# Patient Record
Sex: MALE | Race: BLACK OR AFRICAN AMERICAN | HISPANIC OR LATINO | Employment: FULL TIME | URBAN - METROPOLITAN AREA
[De-identification: names, ages, dates, MRNs, and addresses within clinical notes are randomized per-mention and may not be internally consistent; named-entity substitution may affect disease eponyms.]

---

## 2019-04-03 ENCOUNTER — TELEPHONE (OUTPATIENT)
Dept: OTOLARYNGOLOGY | Facility: CLINIC | Age: 44
End: 2019-04-03

## 2019-04-03 NOTE — TELEPHONE ENCOUNTER
Spoke with patients wife she is wanting to schedule a sooner appointment for ear pain. Offered soonest appt on 4/9 at 1:00 with audiologist and 1:40 with Dr Overton. Holley agreed to schedule for this date and time

## 2019-04-03 NOTE — TELEPHONE ENCOUNTER
----- Message from Mar Edwards sent at 4/3/2019  1:35 PM CDT -----  Contact: alejandra Babb [phone]  Type:  Sooner Apoointment Request    Caller is requesting a sooner appointment.  Caller declined first available appointment listed below.  Caller will not accept being placed on the waitlist and is requesting a message be sent to doctor.  Name of Caller: alejandra Babb  When is the first available appointment? 4/29/19  Symptoms: right ear pain and pressure  Would the patient rather a call back or a response via [facility]? Call back  Best Call Back Number: [phone] (work)  Additional Information: patient will be self-pay. Requesting an appt for 4/12/19

## 2019-04-09 ENCOUNTER — CLINICAL SUPPORT (OUTPATIENT)
Dept: OTOLARYNGOLOGY | Facility: CLINIC | Age: 44
End: 2019-04-09

## 2019-04-09 ENCOUNTER — OFFICE VISIT (OUTPATIENT)
Dept: OTOLARYNGOLOGY | Facility: CLINIC | Age: 44
End: 2019-04-09

## 2019-04-09 VITALS
SYSTOLIC BLOOD PRESSURE: 130 MMHG | TEMPERATURE: 97 F | HEART RATE: 61 BPM | DIASTOLIC BLOOD PRESSURE: 82 MMHG | WEIGHT: 189.94 LBS

## 2019-04-09 DIAGNOSIS — H60.311 CHRONIC DIFFUSE OTITIS EXTERNA OF RIGHT EAR: Primary | ICD-10-CM

## 2019-04-09 DIAGNOSIS — H92.11 OTORRHEA OF RIGHT EAR: ICD-10-CM

## 2019-04-09 DIAGNOSIS — J30.89 NON-SEASONAL ALLERGIC RHINITIS, UNSPECIFIED TRIGGER: ICD-10-CM

## 2019-04-09 PROCEDURE — 92552 PURE TONE AUDIOMETRY AIR: CPT | Mod: PBBFAC,PN | Performed by: AUDIOLOGIST-HEARING AID FITTER

## 2019-04-09 PROCEDURE — 92567 TYMPANOMETRY: CPT | Mod: PBBFAC,PN | Performed by: AUDIOLOGIST-HEARING AID FITTER

## 2019-04-09 PROCEDURE — 99999 PR PBB SHADOW E&M-EST. PATIENT-LVL III: CPT | Mod: PBBFAC,,, | Performed by: OTOLARYNGOLOGY

## 2019-04-09 PROCEDURE — 99211 OFF/OP EST MAY X REQ PHY/QHP: CPT | Mod: PBBFAC,27,PN | Performed by: AUDIOLOGIST-HEARING AID FITTER

## 2019-04-09 PROCEDURE — 99203 OFFICE O/P NEW LOW 30 MIN: CPT | Mod: S$PBB,,, | Performed by: OTOLARYNGOLOGY

## 2019-04-09 PROCEDURE — 92556 SPEECH AUDIOMETRY COMPLETE: CPT | Mod: PBBFAC,PN | Performed by: AUDIOLOGIST-HEARING AID FITTER

## 2019-04-09 PROCEDURE — 99999 PR PBB SHADOW E&M-EST. PATIENT-LVL III: ICD-10-PCS | Mod: PBBFAC,,, | Performed by: OTOLARYNGOLOGY

## 2019-04-09 PROCEDURE — 99203 PR OFFICE/OUTPT VISIT, NEW, LEVL III, 30-44 MIN: ICD-10-PCS | Mod: S$PBB,,, | Performed by: OTOLARYNGOLOGY

## 2019-04-09 PROCEDURE — 99999 PR PBB SHADOW E&M-EST. PATIENT-LVL I: CPT | Mod: PBBFAC,,, | Performed by: AUDIOLOGIST-HEARING AID FITTER

## 2019-04-09 PROCEDURE — 99213 OFFICE O/P EST LOW 20 MIN: CPT | Mod: PBBFAC,PN | Performed by: OTOLARYNGOLOGY

## 2019-04-09 PROCEDURE — 99999 PR PBB SHADOW E&M-EST. PATIENT-LVL I: ICD-10-PCS | Mod: PBBFAC,,, | Performed by: AUDIOLOGIST-HEARING AID FITTER

## 2019-04-09 RX ORDER — FLUTICASONE PROPIONATE 50 MCG
2 SPRAY, SUSPENSION (ML) NASAL DAILY
Qty: 15.8 ML | Refills: 12 | Status: SHIPPED | OUTPATIENT
Start: 2019-04-09 | End: 2023-09-29

## 2019-04-09 RX ORDER — CIPROFLOXACIN HYDROCHLORIDE 3 MG/ML
SOLUTION/ DROPS OPHTHALMIC
Qty: 10 ML | Refills: 0 | Status: SHIPPED | OUTPATIENT
Start: 2019-04-09 | End: 2023-09-29

## 2019-04-09 RX ORDER — PREDNISOLONE ACETATE 10 MG/ML
SUSPENSION/ DROPS OPHTHALMIC
Qty: 10 ML | Refills: 0 | Status: SHIPPED | OUTPATIENT
Start: 2019-04-09 | End: 2023-09-29

## 2019-04-09 NOTE — PROGRESS NOTES
Chief Complaint   Patient presents with    Other     after using ear plugs noticed draining, also itching   .     HPI:    Jone is a 43 y.o. male who presents for evaluation of right ear pain. The pain has been present for 4 month(s). He notes that the he has had a discharge from the right ear after using ear plugs.  The pain is described as mild.  It is associated with wearing ear plugs. The ear is not tender to touch. The post auricular area is not inflammed.  He has been treated treated with acetic acid drops and cortisoporin ear drops without relief. The drainage is not  unchanged with this course of action.      No past medical history on file.  Social History     Socioeconomic History    Marital status:      Spouse name: Not on file    Number of children: Not on file    Years of education: Not on file    Highest education level: Not on file   Occupational History    Not on file   Social Needs    Financial resource strain: Not on file    Food insecurity:     Worry: Not on file     Inability: Not on file    Transportation needs:     Medical: Not on file     Non-medical: Not on file   Tobacco Use    Smoking status: Not on file   Substance and Sexual Activity    Alcohol use: Not on file    Drug use: Not on file    Sexual activity: Not on file   Lifestyle    Physical activity:     Days per week: Not on file     Minutes per session: Not on file    Stress: Not on file   Relationships    Social connections:     Talks on phone: Not on file     Gets together: Not on file     Attends Sabianism service: Not on file     Active member of club or organization: Not on file     Attends meetings of clubs or organizations: Not on file     Relationship status: Not on file   Other Topics Concern    Not on file   Social History Narrative    Not on file     No past surgical history on file.  No family history on file.        Review of Systems  General: negative for chills, fever or weight  loss  Psychological: negative for mood changes or depression  Ophthalmic: negative for blurry vision, photophobia or eye pain  ENT: see HPI  Respiratory: no cough, shortness of breath, or wheezing  Cardiovascular: no chest pain or dyspnea on exertion  Gastrointestinal: no abdominal pain, change in bowel habits, or black/ bloody stools  Musculoskeletal: negative for gait disturbance or muscular weakness  Neurological: no syncope or seizures; no ataxia  Dermatological: negative for puritis,  rash and jaundice  Hematologic/lymphatic: no easy bruising, no new lumps or bumps      Physical Exam:    Vitals:    04/09/19 1348   BP: 130/82   Pulse: 61   Temp: 97.2 °F (36.2 °C)       Constitutional: Well appearing / communicating without difficutly.  NAD.  Eyes: EOM I Bilaterally  Head/Face: Normocephalic.  Negative paranasal sinus pressure/tenderness.  Salivary glands WNL.  House Brackmann I Bilaterally.    Right Ear: Auricle normal appearance. External Auditory Canal within normal limits no lesions or masses,TM w/o masses/lesions/perforations. TM mobility noted.   Left Ear: Auricle normal appearance. External Auditory Canal within normal limits no lesions or masses,TM w/o masses/lesions/perforations. TM mobility noted.  Nose: No gross nasal septal deviation. Inferior Turbinates 3+ bilaterally. No septal perforation. No masses/lesions. External nasal skin appears normal without masses/lesions.  Oral Cavity: Gingiva/lips within normal limits.  Dentition/gingiva healthy appearing. Mucus membranes moist. Floor of mouth soft, no masses palpated. Oral Tongue mobile. Hard Palate appears normal.    Oropharynx: Base of tongue appears normal. No masses/lesions noted. Tonsillar fossa/pharyngeal wall without lesions. Posterior oropharynx WNL.  Soft palate without masses. Midline uvula.   Neck/Lymphatic: No LAD I-VI bilaterally.  No thyromegaly.  No masses noted on exam.    Mirror laryngoscopy/nasopharyngoscopy: Active gag reflex.   Unable to perform.    Neuro/Psychiatric: AOx3.  Normal mood and affect.   Cardiovascular: Normal carotid pulses bilaterally, no increasing jugular venous distention noted at cervical region bilaterally.    Respiratory: Normal respiratory effort, no stridor, no retractions noted.                  Assessment:    ICD-10-CM ICD-9-CM    1. Chronic diffuse otitis externa of right ear H60.311 380.23    2. Otorrhea of right ear H92.11 388.60    3. Non-seasonal allergic rhinitis, unspecified trigger J30.89 477.8      The primary encounter diagnosis was Chronic diffuse otitis externa of right ear. Diagnoses of Otorrhea of right ear and Non-seasonal allergic rhinitis, unspecified trigger were also pertinent to this visit.      Plan:  No orders of the defined types were placed in this encounter.      The patient has otitis externa of the the right ear today.  I would recommend that we start the patient on cipro and pred forte ear drops.  An oral antibiotic  will not be prescribed because the patient does not have risk factors requiring systemic therapy. The patient has been instructed to keep the affected ear dry. Return to clinic in two weeks, sooner if needed.     Carlotta Cannon MD

## 2019-04-10 NOTE — PROGRESS NOTES
Keiko Norman, CCC-A  [facility] [address]  [address]  [place] [address]      Patient: Jone Guerrero   MRN: [id number]   : 1975  RAO: 04/10/2019    AUDIOLOGICAL EVALUATION    CASE HISTORY:    Jone Guerrero, 43 y.o., was seen on the above date for an audiological evaluation. Patient reported that he has been treated twice with abx by PCPs with no improvement of his symptoms. No further history significant to hearing loss was reported.    TEST RESULTS:    Otoscopy was unremarkable for both ears.   Imittance testing of both ears revealed normal middle ear compliance (Type A).  Speech reception thresholds were obtained at 10 dB HL and 5 dB HL, in the right and left ears, respectively, which was consistent with pure tone results.   Word recognitions scores of 100% were obtained in both ears using a presentation level of 50 dB HL in the right ear and 45 dB HL in the left ear.    IMPRESSION:   Audiological testing indicated that Jone Guerrero has normal hearing in both ears.    RECOMMENDATIONS:   There are no audiological recommendations at this time.    If you should have any questions or concerns regarding the above information, please do not hesitate to contact me at [phone].      _______________________________  Jarad Norman, CCC-A  Clinical Audiologist    enclosure: audiogram

## 2019-04-23 ENCOUNTER — OFFICE VISIT (OUTPATIENT)
Dept: OTOLARYNGOLOGY | Facility: CLINIC | Age: 44
End: 2019-04-23

## 2019-04-23 ENCOUNTER — TELEPHONE (OUTPATIENT)
Dept: OTOLARYNGOLOGY | Facility: CLINIC | Age: 44
End: 2019-04-23

## 2019-04-23 VITALS
HEART RATE: 63 BPM | TEMPERATURE: 97 F | DIASTOLIC BLOOD PRESSURE: 82 MMHG | SYSTOLIC BLOOD PRESSURE: 128 MMHG | HEIGHT: 70 IN | WEIGHT: 188.06 LBS | BODY MASS INDEX: 26.92 KG/M2

## 2019-04-23 DIAGNOSIS — H60.311 CHRONIC DIFFUSE OTITIS EXTERNA OF RIGHT EAR: Primary | ICD-10-CM

## 2019-04-23 DIAGNOSIS — J30.89 NON-SEASONAL ALLERGIC RHINITIS, UNSPECIFIED TRIGGER: ICD-10-CM

## 2019-04-23 PROCEDURE — 99213 OFFICE O/P EST LOW 20 MIN: CPT | Mod: S$PBB,,, | Performed by: OTOLARYNGOLOGY

## 2019-04-23 PROCEDURE — 99999 PR PBB SHADOW E&M-EST. PATIENT-LVL III: ICD-10-PCS | Mod: PBBFAC,,, | Performed by: OTOLARYNGOLOGY

## 2019-04-23 PROCEDURE — 99213 PR OFFICE/OUTPT VISIT, EST, LEVL III, 20-29 MIN: ICD-10-PCS | Mod: S$PBB,,, | Performed by: OTOLARYNGOLOGY

## 2019-04-23 PROCEDURE — 99213 OFFICE O/P EST LOW 20 MIN: CPT | Mod: PBBFAC,PN | Performed by: OTOLARYNGOLOGY

## 2019-04-23 PROCEDURE — 99999 PR PBB SHADOW E&M-EST. PATIENT-LVL III: CPT | Mod: PBBFAC,,, | Performed by: OTOLARYNGOLOGY

## 2019-04-23 RX ORDER — FLUOCINOLONE ACETONIDE 0.11 MG/ML
3 OIL AURICULAR (OTIC) 2 TIMES DAILY
Qty: 1 BOTTLE | Refills: 3 | Status: SHIPPED | OUTPATIENT
Start: 2019-04-23 | End: 2019-04-23

## 2019-04-23 NOTE — TELEPHONE ENCOUNTER
Spoke with Arabella at [facility] pharmacy she was notified as per Dr Overton to cancel the prescription for the dermotic oil ear drops. Arabella verbalized understanding and stated I will cancel this prescription

## 2019-04-23 NOTE — PROGRESS NOTES
Chief Complaint   Patient presents with    Follow-up     patient reports no pain or drainage in right ear, c/o of itching    .     HPI:    Jone is a 43 y.o. male who presents for evaluation of right ear pain. The pain has been present for 4 month(s). He notes that the he has had a discharge from the right ear after using ear plugs.  The pain is described as mild.  It is associated with wearing ear plugs. The ear is not tender to touch. The post auricular area is not inflammed.  He has been treated treated with acetic acid drops and cortisoporin ear drops without relief. The drainage is not  unchanged with this course of action.    Interval HPI 4/23/2019:   Mr. Guerrero follows up today for chronic otitis externa of the right ear.  He reports that hs is no longer having discharge or otalgia from the right ear. He does note continued pruritis of the right ear. He has taken used Cipro Otic and Pred forte drops as prescribed. He feels that this has provided relief.       History reviewed. No pertinent past medical history.  Social History     Socioeconomic History    Marital status:      Spouse name: Not on file    Number of children: Not on file    Years of education: Not on file    Highest education level: Not on file   Occupational History    Not on file   Social Needs    Financial resource strain: Not on file    Food insecurity:     Worry: Not on file     Inability: Not on file    Transportation needs:     Medical: Not on file     Non-medical: Not on file   Tobacco Use    Smoking status: Never Smoker    Smokeless tobacco: Never Used   Substance and Sexual Activity    Alcohol use: Not on file    Drug use: Not on file    Sexual activity: Not on file   Lifestyle    Physical activity:     Days per week: Not on file     Minutes per session: Not on file    Stress: Not on file   Relationships    Social connections:     Talks on phone: Not on file     Gets together: Not on file     Attends  Islam service: Not on file     Active member of club or organization: Not on file     Attends meetings of clubs or organizations: Not on file     Relationship status: Not on file   Other Topics Concern    Not on file   Social History Narrative    Not on file     History reviewed. No pertinent surgical history.  History reviewed. No pertinent family history.        Review of Systems  General: negative for chills, fever or weight loss  Psychological: negative for mood changes or depression  Ophthalmic: negative for blurry vision, photophobia or eye pain  ENT: see HPI  Respiratory: no cough, shortness of breath, or wheezing  Cardiovascular: no chest pain or dyspnea on exertion  Gastrointestinal: no abdominal pain, change in bowel habits, or black/ bloody stools  Musculoskeletal: negative for gait disturbance or muscular weakness  Neurological: no syncope or seizures; no ataxia  Dermatological: negative for puritis,  rash and jaundice  Hematologic/lymphatic: no easy bruising, no new lumps or bumps      Physical Exam:    Vitals:    04/23/19 0903   BP: 128/82   Pulse: 63   Temp: 96.6 °F (35.9 °C)       Constitutional: Well appearing / communicating without difficutly.  NAD.  Eyes: EOM I Bilaterally  Head/Face: Normocephalic.  Negative paranasal sinus pressure/tenderness.  Salivary glands WNL.  House Brackmann I Bilaterally.    Right Ear: Auricle normal appearance. External Auditory Canal within normal limits no lesions or masses,TM w/o masses/lesions/perforations. TM mobility noted.   Left Ear: Auricle normal appearance. External Auditory Canal within normal limits no lesions or masses,TM w/o masses/lesions/perforations. TM mobility noted.  Nose: No gross nasal septal deviation. Inferior Turbinates 3+ bilaterally. No septal perforation. No masses/lesions. External nasal skin appears normal without masses/lesions.  Oral Cavity: Gingiva/lips within normal limits.  Dentition/gingiva healthy appearing. Mucus membranes  moist. Floor of mouth soft, no masses palpated. Oral Tongue mobile. Hard Palate appears normal.    Oropharynx: Base of tongue appears normal. No masses/lesions noted. Tonsillar fossa/pharyngeal wall without lesions. Posterior oropharynx WNL.  Soft palate without masses. Midline uvula.   Neck/Lymphatic: No LAD I-VI bilaterally.  No thyromegaly.  No masses noted on exam.    Mirror laryngoscopy/nasopharyngoscopy: Active gag reflex.  Unable to perform.    Neuro/Psychiatric: AOx3.  Normal mood and affect.   Cardiovascular: Normal carotid pulses bilaterally, no increasing jugular venous distention noted at cervical region bilaterally.    Respiratory: Normal respiratory effort, no stridor, no retractions noted.        Assessment:    ICD-10-CM ICD-9-CM    1. Chronic diffuse otitis externa of right ear H60.311 380.23    2. Non-seasonal allergic rhinitis, unspecified trigger J30.89 477.8      The primary encounter diagnosis was Chronic diffuse otitis externa of right ear. A diagnosis of Non-seasonal allergic rhinitis, unspecified trigger was also pertinent to this visit.      Plan:  No orders of the defined types were placed in this encounter.    Infection overall improved.  The patient has been instructed to keep the affected ear dry. Return to clinic in two weeks, sooner if needed.     Continue Flonase.     Carlotta Cannon MD

## 2021-03-30 ENCOUNTER — IMMUNIZATION (OUTPATIENT)
Dept: PRIMARY CARE CLINIC | Facility: CLINIC | Age: 46
End: 2021-03-30

## 2021-03-30 DIAGNOSIS — Z23 NEED FOR VACCINATION: Primary | ICD-10-CM

## 2021-03-30 PROCEDURE — 91301 PR SARS-COV-2 COVID-19 VACCINE, NO PRSV, 100MCG/0.5ML, IM: CPT | Mod: S$GLB,,, | Performed by: INTERNAL MEDICINE

## 2021-03-30 PROCEDURE — 0011A PR IMMUNIZ ADMIN, SARS-COV-2 COVID-19 VACC, 100MCG/0.5ML, 1ST DOSE: CPT | Mod: CV19,S$GLB,, | Performed by: INTERNAL MEDICINE

## 2021-03-30 PROCEDURE — 0011A PR IMMUNIZ ADMIN, SARS-COV-2 COVID-19 VACC, 100MCG/0.5ML, 1ST DOSE: ICD-10-PCS | Mod: CV19,S$GLB,, | Performed by: INTERNAL MEDICINE

## 2021-03-30 PROCEDURE — 91301 PR SARS-COV-2 COVID-19 VACCINE, NO PRSV, 100MCG/0.5ML, IM: ICD-10-PCS | Mod: S$GLB,,, | Performed by: INTERNAL MEDICINE

## 2021-03-30 RX ADMIN — Medication 0.5 ML: at 09:03

## 2021-04-06 ENCOUNTER — OFFICE VISIT (OUTPATIENT)
Dept: URGENT CARE | Facility: CLINIC | Age: 46
End: 2021-04-06

## 2021-04-06 VITALS
WEIGHT: 188 LBS | RESPIRATION RATE: 16 BRPM | HEIGHT: 70 IN | OXYGEN SATURATION: 96 % | HEART RATE: 73 BPM | SYSTOLIC BLOOD PRESSURE: 136 MMHG | BODY MASS INDEX: 26.92 KG/M2 | DIASTOLIC BLOOD PRESSURE: 94 MMHG | TEMPERATURE: 99 F

## 2021-04-06 DIAGNOSIS — Z11.52 ENCOUNTER FOR SCREENING LABORATORY TESTING FOR COVID-19 VIRUS: Primary | ICD-10-CM

## 2021-04-06 LAB
CTP QC/QA: YES
SARS-COV-2 RDRP RESP QL NAA+PROBE: NEGATIVE

## 2021-04-06 PROCEDURE — 99214 OFFICE O/P EST MOD 30 MIN: CPT | Mod: TIER,S$GLB,, | Performed by: NURSE PRACTITIONER

## 2021-04-06 PROCEDURE — U0002 COVID-19 LAB TEST NON-CDC: HCPCS | Mod: QW,TIER,S$GLB, | Performed by: NURSE PRACTITIONER

## 2021-04-06 PROCEDURE — U0002: ICD-10-PCS | Mod: QW,TIER,S$GLB, | Performed by: NURSE PRACTITIONER

## 2021-04-06 PROCEDURE — 99214 PR OFFICE/OUTPT VISIT, EST, LEVL IV, 30-39 MIN: ICD-10-PCS | Mod: TIER,S$GLB,, | Performed by: NURSE PRACTITIONER

## 2021-05-10 ENCOUNTER — IMMUNIZATION (OUTPATIENT)
Dept: PHARMACY | Facility: CLINIC | Age: 46
End: 2021-05-10

## 2021-05-10 DIAGNOSIS — Z23 NEED FOR VACCINATION: Primary | ICD-10-CM

## 2021-09-27 ENCOUNTER — TELEPHONE (OUTPATIENT)
Dept: FAMILY MEDICINE | Facility: CLINIC | Age: 46
End: 2021-09-27

## 2021-10-04 ENCOUNTER — LAB VISIT (OUTPATIENT)
Dept: LAB | Facility: HOSPITAL | Age: 46
End: 2021-10-04
Attending: INTERNAL MEDICINE
Payer: MEDICAID

## 2021-10-04 ENCOUNTER — OFFICE VISIT (OUTPATIENT)
Dept: INTERNAL MEDICINE | Facility: CLINIC | Age: 46
End: 2021-10-04
Payer: MEDICAID

## 2021-10-04 VITALS
HEART RATE: 68 BPM | DIASTOLIC BLOOD PRESSURE: 96 MMHG | BODY MASS INDEX: 28.32 KG/M2 | WEIGHT: 197.38 LBS | SYSTOLIC BLOOD PRESSURE: 138 MMHG

## 2021-10-04 DIAGNOSIS — I73.00 RAYNAUD'S PHENOMENON WITHOUT GANGRENE: ICD-10-CM

## 2021-10-04 DIAGNOSIS — Z76.89 ENCOUNTER TO ESTABLISH CARE: ICD-10-CM

## 2021-10-04 DIAGNOSIS — Z12.5 PROSTATE CANCER SCREENING: ICD-10-CM

## 2021-10-04 DIAGNOSIS — M13.0 POLYARTHRITIS: Primary | ICD-10-CM

## 2021-10-04 DIAGNOSIS — R76.8 ANA POSITIVE: ICD-10-CM

## 2021-10-04 DIAGNOSIS — M13.0 POLYARTHRITIS: ICD-10-CM

## 2021-10-04 DIAGNOSIS — Z80.42 FAMILY HISTORY OF PROSTATE CANCER IN FATHER: ICD-10-CM

## 2021-10-04 LAB
25(OH)D3+25(OH)D2 SERPL-MCNC: 33 NG/ML (ref 30–96)
ALBUMIN SERPL BCP-MCNC: 3.5 G/DL (ref 3.5–5.2)
ALP SERPL-CCNC: 103 U/L (ref 55–135)
ALT SERPL W/O P-5'-P-CCNC: 12 U/L (ref 10–44)
ANION GAP SERPL CALC-SCNC: 8 MMOL/L (ref 8–16)
AST SERPL-CCNC: 19 U/L (ref 10–40)
BASOPHILS # BLD AUTO: 0.03 K/UL (ref 0–0.2)
BASOPHILS NFR BLD: 0.8 % (ref 0–1.9)
BILIRUB SERPL-MCNC: 0.6 MG/DL (ref 0.1–1)
BUN SERPL-MCNC: 13 MG/DL (ref 6–20)
CALCIUM SERPL-MCNC: 9.2 MG/DL (ref 8.7–10.5)
CCP AB SER IA-ACNC: 0.7 U/ML
CHLORIDE SERPL-SCNC: 106 MMOL/L (ref 95–110)
CO2 SERPL-SCNC: 23 MMOL/L (ref 23–29)
COMPLEXED PSA SERPL-MCNC: 1.4 NG/ML (ref 0–4)
CREAT SERPL-MCNC: 0.8 MG/DL (ref 0.5–1.4)
CRP SERPL-MCNC: 18.3 MG/L (ref 0–8.2)
DIFFERENTIAL METHOD: ABNORMAL
EOSINOPHIL # BLD AUTO: 0.1 K/UL (ref 0–0.5)
EOSINOPHIL NFR BLD: 2.6 % (ref 0–8)
ERYTHROCYTE [DISTWIDTH] IN BLOOD BY AUTOMATED COUNT: 14 % (ref 11.5–14.5)
ERYTHROCYTE [SEDIMENTATION RATE] IN BLOOD BY WESTERGREN METHOD: 116 MM/HR (ref 0–23)
EST. GFR  (AFRICAN AMERICAN): >60 ML/MIN/1.73 M^2
EST. GFR  (NON AFRICAN AMERICAN): >60 ML/MIN/1.73 M^2
GLUCOSE SERPL-MCNC: 82 MG/DL (ref 70–110)
HCT VFR BLD AUTO: 37.6 % (ref 40–54)
HGB BLD-MCNC: 12 G/DL (ref 14–18)
IMM GRANULOCYTES # BLD AUTO: 0.02 K/UL (ref 0–0.04)
IMM GRANULOCYTES NFR BLD AUTO: 0.5 % (ref 0–0.5)
LYMPHOCYTES # BLD AUTO: 2 K/UL (ref 1–4.8)
LYMPHOCYTES NFR BLD: 50.6 % (ref 18–48)
MCH RBC QN AUTO: 27.5 PG (ref 27–31)
MCHC RBC AUTO-ENTMCNC: 31.9 G/DL (ref 32–36)
MCV RBC AUTO: 86 FL (ref 82–98)
MONOCYTES # BLD AUTO: 0.4 K/UL (ref 0.3–1)
MONOCYTES NFR BLD: 9 % (ref 4–15)
NEUTROPHILS # BLD AUTO: 1.4 K/UL (ref 1.8–7.7)
NEUTROPHILS NFR BLD: 36.5 % (ref 38–73)
NRBC BLD-RTO: 0 /100 WBC
PLATELET # BLD AUTO: 155 K/UL (ref 150–450)
PMV BLD AUTO: 11.7 FL (ref 9.2–12.9)
POTASSIUM SERPL-SCNC: 4.1 MMOL/L (ref 3.5–5.1)
PROT SERPL-MCNC: 8.3 G/DL (ref 6–8.4)
RBC # BLD AUTO: 4.36 M/UL (ref 4.6–6.2)
RHEUMATOID FACT SERPL-ACNC: <13 IU/ML (ref 0–15)
SODIUM SERPL-SCNC: 137 MMOL/L (ref 136–145)
WBC # BLD AUTO: 3.91 K/UL (ref 3.9–12.7)

## 2021-10-04 PROCEDURE — 99999 PR PBB SHADOW E&M-EST. PATIENT-LVL II: ICD-10-PCS | Mod: PBBFAC,,, | Performed by: INTERNAL MEDICINE

## 2021-10-04 PROCEDURE — 86039 ANTINUCLEAR ANTIBODIES (ANA): CPT | Performed by: INTERNAL MEDICINE

## 2021-10-04 PROCEDURE — 80053 COMPREHEN METABOLIC PANEL: CPT | Performed by: INTERNAL MEDICINE

## 2021-10-04 PROCEDURE — 86235 NUCLEAR ANTIGEN ANTIBODY: CPT | Mod: 59 | Performed by: INTERNAL MEDICINE

## 2021-10-04 PROCEDURE — 36415 COLL VENOUS BLD VENIPUNCTURE: CPT | Performed by: INTERNAL MEDICINE

## 2021-10-04 PROCEDURE — 99499 NO LOS: ICD-10-PCS | Mod: S$PBB,,, | Performed by: INTERNAL MEDICINE

## 2021-10-04 PROCEDURE — 86431 RHEUMATOID FACTOR QUANT: CPT | Performed by: INTERNAL MEDICINE

## 2021-10-04 PROCEDURE — 84153 ASSAY OF PSA TOTAL: CPT | Performed by: INTERNAL MEDICINE

## 2021-10-04 PROCEDURE — 86200 CCP ANTIBODY: CPT | Performed by: INTERNAL MEDICINE

## 2021-10-04 PROCEDURE — 86225 DNA ANTIBODY NATIVE: CPT | Mod: 59 | Performed by: INTERNAL MEDICINE

## 2021-10-04 PROCEDURE — 85025 COMPLETE CBC W/AUTO DIFF WBC: CPT | Performed by: INTERNAL MEDICINE

## 2021-10-04 PROCEDURE — 86038 ANTINUCLEAR ANTIBODIES: CPT | Performed by: INTERNAL MEDICINE

## 2021-10-04 PROCEDURE — 85652 RBC SED RATE AUTOMATED: CPT | Performed by: INTERNAL MEDICINE

## 2021-10-04 PROCEDURE — 99999 PR PBB SHADOW E&M-EST. PATIENT-LVL II: CPT | Mod: PBBFAC,,, | Performed by: INTERNAL MEDICINE

## 2021-10-04 PROCEDURE — 86140 C-REACTIVE PROTEIN: CPT | Performed by: INTERNAL MEDICINE

## 2021-10-04 PROCEDURE — 99212 OFFICE O/P EST SF 10 MIN: CPT | Mod: PBBFAC | Performed by: INTERNAL MEDICINE

## 2021-10-04 PROCEDURE — 82306 VITAMIN D 25 HYDROXY: CPT | Performed by: INTERNAL MEDICINE

## 2021-10-04 PROCEDURE — 99499 UNLISTED E&M SERVICE: CPT | Mod: S$PBB,,, | Performed by: INTERNAL MEDICINE

## 2021-10-05 LAB
ANA PATTERN 1: NORMAL
ANA SER QL IF: POSITIVE
ANA TITR SER IF: NORMAL {TITER}

## 2021-10-07 LAB
ANTI SM ANTIBODY: 3.99 RATIO (ref 0–0.99)
ANTI SM/RNP ANTIBODY: 6.56 RATIO (ref 0–0.99)
ANTI-SM INTERPRETATION: POSITIVE
ANTI-SM/RNP INTERPRETATION: POSITIVE
ANTI-SSA ANTIBODY: 5.53 RATIO (ref 0–0.99)
ANTI-SSA INTERPRETATION: POSITIVE
ANTI-SSB ANTIBODY: 0.6 RATIO (ref 0–0.99)
ANTI-SSB INTERPRETATION: NEGATIVE
DNA TITER: NORMAL
DSDNA AB SER-ACNC: POSITIVE [IU]/ML

## 2021-10-11 ENCOUNTER — LAB VISIT (OUTPATIENT)
Dept: LAB | Facility: HOSPITAL | Age: 46
End: 2021-10-11
Attending: INTERNAL MEDICINE
Payer: MEDICAID

## 2021-10-11 ENCOUNTER — OFFICE VISIT (OUTPATIENT)
Dept: INTERNAL MEDICINE | Facility: CLINIC | Age: 46
End: 2021-10-11
Payer: MEDICAID

## 2021-10-11 DIAGNOSIS — M32.9 LUPUS: Primary | ICD-10-CM

## 2021-10-11 DIAGNOSIS — M32.9 LUPUS: ICD-10-CM

## 2021-10-11 DIAGNOSIS — Z71.89 ENCOUNTER FOR MEDICATION REVIEW AND COUNSELING: ICD-10-CM

## 2021-10-11 LAB
BILIRUB UR QL STRIP: NEGATIVE
CLARITY UR REFRACT.AUTO: CLEAR
COLOR UR AUTO: YELLOW
CREAT UR-MCNC: 216 MG/DL (ref 23–375)
GLUCOSE UR QL STRIP: NEGATIVE
HGB UR QL STRIP: ABNORMAL
KETONES UR QL STRIP: NEGATIVE
LEUKOCYTE ESTERASE UR QL STRIP: NEGATIVE
MICROSCOPIC COMMENT: NORMAL
NITRITE UR QL STRIP: NEGATIVE
PH UR STRIP: 6 [PH] (ref 5–8)
PROT UR QL STRIP: NEGATIVE
PROT UR-MCNC: 14 MG/DL (ref 0–15)
PROT/CREAT UR: 0.06 MG/G{CREAT} (ref 0–0.2)
RBC #/AREA URNS AUTO: 0 /HPF (ref 0–4)
SP GR UR STRIP: 1.03 (ref 1–1.03)
URN SPEC COLLECT METH UR: ABNORMAL

## 2021-10-11 PROCEDURE — 99499 NO LOS: ICD-10-PCS | Mod: S$PBB,,, | Performed by: INTERNAL MEDICINE

## 2021-10-11 PROCEDURE — 99499 UNLISTED E&M SERVICE: CPT | Mod: S$PBB,,, | Performed by: INTERNAL MEDICINE

## 2021-10-11 PROCEDURE — 81001 URINALYSIS AUTO W/SCOPE: CPT | Performed by: INTERNAL MEDICINE

## 2021-10-11 PROCEDURE — 99999 PR PBB SHADOW E&M-EST. PATIENT-LVL I: CPT | Mod: PBBFAC,,, | Performed by: INTERNAL MEDICINE

## 2021-10-11 PROCEDURE — 99999 PR PBB SHADOW E&M-EST. PATIENT-LVL I: ICD-10-PCS | Mod: PBBFAC,,, | Performed by: INTERNAL MEDICINE

## 2021-10-11 PROCEDURE — 82570 ASSAY OF URINE CREATININE: CPT | Performed by: INTERNAL MEDICINE

## 2021-10-11 PROCEDURE — 99211 OFF/OP EST MAY X REQ PHY/QHP: CPT | Mod: PBBFAC | Performed by: INTERNAL MEDICINE

## 2021-10-19 ENCOUNTER — OFFICE VISIT (OUTPATIENT)
Dept: RHEUMATOLOGY | Facility: CLINIC | Age: 46
End: 2021-10-19

## 2021-10-19 VITALS
SYSTOLIC BLOOD PRESSURE: 133 MMHG | WEIGHT: 195.56 LBS | BODY MASS INDEX: 28 KG/M2 | HEART RATE: 68 BPM | DIASTOLIC BLOOD PRESSURE: 83 MMHG | HEIGHT: 70 IN

## 2021-10-19 DIAGNOSIS — M32.19 OTHER SYSTEMIC LUPUS ERYTHEMATOSUS WITH OTHER ORGAN INVOLVEMENT: Primary | ICD-10-CM

## 2021-10-19 PROBLEM — M32.8 OTHER FORMS OF SYSTEMIC LUPUS ERYTHEMATOSUS: Chronic | Status: ACTIVE | Noted: 2021-10-11

## 2021-10-19 PROCEDURE — 99203 PR OFFICE/OUTPT VISIT, NEW, LEVL III, 30-44 MIN: ICD-10-PCS | Mod: S$PBB,,, | Performed by: INTERNAL MEDICINE

## 2021-10-19 PROCEDURE — 99213 OFFICE O/P EST LOW 20 MIN: CPT | Mod: PBBFAC | Performed by: INTERNAL MEDICINE

## 2021-10-19 PROCEDURE — 99999 PR PBB SHADOW E&M-EST. PATIENT-LVL III: CPT | Mod: PBBFAC,,, | Performed by: INTERNAL MEDICINE

## 2021-10-19 PROCEDURE — 99999 PR PBB SHADOW E&M-EST. PATIENT-LVL III: ICD-10-PCS | Mod: PBBFAC,,, | Performed by: INTERNAL MEDICINE

## 2021-10-19 PROCEDURE — 99203 OFFICE O/P NEW LOW 30 MIN: CPT | Mod: S$PBB,,, | Performed by: INTERNAL MEDICINE

## 2021-10-19 RX ORDER — HYDROXYCHLOROQUINE SULFATE 200 MG/1
400 TABLET, FILM COATED ORAL DAILY
Qty: 60 TABLET | Refills: 2 | Status: SHIPPED | OUTPATIENT
Start: 2021-10-19 | End: 2021-12-27 | Stop reason: SDUPTHER

## 2021-10-19 ASSESSMENT — ROUTINE ASSESSMENT OF PATIENT INDEX DATA (RAPID3)
MDHAQ FUNCTION SCORE: 0
PSYCHOLOGICAL DISTRESS SCORE: 1.1
FATIGUE SCORE: 3
AM STIFFNESS SCORE: 1, YES
TOTAL RAPID3 SCORE: 4.67
PATIENT GLOBAL ASSESSMENT SCORE: 6.5
PAIN SCORE: 7.5

## 2021-10-19 ASSESSMENT — SYSTEMIC LUPUS ERYTHEMATOSUS DISEASE ACTIVITY INDEX (SLEDAI): TOTAL_SCORE: 5

## 2021-10-22 RX ORDER — METHYLPREDNISOLONE 4 MG/1
TABLET ORAL
Qty: 21 EACH | Refills: 0 | Status: SHIPPED | OUTPATIENT
Start: 2021-10-22 | End: 2021-11-12

## 2021-12-27 DIAGNOSIS — M32.19 OTHER SYSTEMIC LUPUS ERYTHEMATOSUS WITH OTHER ORGAN INVOLVEMENT: ICD-10-CM

## 2021-12-27 RX ORDER — HYDROXYCHLOROQUINE SULFATE 200 MG/1
400 TABLET, FILM COATED ORAL DAILY
Qty: 60 TABLET | Refills: 3 | Status: SHIPPED | OUTPATIENT
Start: 2021-12-27 | End: 2022-04-01 | Stop reason: SDUPTHER

## 2022-01-12 ENCOUNTER — LAB VISIT (OUTPATIENT)
Dept: PRIMARY CARE CLINIC | Facility: CLINIC | Age: 47
End: 2022-01-12
Payer: MEDICAID

## 2022-01-12 DIAGNOSIS — Z20.822 CONTACT WITH AND (SUSPECTED) EXPOSURE TO COVID-19: ICD-10-CM

## 2022-01-12 LAB
CTP QC/QA: YES
SARS-COV-2 AG RESP QL IA.RAPID: POSITIVE

## 2022-01-12 PROCEDURE — 87811 SARS-COV-2 COVID19 W/OPTIC: CPT

## 2022-02-04 ENCOUNTER — LAB VISIT (OUTPATIENT)
Dept: LAB | Facility: HOSPITAL | Age: 47
End: 2022-02-04
Attending: INTERNAL MEDICINE
Payer: MEDICAID

## 2022-02-04 ENCOUNTER — OFFICE VISIT (OUTPATIENT)
Dept: RHEUMATOLOGY | Facility: CLINIC | Age: 47
End: 2022-02-04
Payer: MEDICAID

## 2022-02-04 VITALS
HEIGHT: 70 IN | RESPIRATION RATE: 18 BRPM | HEART RATE: 76 BPM | DIASTOLIC BLOOD PRESSURE: 73 MMHG | TEMPERATURE: 98 F | BODY MASS INDEX: 28.12 KG/M2 | WEIGHT: 196.44 LBS | SYSTOLIC BLOOD PRESSURE: 135 MMHG | OXYGEN SATURATION: 97 %

## 2022-02-04 DIAGNOSIS — Z71.89 COUNSELING AND COORDINATION OF CARE: ICD-10-CM

## 2022-02-04 DIAGNOSIS — Z79.899 ENCOUNTER FOR LONG-TERM (CURRENT) USE OF OTHER MEDICATIONS: ICD-10-CM

## 2022-02-04 DIAGNOSIS — M32.9 SYSTEMIC LUPUS ERYTHEMATOSUS, UNSPECIFIED SLE TYPE, UNSPECIFIED ORGAN INVOLVEMENT STATUS: Primary | ICD-10-CM

## 2022-02-04 DIAGNOSIS — M32.9 SYSTEMIC LUPUS ERYTHEMATOSUS, UNSPECIFIED SLE TYPE, UNSPECIFIED ORGAN INVOLVEMENT STATUS: ICD-10-CM

## 2022-02-04 LAB
ALBUMIN SERPL BCP-MCNC: 3.8 G/DL (ref 3.5–5.2)
ALP SERPL-CCNC: 94 U/L (ref 55–135)
ALT SERPL W/O P-5'-P-CCNC: 15 U/L (ref 10–44)
ANION GAP SERPL CALC-SCNC: 4 MMOL/L (ref 8–16)
AST SERPL-CCNC: 22 U/L (ref 10–40)
BASOPHILS # BLD AUTO: 0.02 K/UL (ref 0–0.2)
BASOPHILS NFR BLD: 0.4 % (ref 0–1.9)
BILIRUB SERPL-MCNC: 0.5 MG/DL (ref 0.1–1)
BUN SERPL-MCNC: 13 MG/DL (ref 6–20)
C3 SERPL-MCNC: 78 MG/DL (ref 50–180)
C4 SERPL-MCNC: 17 MG/DL (ref 11–44)
CALCIUM SERPL-MCNC: 9.4 MG/DL (ref 8.7–10.5)
CHLORIDE SERPL-SCNC: 106 MMOL/L (ref 95–110)
CO2 SERPL-SCNC: 28 MMOL/L (ref 23–29)
CREAT SERPL-MCNC: 1.1 MG/DL (ref 0.5–1.4)
CRP SERPL-MCNC: 16.2 MG/L (ref 0–8.2)
DIFFERENTIAL METHOD: ABNORMAL
EOSINOPHIL # BLD AUTO: 0.2 K/UL (ref 0–0.5)
EOSINOPHIL NFR BLD: 2.9 % (ref 0–8)
ERYTHROCYTE [DISTWIDTH] IN BLOOD BY AUTOMATED COUNT: 13.2 % (ref 11.5–14.5)
ERYTHROCYTE [SEDIMENTATION RATE] IN BLOOD BY WESTERGREN METHOD: 33 MM/HR (ref 0–10)
EST. GFR  (AFRICAN AMERICAN): >60 ML/MIN/1.73 M^2
EST. GFR  (NON AFRICAN AMERICAN): >60 ML/MIN/1.73 M^2
GLUCOSE SERPL-MCNC: 89 MG/DL (ref 70–110)
HCT VFR BLD AUTO: 41.6 % (ref 40–54)
HGB BLD-MCNC: 12.7 G/DL (ref 14–18)
IMM GRANULOCYTES # BLD AUTO: 0.01 K/UL (ref 0–0.04)
IMM GRANULOCYTES NFR BLD AUTO: 0.2 % (ref 0–0.5)
LYMPHOCYTES # BLD AUTO: 1.9 K/UL (ref 1–4.8)
LYMPHOCYTES NFR BLD: 37.3 % (ref 18–48)
MCH RBC QN AUTO: 27.4 PG (ref 27–31)
MCHC RBC AUTO-ENTMCNC: 30.5 G/DL (ref 32–36)
MCV RBC AUTO: 90 FL (ref 82–98)
MONOCYTES # BLD AUTO: 0.5 K/UL (ref 0.3–1)
MONOCYTES NFR BLD: 10.4 % (ref 4–15)
NEUTROPHILS # BLD AUTO: 2.5 K/UL (ref 1.8–7.7)
NEUTROPHILS NFR BLD: 48.8 % (ref 38–73)
NRBC BLD-RTO: 0 /100 WBC
PLATELET # BLD AUTO: 237 K/UL (ref 150–450)
PMV BLD AUTO: 11.3 FL (ref 9.2–12.9)
POTASSIUM SERPL-SCNC: 4.4 MMOL/L (ref 3.5–5.1)
PROT SERPL-MCNC: 8.2 G/DL (ref 6–8.4)
RBC # BLD AUTO: 4.63 M/UL (ref 4.6–6.2)
SODIUM SERPL-SCNC: 138 MMOL/L (ref 136–145)
WBC # BLD AUTO: 5.12 K/UL (ref 3.9–12.7)

## 2022-02-04 PROCEDURE — 85730 THROMBOPLASTIN TIME PARTIAL: CPT | Performed by: INTERNAL MEDICINE

## 2022-02-04 PROCEDURE — 80053 COMPREHEN METABOLIC PANEL: CPT | Performed by: INTERNAL MEDICINE

## 2022-02-04 PROCEDURE — 3075F PR MOST RECENT SYSTOLIC BLOOD PRESS GE 130-139MM HG: ICD-10-PCS | Mod: CPTII,,, | Performed by: INTERNAL MEDICINE

## 2022-02-04 PROCEDURE — 1159F PR MEDICATION LIST DOCUMENTED IN MEDICAL RECORD: ICD-10-PCS | Mod: CPTII,,, | Performed by: INTERNAL MEDICINE

## 2022-02-04 PROCEDURE — 1159F MED LIST DOCD IN RCRD: CPT | Mod: CPTII,,, | Performed by: INTERNAL MEDICINE

## 2022-02-04 PROCEDURE — 3078F PR MOST RECENT DIASTOLIC BLOOD PRESSURE < 80 MM HG: ICD-10-PCS | Mod: CPTII,,, | Performed by: INTERNAL MEDICINE

## 2022-02-04 PROCEDURE — 86160 COMPLEMENT ANTIGEN: CPT | Mod: 59 | Performed by: INTERNAL MEDICINE

## 2022-02-04 PROCEDURE — 99215 PR OFFICE/OUTPT VISIT, EST, LEVL V, 40-54 MIN: ICD-10-PCS | Mod: S$PBB,,, | Performed by: INTERNAL MEDICINE

## 2022-02-04 PROCEDURE — 3075F SYST BP GE 130 - 139MM HG: CPT | Mod: CPTII,,, | Performed by: INTERNAL MEDICINE

## 2022-02-04 PROCEDURE — 86225 DNA ANTIBODY NATIVE: CPT | Performed by: INTERNAL MEDICINE

## 2022-02-04 PROCEDURE — 83516 IMMUNOASSAY NONANTIBODY: CPT | Performed by: INTERNAL MEDICINE

## 2022-02-04 PROCEDURE — 85025 COMPLETE CBC W/AUTO DIFF WBC: CPT | Performed by: INTERNAL MEDICINE

## 2022-02-04 PROCEDURE — 86160 COMPLEMENT ANTIGEN: CPT | Performed by: INTERNAL MEDICINE

## 2022-02-04 PROCEDURE — 99999 PR PBB SHADOW E&M-EST. PATIENT-LVL III: ICD-10-PCS | Mod: PBBFAC,,, | Performed by: INTERNAL MEDICINE

## 2022-02-04 PROCEDURE — 86140 C-REACTIVE PROTEIN: CPT | Performed by: INTERNAL MEDICINE

## 2022-02-04 PROCEDURE — 3008F BODY MASS INDEX DOCD: CPT | Mod: CPTII,,, | Performed by: INTERNAL MEDICINE

## 2022-02-04 PROCEDURE — 3078F DIAST BP <80 MM HG: CPT | Mod: CPTII,,, | Performed by: INTERNAL MEDICINE

## 2022-02-04 PROCEDURE — 99213 OFFICE O/P EST LOW 20 MIN: CPT | Mod: PBBFAC,PN | Performed by: INTERNAL MEDICINE

## 2022-02-04 PROCEDURE — 99215 OFFICE O/P EST HI 40 MIN: CPT | Mod: S$PBB,,, | Performed by: INTERNAL MEDICINE

## 2022-02-04 PROCEDURE — 86146 BETA-2 GLYCOPROTEIN ANTIBODY: CPT | Mod: 59 | Performed by: INTERNAL MEDICINE

## 2022-02-04 PROCEDURE — 86147 CARDIOLIPIN ANTIBODY EA IG: CPT | Performed by: INTERNAL MEDICINE

## 2022-02-04 PROCEDURE — 36415 COLL VENOUS BLD VENIPUNCTURE: CPT | Mod: PN | Performed by: INTERNAL MEDICINE

## 2022-02-04 PROCEDURE — 99999 PR PBB SHADOW E&M-EST. PATIENT-LVL III: CPT | Mod: PBBFAC,,, | Performed by: INTERNAL MEDICINE

## 2022-02-04 PROCEDURE — 85652 RBC SED RATE AUTOMATED: CPT | Performed by: INTERNAL MEDICINE

## 2022-02-04 PROCEDURE — 3008F PR BODY MASS INDEX (BMI) DOCUMENTED: ICD-10-PCS | Mod: CPTII,,, | Performed by: INTERNAL MEDICINE

## 2022-02-04 RX ORDER — METHOTREXATE 2.5 MG/1
20 TABLET ORAL
Qty: 40 TABLET | Refills: 11 | Status: SHIPPED | OUTPATIENT
Start: 2022-02-04 | End: 2022-04-01 | Stop reason: SDUPTHER

## 2022-02-04 RX ORDER — FOLIC ACID 1 MG/1
1 TABLET ORAL DAILY
Qty: 30 TABLET | Refills: 4 | Status: SHIPPED | OUTPATIENT
Start: 2022-02-04 | End: 2022-04-01 | Stop reason: SDUPTHER

## 2022-02-04 NOTE — PROGRESS NOTES
RHEUMATOLOGY OUTPATIENT CLINIC NOTE    2/4/2022    Attending Rheumatologist: Rich Hooper  Primary Care Provider: Harris Villalpando MD   Physician Requesting Consultation: No referring provider defined for this encounter.  Chief Complaint/Reason For Consultation:  No chief complaint on file.      Subjective:       PEDRO PABLO Mathur is a 46 y.o. Black or  male with medical history noted below presents for evaluation of lupus.    Patient notes Raynaud's for over 10 years.  Notes around 2007 seen swelling of the fingers and hands was seen by rheumatologists noted to have positive SARA, Grajeda, RNP, SSA and cryoglobulins.  Patient was last seen by Dr. Valenzuela October 19, 2021 at which point confirmed diagnosis of lupus and started on Plaquenil.  Patient notes since starting medications better control of pain and swelling but still has flare ups.  Endorses rash, Raynaud's.     Review of Systems   Constitutional: Negative for chills, fatigue, fever and unexpected weight change.   HENT: Negative for mouth sores.    Eyes: Negative for redness and eye dryness.   Respiratory: Negative for cough and shortness of breath.    Cardiovascular: Negative for chest pain.   Gastrointestinal: Negative for abdominal distention, constipation, diarrhea, nausea, vomiting and reflux.   Musculoskeletal: Positive for arthralgias. Negative for back pain, gait problem, joint swelling, leg pain, myalgias, neck pain, neck stiffness and joint deformity.   Integumentary:  Positive for rash.   Neurological: Negative for weakness, numbness, headaches and memory loss.   Hematological: Negative for adenopathy. Does not bruise/bleed easily.   Psychiatric/Behavioral: Negative for confusion, decreased concentration, sleep disturbance and suicidal ideas. The patient is not nervous/anxious.    All other systems reviewed and are negative.       Chronic comorbid conditions affecting medical decision making today:  No past medical  history on file.  Past Surgical History:   Procedure Laterality Date    SPINE SURGERY       Family History   Problem Relation Age of Onset    Cancer Father 70        Prostate cancer.    Hypertension Father      Social History     Substance and Sexual Activity   Alcohol Use None     Social History     Tobacco Use   Smoking Status Never Smoker   Smokeless Tobacco Never Used     Social History     Substance and Sexual Activity   Drug Use Not on file       Current Outpatient Medications:     hydrOXYchloroQUINE (PLAQUENIL) 200 mg tablet, Take 2 tablets (400 mg total) by mouth once daily., Disp: 60 tablet, Rfl: 3    acetaminophen (TYLENOL) 100 mg/mL suspension, Take by mouth every 4 (four) hours as needed for Temperature greater than., Disp: , Rfl:     ciprofloxacin HCl (CILOXAN) 0.3 % ophthalmic solution, Apply 3 drops to the right ear twice daily (Patient not taking: No sig reported), Disp: 10 mL, Rfl: 0    fluticasone propionate (FLONASE) 50 mcg/actuation nasal spray, Spray 2 sprays (100 mcg total) by Each Nare route once daily. (Patient not taking: No sig reported), Disp: 15.8 mL, Rfl: 12    folic acid (FOLVITE) 1 MG tablet, Take 1 tablet (1 mg total) by mouth once daily., Disp: 30 tablet, Rfl: 4    methotrexate 2.5 MG Tab, Take 8 tablets (20 mg total) by mouth every 7 days., Disp: 40 tablet, Rfl: 11    prednisoLONE acetate (PRED FORTE) 1 % DrpS, apply 3 drops to right ear twice daily for 10 days (Patient not taking: No sig reported), Disp: 10 mL, Rfl: 0     Objective:         Vitals:    02/04/22 0851   BP: 135/73   Pulse: 76   Resp: 18   Temp: 97.9 °F (36.6 °C)     Physical Exam   Musculoskeletal:      Comments: Can make fist, no synovitis         Reviewed old and all outside pertinent medical records available.    All lab results personally reviewed and interpreted by me.  Lab Results   Component Value Date    WBC 3.91 10/04/2021    HGB 12.0 (L) 10/04/2021    HCT 37.6 (L) 10/04/2021    MCV 86 10/04/2021     MCH 27.5 10/04/2021    MCHC 31.9 (L) 10/04/2021    RDW 14.0 10/04/2021     10/04/2021    MPV 11.7 10/04/2021       Lab Results   Component Value Date     10/04/2021    K 4.1 10/04/2021     10/04/2021    CO2 23 10/04/2021    GLU 82 10/04/2021    BUN 13 10/04/2021    CALCIUM 9.2 10/04/2021    PROT 8.3 10/04/2021    ALBUMIN 3.5 10/04/2021    BILITOT 0.6 10/04/2021    AST 19 10/04/2021    ALKPHOS 103 10/04/2021    ALT 12 10/04/2021       Lab Results   Component Value Date    COLORU Yellow 10/11/2021    APPEARANCEUA Clear 10/11/2021    SPECGRAV 1.030 10/11/2021    PHUR 6.0 10/11/2021    PROTEINUA Negative 10/11/2021    KETONESU Negative 10/11/2021    LEUKOCYTESUR Negative 10/11/2021    NITRITE Negative 10/11/2021       Lab Results   Component Value Date    CRP 18.3 (H) 10/04/2021       Lab Results   Component Value Date    SEDRATE 116 (H) 10/04/2021       Lab Results   Component Value Date    RF <13.0 10/04/2021    SEDRATE 116 (H) 10/04/2021       No components found for: 25OHVITDTOT, 78PIQGEE3, 96WGBKCF2, METHODNOTE    No results found for: URICACID    No components found for: TSPOTTB        Imaging:  All imaging reviewed and independently interpreted by me.         ASSESSMENT / PLAN:     Jone Mathur is a 46 y.o. Black or  male with:      1. Systemic lupus erythematosus, unspecified SLE type, unspecified organ involvement status  - patient with Hx of SLE  - better control with HCQ, though still having swelling and pain in his hands  - discussed diagnosis and management  - will add MTX 8 tabs weekly, SE discussed   - sun screen advised  - warmth precautions  - continue HCQ 400mg daily  - labs  - reassurance   - C-Reactive Protein; Future  - CBC Auto Differential; Future  - Comprehensive Metabolic Panel; Future  - Protein/Creatinine Ratio, Urine; Future  - C4 Complement; Future  - C3 Complement; Future  - Anti-Histone Antibody; Future  - Sedimentation rate; Future  - Urinalysis;  Future  - Anti-DNA Ab, Double-Stranded; Future  - DRVVT; Future  - Cardiolipin antibody; Future  - Beta-2 Glycoprotein Abs (IgA, IgG, IgM); Future  - methotrexate 2.5 MG Tab; Take 8 tablets (20 mg total) by mouth every 7 days.  Dispense: 40 tablet; Refill: 11  - folic acid (FOLVITE) 1 MG tablet; Take 1 tablet (1 mg total) by mouth once daily.  Dispense: 30 tablet; Refill: 4    2. Encounter for long-term (current) use of other medications  - annual EYE exam   - daily FA  - quarterly labs    3. Other specified counseling  - over 10 minutes spent regarding below topics:  - Immunization counseling done.  - Weight loss counseling done.  - Nutrition and exercise counseling.  - Limitation of alcohol consumption.  - Regular exercise:  Aerobic and resistance.  - Medication counseling provided.      Follow up in about 8 weeks (around 4/1/2022).    Method of contact with patient concerns: [facility]    Disclaimer:  This note is prepared using voice recognition software and as such is likely to have errors and has not been proof read. Please contact me for questions.     Time spent: 50 minutes in face to face discussion concerning diagnosis, prognosis, review of lab and test results, benefits of treatment as well as management of disease, counseling of patient and coordination of care between various health care providers.  Greater than half the time spent was used for coordination of care and counseling of patient.    Rich Hooper M.D.  Rheumatology Department   [facility]

## 2022-02-08 LAB — DSDNA AB SER-ACNC: NORMAL [IU]/ML

## 2022-02-09 LAB
B2 GLYCOPROT1 IGA SER QL: <9 SAU
B2 GLYCOPROT1 IGG SER QL: <9 SGU
B2 GLYCOPROT1 IGM SER QL: <9 SMU
CARDIOLIPIN IGG SER IA-ACNC: <9.4 GPL (ref 0–14.99)
CARDIOLIPIN IGM SER IA-ACNC: 12.5 MPL (ref 0–12.49)

## 2022-02-10 LAB — HISTONE IGG SER IA-ACNC: 1 UNITS (ref 0–0.9)

## 2022-02-12 LAB
APTT IMM NP PPP: NORMAL SEC (ref 32–48)
APTT P HEP NEUT PPP: NORMAL SEC (ref 32–48)
CONFIRM APTT STACLOT: NORMAL
DRVVT SCREEN TO CONFIRM RATIO: NORMAL RATIO
LA 3 SCREEN W REFLEX-IMP: NORMAL
LA NT DPL PPP QL: NORMAL
MIXING DRVVT: NORMAL SEC (ref 33–44)
PROTHROMBIN TIME: 13.6 SEC (ref 12–15.5)
REPTILASE TIME: NORMAL SEC
SCREEN APTT: 40 SEC (ref 32–48)
SCREEN DRVVT: 44 SEC (ref 33–44)
THROMBIN TIME: NORMAL SEC (ref 14.7–19.5)

## 2022-02-15 ENCOUNTER — TELEPHONE (OUTPATIENT)
Dept: RHEUMATOLOGY | Facility: CLINIC | Age: 47
End: 2022-02-15
Payer: MEDICAID

## 2022-02-15 NOTE — TELEPHONE ENCOUNTER
----- Message from Rich Hooper MD sent at 2/14/2022 11:21 AM CST -----  Please let him know labs are stable. We will discuss further therapy at next visit if he does not improve with Methotrexate.   Dr. Hooper

## 2022-03-21 ENCOUNTER — PATIENT MESSAGE (OUTPATIENT)
Dept: ADMINISTRATIVE | Facility: HOSPITAL | Age: 47
End: 2022-03-21
Payer: MEDICAID

## 2022-04-01 ENCOUNTER — OFFICE VISIT (OUTPATIENT)
Dept: RHEUMATOLOGY | Facility: CLINIC | Age: 47
End: 2022-04-01

## 2022-04-01 ENCOUNTER — LAB VISIT (OUTPATIENT)
Dept: LAB | Facility: HOSPITAL | Age: 47
End: 2022-04-01
Attending: INTERNAL MEDICINE

## 2022-04-01 VITALS
RESPIRATION RATE: 20 BRPM | HEART RATE: 68 BPM | BODY MASS INDEX: 27.42 KG/M2 | HEIGHT: 70 IN | SYSTOLIC BLOOD PRESSURE: 133 MMHG | OXYGEN SATURATION: 98 % | DIASTOLIC BLOOD PRESSURE: 89 MMHG | WEIGHT: 191.56 LBS

## 2022-04-01 DIAGNOSIS — M32.19 OTHER SYSTEMIC LUPUS ERYTHEMATOSUS WITH OTHER ORGAN INVOLVEMENT: ICD-10-CM

## 2022-04-01 DIAGNOSIS — M32.9 SYSTEMIC LUPUS ERYTHEMATOSUS, UNSPECIFIED SLE TYPE, UNSPECIFIED ORGAN INVOLVEMENT STATUS: ICD-10-CM

## 2022-04-01 DIAGNOSIS — Z79.899 ENCOUNTER FOR LONG-TERM (CURRENT) USE OF OTHER MEDICATIONS: ICD-10-CM

## 2022-04-01 DIAGNOSIS — Z71.89 COUNSELING AND COORDINATION OF CARE: ICD-10-CM

## 2022-04-01 DIAGNOSIS — M32.9 SYSTEMIC LUPUS ERYTHEMATOSUS, UNSPECIFIED SLE TYPE, UNSPECIFIED ORGAN INVOLVEMENT STATUS: Primary | ICD-10-CM

## 2022-04-01 LAB
ALBUMIN SERPL BCP-MCNC: 3.9 G/DL (ref 3.5–5.2)
ALP SERPL-CCNC: 94 U/L (ref 55–135)
ALT SERPL W/O P-5'-P-CCNC: 14 U/L (ref 10–44)
ANION GAP SERPL CALC-SCNC: 5 MMOL/L (ref 8–16)
AST SERPL-CCNC: 22 U/L (ref 10–40)
BASOPHILS # BLD AUTO: 0.04 K/UL (ref 0–0.2)
BASOPHILS NFR BLD: 1 % (ref 0–1.9)
BILIRUB SERPL-MCNC: 0.7 MG/DL (ref 0.1–1)
BUN SERPL-MCNC: 12 MG/DL (ref 6–20)
CALCIUM SERPL-MCNC: 9.5 MG/DL (ref 8.7–10.5)
CHLORIDE SERPL-SCNC: 106 MMOL/L (ref 95–110)
CO2 SERPL-SCNC: 28 MMOL/L (ref 23–29)
CREAT SERPL-MCNC: 1 MG/DL (ref 0.5–1.4)
DIFFERENTIAL METHOD: ABNORMAL
EOSINOPHIL # BLD AUTO: 0.2 K/UL (ref 0–0.5)
EOSINOPHIL NFR BLD: 4.4 % (ref 0–8)
ERYTHROCYTE [DISTWIDTH] IN BLOOD BY AUTOMATED COUNT: 14.1 % (ref 11.5–14.5)
EST. GFR  (AFRICAN AMERICAN): >60 ML/MIN/1.73 M^2
EST. GFR  (NON AFRICAN AMERICAN): >60 ML/MIN/1.73 M^2
GLUCOSE SERPL-MCNC: 89 MG/DL (ref 70–110)
HCT VFR BLD AUTO: 41.3 % (ref 40–54)
HGB BLD-MCNC: 12.8 G/DL (ref 14–18)
IMM GRANULOCYTES # BLD AUTO: 0.01 K/UL (ref 0–0.04)
IMM GRANULOCYTES NFR BLD AUTO: 0.3 % (ref 0–0.5)
LYMPHOCYTES # BLD AUTO: 1.7 K/UL (ref 1–4.8)
LYMPHOCYTES NFR BLD: 44.1 % (ref 18–48)
MCH RBC QN AUTO: 27.3 PG (ref 27–31)
MCHC RBC AUTO-ENTMCNC: 31 G/DL (ref 32–36)
MCV RBC AUTO: 88 FL (ref 82–98)
MONOCYTES # BLD AUTO: 0.4 K/UL (ref 0.3–1)
MONOCYTES NFR BLD: 10.3 % (ref 4–15)
NEUTROPHILS # BLD AUTO: 1.6 K/UL (ref 1.8–7.7)
NEUTROPHILS NFR BLD: 39.9 % (ref 38–73)
NRBC BLD-RTO: 0 /100 WBC
PLATELET # BLD AUTO: 231 K/UL (ref 150–450)
PMV BLD AUTO: 11.5 FL (ref 9.2–12.9)
POTASSIUM SERPL-SCNC: 4.7 MMOL/L (ref 3.5–5.1)
PROT SERPL-MCNC: 8 G/DL (ref 6–8.4)
RBC # BLD AUTO: 4.69 M/UL (ref 4.6–6.2)
SODIUM SERPL-SCNC: 139 MMOL/L (ref 136–145)
WBC # BLD AUTO: 3.88 K/UL (ref 3.9–12.7)

## 2022-04-01 PROCEDURE — 99999 PR PBB SHADOW E&M-EST. PATIENT-LVL III: CPT | Mod: PBBFAC,,, | Performed by: INTERNAL MEDICINE

## 2022-04-01 PROCEDURE — 80053 COMPREHEN METABOLIC PANEL: CPT | Performed by: INTERNAL MEDICINE

## 2022-04-01 PROCEDURE — 99214 OFFICE O/P EST MOD 30 MIN: CPT | Mod: S$PBB,,, | Performed by: INTERNAL MEDICINE

## 2022-04-01 PROCEDURE — 99999 PR PBB SHADOW E&M-EST. PATIENT-LVL III: ICD-10-PCS | Mod: PBBFAC,,, | Performed by: INTERNAL MEDICINE

## 2022-04-01 PROCEDURE — 85025 COMPLETE CBC W/AUTO DIFF WBC: CPT | Performed by: INTERNAL MEDICINE

## 2022-04-01 PROCEDURE — 99213 OFFICE O/P EST LOW 20 MIN: CPT | Mod: PBBFAC,PN | Performed by: INTERNAL MEDICINE

## 2022-04-01 PROCEDURE — 99214 PR OFFICE/OUTPT VISIT, EST, LEVL IV, 30-39 MIN: ICD-10-PCS | Mod: S$PBB,,, | Performed by: INTERNAL MEDICINE

## 2022-04-01 PROCEDURE — 36415 COLL VENOUS BLD VENIPUNCTURE: CPT | Mod: PN | Performed by: INTERNAL MEDICINE

## 2022-04-01 RX ORDER — METHOTREXATE 2.5 MG/1
20 TABLET ORAL
Qty: 40 TABLET | Refills: 11 | Status: SHIPPED | OUTPATIENT
Start: 2022-04-01 | End: 2022-07-26 | Stop reason: SDUPTHER

## 2022-04-01 RX ORDER — FOLIC ACID 1 MG/1
1 TABLET ORAL DAILY
Qty: 30 TABLET | Refills: 4 | Status: SHIPPED | OUTPATIENT
Start: 2022-04-01 | End: 2022-07-26 | Stop reason: SDUPTHER

## 2022-04-01 RX ORDER — HYDROXYCHLOROQUINE SULFATE 200 MG/1
400 TABLET, FILM COATED ORAL DAILY
Qty: 60 TABLET | Refills: 3 | Status: SHIPPED | OUTPATIENT
Start: 2022-04-01 | End: 2022-05-11

## 2022-04-01 NOTE — PROGRESS NOTES
RHEUMATOLOGY OUTPATIENT CLINIC NOTE    4/1/2022    Attending Rheumatologist: Rich Hooper  Primary Care Provider: Harris Villalpando MD   Physician Requesting Consultation: No referring provider defined for this encounter.  Chief Complaint/Reason For Consultation:  No chief complaint on file.      Subjective:       PEDRO PABLO Mathur is a 46 y.o. Black or  male with medical history noted below presents for evaluation of lupus.  Patient notes Raynaud's for over 10 years.  Notes around 2007 seen swelling of the fingers and hands was seen by rheumatologists noted to have positive SARA, Grajeda, RNP, SSA and cryoglobulins.  Patient was last seen by Dr. Valenzuela October 19, 2021 at which point confirmed diagnosis of lupus and started on Plaquenil.  Patient notes since starting medications better control of pain and swelling but still has flare ups.  Endorses rash, Raynaud's.     Today  Patient here for follow up.   Last visit started on MTX for SLE. Patient notes since taking medication improvement of joint pain and rash. Feels well. Tolerating meds.     Review of Systems   Constitutional: Negative for chills, fatigue, fever and unexpected weight change.   HENT: Negative for mouth sores.    Eyes: Negative for redness and eye dryness.   Respiratory: Negative for cough and shortness of breath.    Cardiovascular: Negative for chest pain.   Gastrointestinal: Negative for abdominal distention, constipation, diarrhea, nausea, vomiting and reflux.   Musculoskeletal: Negative for arthralgias, back pain, gait problem, joint swelling, leg pain, myalgias, neck pain, neck stiffness and joint deformity.   Integumentary:  Negative for rash.   Neurological: Negative for weakness, numbness, headaches and memory loss.   Hematological: Negative for adenopathy. Does not bruise/bleed easily.   Psychiatric/Behavioral: Negative for confusion, decreased concentration, sleep disturbance and suicidal ideas. The patient  is not nervous/anxious.    All other systems reviewed and are negative.       Chronic comorbid conditions affecting medical decision making today:  No past medical history on file.  Past Surgical History:   Procedure Laterality Date    SPINE SURGERY       Family History   Problem Relation Age of Onset    Cancer Father 70        Prostate cancer.    Hypertension Father      Social History     Substance and Sexual Activity   Alcohol Use None     Social History     Tobacco Use   Smoking Status Never Smoker   Smokeless Tobacco Never Used     Social History     Substance and Sexual Activity   Drug Use Not on file       Current Outpatient Medications:     acetaminophen (TYLENOL) 100 mg/mL suspension, Take by mouth every 4 (four) hours as needed for Temperature greater than., Disp: , Rfl:     ciprofloxacin HCl (CILOXAN) 0.3 % ophthalmic solution, Apply 3 drops to the right ear twice daily (Patient not taking: No sig reported), Disp: 10 mL, Rfl: 0    fluticasone propionate (FLONASE) 50 mcg/actuation nasal spray, Spray 2 sprays (100 mcg total) by Each Nare route once daily. (Patient not taking: No sig reported), Disp: 15.8 mL, Rfl: 12    folic acid (FOLVITE) 1 MG tablet, Take 1 tablet (1 mg total) by mouth once daily., Disp: 30 tablet, Rfl: 4    hydrOXYchloroQUINE (PLAQUENIL) 200 mg tablet, Take 2 tablets (400 mg total) by mouth once daily., Disp: 60 tablet, Rfl: 3    methotrexate 2.5 MG Tab, Take 8 tablets (20 mg total) by mouth every 7 days., Disp: 40 tablet, Rfl: 11    prednisoLONE acetate (PRED FORTE) 1 % DrpS, apply 3 drops to right ear twice daily for 10 days (Patient not taking: No sig reported), Disp: 10 mL, Rfl: 0     Objective:         There were no vitals filed for this visit.  Physical Exam   Musculoskeletal:      Comments: Can make fist, no synovitis         Reviewed old and all outside pertinent medical records available.    All lab results personally reviewed and interpreted by me.  Lab Results    Component Value Date    WBC 5.12 02/04/2022    HGB 12.7 (L) 02/04/2022    HCT 41.6 02/04/2022    MCV 90 02/04/2022    MCH 27.4 02/04/2022    MCHC 30.5 (L) 02/04/2022    RDW 13.2 02/04/2022     02/04/2022    MPV 11.3 02/04/2022       Lab Results   Component Value Date     02/04/2022    K 4.4 02/04/2022     02/04/2022    CO2 28 02/04/2022    GLU 89 02/04/2022    BUN 13 02/04/2022    CALCIUM 9.4 02/04/2022    PROT 8.2 02/04/2022    ALBUMIN 3.8 02/04/2022    BILITOT 0.5 02/04/2022    AST 22 02/04/2022    ALKPHOS 94 02/04/2022    ALT 15 02/04/2022       Lab Results   Component Value Date    COLORU Yellow 02/04/2022    APPEARANCEUA Clear 02/04/2022    SPECGRAV 1.020 02/04/2022    PHUR 7.0 02/04/2022    PROTEINUA Trace (A) 02/04/2022    KETONESU Negative 02/04/2022    LEUKOCYTESUR Negative 02/04/2022    NITRITE Negative 02/04/2022    UROBILINOGEN Negative 02/04/2022       Lab Results   Component Value Date    CRP 16.2 (H) 02/04/2022       Lab Results   Component Value Date    SEDRATE 33 (H) 02/04/2022       Lab Results   Component Value Date    RF <13.0 10/04/2021    SEDRATE 33 (H) 02/04/2022       No components found for: 25OHVITDTOT, 26DVMUBT7, 82LMXCPH4, METHODNOTE    No results found for: URICACID    No components found for: TSPOTTB        Imaging:  All imaging reviewed and independently interpreted by me.         ASSESSMENT / PLAN:     Jone Mathur is a 46 y.o. Black or  male with:      1. Systemic lupus erythematosus, unspecified SLE type, unspecified organ involvement status  - patient with Hx of SLE  - improving  - continue MTX 8 tabs weekly, daily HCQ 400mg daily   - sun screen advised  - warmth precautions  - update labs  - reassurance     2. Encounter for long-term (current) use of other medications  - annual EYE exam   - daily FA  - labs today     3. Other specified counseling  - over 10 minutes spent regarding below topics:  - Immunization counseling done.  - Weight  loss counseling done.  - Nutrition and exercise counseling.  - Limitation of alcohol consumption.  - Regular exercise:  Aerobic and resistance.  - Medication counseling provided.      Follow up in about 3 months (around 7/1/2022).    Method of contact with patient concerns: [facility]    Disclaimer:  This note is prepared using voice recognition software and as such is likely to have errors and has not been proof read. Please contact me for questions.     Time spent: 30 minutes in face to face discussion concerning diagnosis, prognosis, review of lab and test results, benefits of treatment as well as management of disease, counseling of patient and coordination of care between various health care providers.  Greater than half the time spent was used for coordination of care and counseling of patient.    Rich Hooper M.D.  Rheumatology Department   [facility]

## 2022-07-15 ENCOUNTER — OFFICE VISIT (OUTPATIENT)
Dept: INTERNAL MEDICINE | Facility: CLINIC | Age: 47
End: 2022-07-15

## 2022-07-15 VITALS
BODY MASS INDEX: 28.22 KG/M2 | DIASTOLIC BLOOD PRESSURE: 81 MMHG | HEART RATE: 57 BPM | SYSTOLIC BLOOD PRESSURE: 131 MMHG | WEIGHT: 196.69 LBS

## 2022-07-15 DIAGNOSIS — M32.9 SYSTEMIC LUPUS ERYTHEMATOSUS, UNSPECIFIED SLE TYPE, UNSPECIFIED ORGAN INVOLVEMENT STATUS: Primary | ICD-10-CM

## 2022-07-15 PROCEDURE — 99499 NO LOS: ICD-10-PCS | Mod: S$PBB,,, | Performed by: INTERNAL MEDICINE

## 2022-07-15 PROCEDURE — 99999 PR PBB SHADOW E&M-EST. PATIENT-LVL III: ICD-10-PCS | Mod: PBBFAC,,, | Performed by: INTERNAL MEDICINE

## 2022-07-15 PROCEDURE — 99999 PR PBB SHADOW E&M-EST. PATIENT-LVL III: CPT | Mod: PBBFAC,,, | Performed by: INTERNAL MEDICINE

## 2022-07-15 PROCEDURE — 99499 UNLISTED E&M SERVICE: CPT | Mod: S$PBB,,, | Performed by: INTERNAL MEDICINE

## 2022-07-15 PROCEDURE — 99213 OFFICE O/P EST LOW 20 MIN: CPT | Mod: PBBFAC | Performed by: INTERNAL MEDICINE

## 2022-07-25 NOTE — PROGRESS NOTES
Mr Mathur is here for follow up SLE. Since his initial visit with me and diagnosed with lupus as per lab work, he has seen Rheumatology staff for management. He is currently on Plaquenil and MTX, tolerating both of these meds well. His symptoms have improved significantly and will continue with these and to follow up with Rheumatology as indicated. Discussed his lab work. He is compliant with his medications and will RTC with me in about 2-3 months; sooner if needed.

## 2022-07-26 ENCOUNTER — OFFICE VISIT (OUTPATIENT)
Dept: RHEUMATOLOGY | Facility: CLINIC | Age: 47
End: 2022-07-26

## 2022-07-26 VITALS
HEIGHT: 70 IN | DIASTOLIC BLOOD PRESSURE: 77 MMHG | HEART RATE: 76 BPM | BODY MASS INDEX: 28.15 KG/M2 | WEIGHT: 196.63 LBS | RESPIRATION RATE: 20 BRPM | OXYGEN SATURATION: 99 % | SYSTOLIC BLOOD PRESSURE: 125 MMHG

## 2022-07-26 DIAGNOSIS — Z79.899 ENCOUNTER FOR LONG-TERM (CURRENT) USE OF OTHER MEDICATIONS: ICD-10-CM

## 2022-07-26 DIAGNOSIS — Z71.89 COUNSELING AND COORDINATION OF CARE: ICD-10-CM

## 2022-07-26 DIAGNOSIS — M32.9 SYSTEMIC LUPUS ERYTHEMATOSUS, UNSPECIFIED SLE TYPE, UNSPECIFIED ORGAN INVOLVEMENT STATUS: Primary | ICD-10-CM

## 2022-07-26 PROCEDURE — 99214 PR OFFICE/OUTPT VISIT, EST, LEVL IV, 30-39 MIN: ICD-10-PCS | Mod: S$PBB,,, | Performed by: INTERNAL MEDICINE

## 2022-07-26 PROCEDURE — 99213 OFFICE O/P EST LOW 20 MIN: CPT | Mod: PBBFAC,PN | Performed by: INTERNAL MEDICINE

## 2022-07-26 PROCEDURE — 99999 PR PBB SHADOW E&M-EST. PATIENT-LVL III: CPT | Mod: PBBFAC,,, | Performed by: INTERNAL MEDICINE

## 2022-07-26 PROCEDURE — 99214 OFFICE O/P EST MOD 30 MIN: CPT | Mod: S$PBB,,, | Performed by: INTERNAL MEDICINE

## 2022-07-26 PROCEDURE — 99999 PR PBB SHADOW E&M-EST. PATIENT-LVL III: ICD-10-PCS | Mod: PBBFAC,,, | Performed by: INTERNAL MEDICINE

## 2022-07-26 RX ORDER — HYDROXYCHLOROQUINE SULFATE 200 MG/1
400 TABLET, FILM COATED ORAL DAILY
Qty: 60 TABLET | Refills: 3 | Status: SHIPPED | OUTPATIENT
Start: 2022-07-26 | End: 2022-10-26 | Stop reason: SDUPTHER

## 2022-07-26 RX ORDER — METHOTREXATE 2.5 MG/1
20 TABLET ORAL
Qty: 40 TABLET | Refills: 11 | Status: SHIPPED | OUTPATIENT
Start: 2022-07-26 | End: 2022-09-14 | Stop reason: SDUPTHER

## 2022-07-26 RX ORDER — FOLIC ACID 1 MG/1
1 TABLET ORAL DAILY
Qty: 30 TABLET | Refills: 4 | Status: SHIPPED | OUTPATIENT
Start: 2022-07-26 | End: 2022-09-19 | Stop reason: SDUPTHER

## 2022-07-26 NOTE — PROGRESS NOTES
RHEUMATOLOGY OUTPATIENT CLINIC NOTE    7/26/2022    Attending Rheumatologist: Rich Hooper  Primary Care Provider: Harris Villalpando MD   Physician Requesting Consultation: No referring provider defined for this encounter.  Chief Complaint/Reason For Consultation:  No chief complaint on file.      Subjective:       PEDRO PABLO Mathur is a 46 y.o. Black or  male with medical history noted below presents for evaluation of lupus.  Patient notes Raynaud's for over 10 years.  Notes around 2007 seen swelling of the fingers and hands was seen by rheumatologists noted to have positive SARA, Grajeda, RNP, SSA and cryoglobulins.  Patient was last seen by Dr. Valenzuela October 19, 2021 at which point confirmed diagnosis of lupus and started on Plaquenil.  Patient notes since starting medications better control of pain and swelling but still has flare ups.  Endorses rash, Raynaud's.     Today  Patient here for follow up.   Last visit SLE management continued. He notes he has been doing well with current regimen. Has no specific complaints. Tolerating meds.      Review of Systems   Constitutional: Negative for chills, fatigue, fever and unexpected weight change.   HENT: Negative for mouth sores.    Eyes: Negative for redness and eye dryness.   Respiratory: Negative for cough and shortness of breath.    Cardiovascular: Negative for chest pain.   Gastrointestinal: Negative for abdominal distention, constipation, diarrhea, nausea, vomiting and reflux.   Musculoskeletal: Negative for arthralgias, back pain, gait problem, joint swelling, leg pain, myalgias, neck pain, neck stiffness and joint deformity.   Integumentary:  Negative for rash.   Neurological: Negative for weakness, numbness, headaches and memory loss.   Hematological: Negative for adenopathy. Does not bruise/bleed easily.   Psychiatric/Behavioral: Negative for confusion, decreased concentration, sleep disturbance and suicidal ideas. The patient  is not nervous/anxious.    All other systems reviewed and are negative.       Chronic comorbid conditions affecting medical decision making today:  No past medical history on file.  Past Surgical History:   Procedure Laterality Date    SPINE SURGERY       Family History   Problem Relation Age of Onset    Cancer Father 70        Prostate cancer.    Hypertension Father      Social History     Substance and Sexual Activity   Alcohol Use None     Social History     Tobacco Use   Smoking Status Never Smoker   Smokeless Tobacco Never Used     Social History     Substance and Sexual Activity   Drug Use Not on file       Current Outpatient Medications:     acetaminophen (TYLENOL) 100 mg/mL suspension, Take by mouth every 4 (four) hours as needed for Temperature greater than., Disp: , Rfl:     ciprofloxacin HCl (CILOXAN) 0.3 % ophthalmic solution, Apply 3 drops to the right ear twice daily (Patient not taking: No sig reported), Disp: 10 mL, Rfl: 0    fluticasone propionate (FLONASE) 50 mcg/actuation nasal spray, Spray 2 sprays (100 mcg total) by Each Nare route once daily., Disp: 15.8 mL, Rfl: 12    folic acid (FOLVITE) 1 MG tablet, Take 1 tablet (1 mg total) by mouth once daily., Disp: 30 tablet, Rfl: 4    hydrOXYchloroQUINE (PLAQUENIL) 200 mg tablet, Take 2 tablets (400 mg total) by mouth once daily., Disp: 60 tablet, Rfl: 3    methotrexate 2.5 MG Tab, Take 8 tablets (20 mg total) by mouth every 7 days., Disp: 40 tablet, Rfl: 11    prednisoLONE acetate (PRED FORTE) 1 % DrpS, apply 3 drops to right ear twice daily for 10 days (Patient not taking: No sig reported), Disp: 10 mL, Rfl: 0     Objective:         There were no vitals filed for this visit.  Physical Exam   Musculoskeletal:      Comments: Can make fist, no synovitis         Reviewed old and all outside pertinent medical records available.    All lab results personally reviewed and interpreted by me.  Lab Results   Component Value Date    WBC 3.88 (L)  04/01/2022    HGB 12.8 (L) 04/01/2022    HCT 41.3 04/01/2022    MCV 88 04/01/2022    MCH 27.3 04/01/2022    MCHC 31.0 (L) 04/01/2022    RDW 14.1 04/01/2022     04/01/2022    MPV 11.5 04/01/2022       Lab Results   Component Value Date     04/01/2022    K 4.7 04/01/2022     04/01/2022    CO2 28 04/01/2022    GLU 89 04/01/2022    BUN 12 04/01/2022    CALCIUM 9.5 04/01/2022    PROT 8.0 04/01/2022    ALBUMIN 3.9 04/01/2022    BILITOT 0.7 04/01/2022    AST 22 04/01/2022    ALKPHOS 94 04/01/2022    ALT 14 04/01/2022       Lab Results   Component Value Date    COLORU Yellow 02/04/2022    APPEARANCEUA Clear 02/04/2022    SPECGRAV 1.020 02/04/2022    PHUR 7.0 02/04/2022    PROTEINUA Trace (A) 02/04/2022    KETONESU Negative 02/04/2022    LEUKOCYTESUR Negative 02/04/2022    NITRITE Negative 02/04/2022    UROBILINOGEN Negative 02/04/2022       Lab Results   Component Value Date    CRP 16.2 (H) 02/04/2022       Lab Results   Component Value Date    SEDRATE 33 (H) 02/04/2022       Lab Results   Component Value Date    RF <13.0 10/04/2021    SEDRATE 33 (H) 02/04/2022       No components found for: 25OHVITDTOT, 11BVGEYK6, 63HQHJHF2, METHODNOTE    No results found for: URICACID    No components found for: TSPOTTB        Imaging:  All imaging reviewed and independently interpreted by me.         ASSESSMENT / PLAN:     Jone Mathur is a 46 y.o. Black or  male with:      1. Systemic lupus erythematosus, unspecified SLE type, unspecified organ involvement status  - patient with Hx of SLE  - stable   - continue MTX 8 tabs weekly, daily HCQ 400mg daily   - sun screen advised  - warmth precautions  - update labs  - reassurance     2. Encounter for long-term (current) use of other medications  - annual EYE exam   - daily FA  - labs today     3. Other specified counseling  - over 10 minutes spent regarding below topics:  - Immunization counseling done.  - Weight loss counseling done.  - Nutrition and  exercise counseling.  - Limitation of alcohol consumption.  - Regular exercise:  Aerobic and resistance.  - Medication counseling provided.      Follow up in about 3 months (around 10/26/2022).    Method of contact with patient concerns: [facility]    Disclaimer:  This note is prepared using voice recognition software and as such is likely to have errors and has not been proof read. Please contact me for questions.     Time spent: 30 minutes in face to face discussion concerning diagnosis, prognosis, review of lab and test results, benefits of treatment as well as management of disease, counseling of patient and coordination of care between various health care providers.  Greater than half the time spent was used for coordination of care and counseling of patient.    Rich Hooper M.D.  Rheumatology Department   [facility]

## 2022-08-01 ENCOUNTER — LAB VISIT (OUTPATIENT)
Dept: LAB | Facility: HOSPITAL | Age: 47
End: 2022-08-01
Attending: INTERNAL MEDICINE

## 2022-08-01 DIAGNOSIS — M32.9 SYSTEMIC LUPUS ERYTHEMATOSUS, UNSPECIFIED SLE TYPE, UNSPECIFIED ORGAN INVOLVEMENT STATUS: ICD-10-CM

## 2022-08-01 PROCEDURE — 85025 COMPLETE CBC W/AUTO DIFF WBC: CPT | Performed by: INTERNAL MEDICINE

## 2022-08-01 PROCEDURE — 36415 COLL VENOUS BLD VENIPUNCTURE: CPT | Mod: PN | Performed by: INTERNAL MEDICINE

## 2022-08-01 PROCEDURE — 85652 RBC SED RATE AUTOMATED: CPT | Performed by: INTERNAL MEDICINE

## 2022-08-01 PROCEDURE — 80053 COMPREHEN METABOLIC PANEL: CPT | Performed by: INTERNAL MEDICINE

## 2022-08-01 PROCEDURE — 86140 C-REACTIVE PROTEIN: CPT | Performed by: INTERNAL MEDICINE

## 2022-08-02 LAB
ALBUMIN SERPL BCP-MCNC: 4.1 G/DL (ref 3.5–5.2)
ALP SERPL-CCNC: 76 U/L (ref 55–135)
ALT SERPL W/O P-5'-P-CCNC: 17 U/L (ref 10–44)
ANION GAP SERPL CALC-SCNC: 6 MMOL/L (ref 8–16)
AST SERPL-CCNC: 22 U/L (ref 10–40)
BASOPHILS # BLD AUTO: 0.01 K/UL (ref 0–0.2)
BASOPHILS NFR BLD: 0.2 % (ref 0–1.9)
BILIRUB SERPL-MCNC: 0.8 MG/DL (ref 0.1–1)
BUN SERPL-MCNC: 16 MG/DL (ref 6–20)
CALCIUM SERPL-MCNC: 9.5 MG/DL (ref 8.7–10.5)
CHLORIDE SERPL-SCNC: 106 MMOL/L (ref 95–110)
CO2 SERPL-SCNC: 28 MMOL/L (ref 23–29)
CREAT SERPL-MCNC: 1.1 MG/DL (ref 0.5–1.4)
CRP SERPL-MCNC: 4.5 MG/L (ref 0–8.2)
DIFFERENTIAL METHOD: ABNORMAL
EOSINOPHIL # BLD AUTO: 0.1 K/UL (ref 0–0.5)
EOSINOPHIL NFR BLD: 2.1 % (ref 0–8)
ERYTHROCYTE [DISTWIDTH] IN BLOOD BY AUTOMATED COUNT: 14.1 % (ref 11.5–14.5)
ERYTHROCYTE [SEDIMENTATION RATE] IN BLOOD BY PHOTOMETRIC METHOD: 22 MM/HR (ref 0–23)
EST. GFR  (NO RACE VARIABLE): >60 ML/MIN/1.73 M^2
GLUCOSE SERPL-MCNC: 88 MG/DL (ref 70–110)
HCT VFR BLD AUTO: 38.1 % (ref 40–54)
HGB BLD-MCNC: 12.2 G/DL (ref 14–18)
IMM GRANULOCYTES # BLD AUTO: 0.01 K/UL (ref 0–0.04)
IMM GRANULOCYTES NFR BLD AUTO: 0.2 % (ref 0–0.5)
LYMPHOCYTES # BLD AUTO: 1.4 K/UL (ref 1–4.8)
LYMPHOCYTES NFR BLD: 32.2 % (ref 18–48)
MCH RBC QN AUTO: 30.4 PG (ref 27–31)
MCHC RBC AUTO-ENTMCNC: 32 G/DL (ref 32–36)
MCV RBC AUTO: 95 FL (ref 82–98)
MONOCYTES # BLD AUTO: 0.5 K/UL (ref 0.3–1)
MONOCYTES NFR BLD: 12.4 % (ref 4–15)
NEUTROPHILS # BLD AUTO: 2.2 K/UL (ref 1.8–7.7)
NEUTROPHILS NFR BLD: 52.9 % (ref 38–73)
NRBC BLD-RTO: 0 /100 WBC
PLATELET # BLD AUTO: 180 K/UL (ref 150–450)
PMV BLD AUTO: 11.8 FL (ref 9.2–12.9)
POTASSIUM SERPL-SCNC: 4.2 MMOL/L (ref 3.5–5.1)
PROT SERPL-MCNC: 7.8 G/DL (ref 6–8.4)
RBC # BLD AUTO: 4.01 M/UL (ref 4.6–6.2)
SODIUM SERPL-SCNC: 140 MMOL/L (ref 136–145)
WBC # BLD AUTO: 4.19 K/UL (ref 3.9–12.7)

## 2022-09-12 ENCOUNTER — PATIENT MESSAGE (OUTPATIENT)
Dept: RHEUMATOLOGY | Facility: CLINIC | Age: 47
End: 2022-09-12

## 2022-09-13 RX ORDER — METHYLPREDNISOLONE 4 MG/1
TABLET ORAL
Qty: 1 EACH | Refills: 0 | Status: SHIPPED | OUTPATIENT
Start: 2022-09-13 | End: 2022-09-14 | Stop reason: SDUPTHER

## 2022-09-14 ENCOUNTER — LAB VISIT (OUTPATIENT)
Dept: LAB | Facility: HOSPITAL | Age: 47
End: 2022-09-14
Attending: INTERNAL MEDICINE

## 2022-09-14 ENCOUNTER — OFFICE VISIT (OUTPATIENT)
Dept: RHEUMATOLOGY | Facility: CLINIC | Age: 47
End: 2022-09-14

## 2022-09-14 VITALS
HEART RATE: 73 BPM | BODY MASS INDEX: 27.94 KG/M2 | OXYGEN SATURATION: 98 % | SYSTOLIC BLOOD PRESSURE: 130 MMHG | DIASTOLIC BLOOD PRESSURE: 86 MMHG | RESPIRATION RATE: 20 BRPM | WEIGHT: 195.13 LBS | HEIGHT: 70 IN

## 2022-09-14 DIAGNOSIS — M32.9 SYSTEMIC LUPUS ERYTHEMATOSUS, UNSPECIFIED SLE TYPE, UNSPECIFIED ORGAN INVOLVEMENT STATUS: Primary | ICD-10-CM

## 2022-09-14 DIAGNOSIS — Z79.899 ENCOUNTER FOR LONG-TERM (CURRENT) USE OF OTHER MEDICATIONS: ICD-10-CM

## 2022-09-14 DIAGNOSIS — M32.9 SYSTEMIC LUPUS ERYTHEMATOSUS, UNSPECIFIED SLE TYPE, UNSPECIFIED ORGAN INVOLVEMENT STATUS: ICD-10-CM

## 2022-09-14 DIAGNOSIS — Z71.89 COUNSELING AND COORDINATION OF CARE: ICD-10-CM

## 2022-09-14 LAB
ALBUMIN SERPL BCP-MCNC: 3.7 G/DL (ref 3.5–5.2)
ALP SERPL-CCNC: 95 U/L (ref 55–135)
ALT SERPL W/O P-5'-P-CCNC: 10 U/L (ref 10–44)
ANION GAP SERPL CALC-SCNC: 9 MMOL/L (ref 8–16)
AST SERPL-CCNC: 21 U/L (ref 10–40)
BASOPHILS # BLD AUTO: 0.03 K/UL (ref 0–0.2)
BASOPHILS NFR BLD: 0.8 % (ref 0–1.9)
BILIRUB SERPL-MCNC: 0.5 MG/DL (ref 0.1–1)
BUN SERPL-MCNC: 12 MG/DL (ref 6–20)
C3 SERPL-MCNC: 117 MG/DL (ref 50–180)
C4 SERPL-MCNC: 25 MG/DL (ref 11–44)
CALCIUM SERPL-MCNC: 9.4 MG/DL (ref 8.7–10.5)
CHLORIDE SERPL-SCNC: 106 MMOL/L (ref 95–110)
CK SERPL-CCNC: 173 U/L (ref 20–200)
CO2 SERPL-SCNC: 25 MMOL/L (ref 23–29)
CREAT SERPL-MCNC: 0.9 MG/DL (ref 0.5–1.4)
CRP SERPL-MCNC: 40.6 MG/L (ref 0–8.2)
DIFFERENTIAL METHOD: ABNORMAL
EOSINOPHIL # BLD AUTO: 0.1 K/UL (ref 0–0.5)
EOSINOPHIL NFR BLD: 3.2 % (ref 0–8)
ERYTHROCYTE [DISTWIDTH] IN BLOOD BY AUTOMATED COUNT: 13.4 % (ref 11.5–14.5)
ERYTHROCYTE [SEDIMENTATION RATE] IN BLOOD BY PHOTOMETRIC METHOD: 55 MM/HR (ref 0–23)
EST. GFR  (NO RACE VARIABLE): >60 ML/MIN/1.73 M^2
GLUCOSE SERPL-MCNC: 78 MG/DL (ref 70–110)
HBV SURFACE AB SER-ACNC: 33.67 MIU/ML
HBV SURFACE AB SER-ACNC: REACTIVE M[IU]/ML
HBV SURFACE AG SERPL QL IA: NORMAL
HCT VFR BLD AUTO: 36.5 % (ref 40–54)
HCV AB SERPL QL IA: NORMAL
HGB BLD-MCNC: 11.6 G/DL (ref 14–18)
HIV 1+2 AB+HIV1 P24 AG SERPL QL IA: NORMAL
IMM GRANULOCYTES # BLD AUTO: 0.01 K/UL (ref 0–0.04)
IMM GRANULOCYTES NFR BLD AUTO: 0.3 % (ref 0–0.5)
LYMPHOCYTES # BLD AUTO: 2 K/UL (ref 1–4.8)
LYMPHOCYTES NFR BLD: 51.5 % (ref 18–48)
MCH RBC QN AUTO: 29.2 PG (ref 27–31)
MCHC RBC AUTO-ENTMCNC: 31.8 G/DL (ref 32–36)
MCV RBC AUTO: 92 FL (ref 82–98)
MONOCYTES # BLD AUTO: 0.4 K/UL (ref 0.3–1)
MONOCYTES NFR BLD: 11.3 % (ref 4–15)
NEUTROPHILS # BLD AUTO: 1.3 K/UL (ref 1.8–7.7)
NEUTROPHILS NFR BLD: 32.9 % (ref 38–73)
NRBC BLD-RTO: 0 /100 WBC
PLATELET # BLD AUTO: 258 K/UL (ref 150–450)
PMV BLD AUTO: 10.9 FL (ref 9.2–12.9)
POTASSIUM SERPL-SCNC: 4.2 MMOL/L (ref 3.5–5.1)
PROT SERPL-MCNC: 7.7 G/DL (ref 6–8.4)
RBC # BLD AUTO: 3.97 M/UL (ref 4.6–6.2)
SODIUM SERPL-SCNC: 140 MMOL/L (ref 136–145)
TSH SERPL DL<=0.005 MIU/L-ACNC: 1.54 UIU/ML (ref 0.4–4)
URATE SERPL-MCNC: 4.4 MG/DL (ref 3.4–7)
WBC # BLD AUTO: 3.79 K/UL (ref 3.9–12.7)

## 2022-09-14 PROCEDURE — 86160 COMPLEMENT ANTIGEN: CPT | Performed by: INTERNAL MEDICINE

## 2022-09-14 PROCEDURE — 85025 COMPLETE CBC W/AUTO DIFF WBC: CPT | Performed by: INTERNAL MEDICINE

## 2022-09-14 PROCEDURE — 87389 HIV-1 AG W/HIV-1&-2 AB AG IA: CPT | Performed by: INTERNAL MEDICINE

## 2022-09-14 PROCEDURE — 99999 PR PBB SHADOW E&M-EST. PATIENT-LVL III: ICD-10-PCS | Mod: PBBFAC,,, | Performed by: INTERNAL MEDICINE

## 2022-09-14 PROCEDURE — 87340 HEPATITIS B SURFACE AG IA: CPT | Performed by: INTERNAL MEDICINE

## 2022-09-14 PROCEDURE — 84550 ASSAY OF BLOOD/URIC ACID: CPT | Performed by: INTERNAL MEDICINE

## 2022-09-14 PROCEDURE — 82550 ASSAY OF CK (CPK): CPT | Performed by: INTERNAL MEDICINE

## 2022-09-14 PROCEDURE — 99213 OFFICE O/P EST LOW 20 MIN: CPT | Mod: PBBFAC,PN | Performed by: INTERNAL MEDICINE

## 2022-09-14 PROCEDURE — 86225 DNA ANTIBODY NATIVE: CPT | Mod: 59 | Performed by: INTERNAL MEDICINE

## 2022-09-14 PROCEDURE — 86160 COMPLEMENT ANTIGEN: CPT | Mod: 59 | Performed by: INTERNAL MEDICINE

## 2022-09-14 PROCEDURE — 85652 RBC SED RATE AUTOMATED: CPT | Performed by: INTERNAL MEDICINE

## 2022-09-14 PROCEDURE — 99214 OFFICE O/P EST MOD 30 MIN: CPT | Mod: S$PBB,,, | Performed by: INTERNAL MEDICINE

## 2022-09-14 PROCEDURE — 86140 C-REACTIVE PROTEIN: CPT | Performed by: INTERNAL MEDICINE

## 2022-09-14 PROCEDURE — 36415 COLL VENOUS BLD VENIPUNCTURE: CPT | Performed by: INTERNAL MEDICINE

## 2022-09-14 PROCEDURE — 99999 PR PBB SHADOW E&M-EST. PATIENT-LVL III: CPT | Mod: PBBFAC,,, | Performed by: INTERNAL MEDICINE

## 2022-09-14 PROCEDURE — 86225 DNA ANTIBODY NATIVE: CPT | Performed by: INTERNAL MEDICINE

## 2022-09-14 PROCEDURE — 80053 COMPREHEN METABOLIC PANEL: CPT | Performed by: INTERNAL MEDICINE

## 2022-09-14 PROCEDURE — 84443 ASSAY THYROID STIM HORMONE: CPT | Performed by: INTERNAL MEDICINE

## 2022-09-14 PROCEDURE — 99214 PR OFFICE/OUTPT VISIT, EST, LEVL IV, 30-39 MIN: ICD-10-PCS | Mod: S$PBB,,, | Performed by: INTERNAL MEDICINE

## 2022-09-14 PROCEDURE — 86706 HEP B SURFACE ANTIBODY: CPT | Mod: 91 | Performed by: INTERNAL MEDICINE

## 2022-09-14 PROCEDURE — 86803 HEPATITIS C AB TEST: CPT | Performed by: INTERNAL MEDICINE

## 2022-09-14 RX ORDER — METHYLPREDNISOLONE 4 MG/1
TABLET ORAL
Qty: 1 EACH | Refills: 0 | Status: SHIPPED | OUTPATIENT
Start: 2022-09-14 | End: 2023-09-29

## 2022-09-14 RX ORDER — METHOTREXATE 2.5 MG/1
25 TABLET ORAL
Qty: 40 TABLET | Refills: 11 | Status: SHIPPED | OUTPATIENT
Start: 2022-09-14 | End: 2022-10-26 | Stop reason: SDUPTHER

## 2022-09-14 NOTE — PROGRESS NOTES
RHEUMATOLOGY OUTPATIENT CLINIC NOTE    9/14/2022    Attending Rheumatologist: Rich Hooper  Primary Care Provider: Harris Villalpando MD   Physician Requesting Consultation: No referring provider defined for this encounter.  Chief Complaint/Reason For Consultation:  No chief complaint on file.      Subjective:       PEDRO PABLO Mathur is a 47 y.o. Black or  male with medical history noted below presents for evaluation of lupus.  Patient notes Raynaud's for over 10 years.  Notes around 2007 seen swelling of the fingers and hands was seen by rheumatologists noted to have positive SARA, Grajeda, RNP, SSA and cryoglobulins.  Patient was last seen by Dr. Valenzuela October 19, 2021 at which point confirmed diagnosis of lupus and started on Plaquenil.  Patient notes since starting medications better control of pain and swelling but still has flare ups.  Endorses rash, Raynaud's.     Today  Patient here for follow up.   Last visit SLE management continued. He notes on 9/5, the onset of fever, fatigue, arthritis. He notes taking Alleve with little relief. Notes took his MTX on Sunday and this has helped slightly. Notes at work someone was sick otherwise no other sick contacts. Denies cough, SOB, runny nose or sore throat, rash, oral or nasal sores. He feels as when the first time he was diagnosed with SLE.     Review of Systems   Constitutional:  Negative for chills, fatigue, fever and unexpected weight change.   HENT:  Negative for mouth sores.    Eyes:  Negative for redness and eye dryness.   Respiratory:  Negative for cough and shortness of breath.    Cardiovascular:  Negative for chest pain.   Gastrointestinal:  Negative for abdominal distention, constipation, diarrhea, nausea, vomiting and reflux.   Musculoskeletal:  Negative for arthralgias, back pain, gait problem, joint swelling, leg pain, myalgias, neck pain, neck stiffness and joint deformity.   Integumentary:  Negative for rash.    Neurological:  Negative for weakness, numbness, headaches and memory loss.   Hematological:  Negative for adenopathy. Does not bruise/bleed easily.   Psychiatric/Behavioral:  Negative for confusion, decreased concentration, sleep disturbance and suicidal ideas. The patient is not nervous/anxious.    All other systems reviewed and are negative.     Chronic comorbid conditions affecting medical decision making today:  No past medical history on file.  Past Surgical History:   Procedure Laterality Date    SPINE SURGERY       Family History   Problem Relation Age of Onset    Cancer Father 70        Prostate cancer.    Hypertension Father      Social History     Substance and Sexual Activity   Alcohol Use None     Social History     Tobacco Use   Smoking Status Never   Smokeless Tobacco Never     Social History     Substance and Sexual Activity   Drug Use Not on file       Current Outpatient Medications:     acetaminophen (TYLENOL) 100 mg/mL suspension, Take by mouth every 4 (four) hours as needed for Temperature greater than., Disp: , Rfl:     ciprofloxacin HCl (CILOXAN) 0.3 % ophthalmic solution, Apply 3 drops to the right ear twice daily (Patient not taking: No sig reported), Disp: 10 mL, Rfl: 0    fluticasone propionate (FLONASE) 50 mcg/actuation nasal spray, Spray 2 sprays (100 mcg total) by Each Nare route once daily., Disp: 15.8 mL, Rfl: 12    folic acid (FOLVITE) 1 MG tablet, Take 1 tablet (1 mg total) by mouth once daily., Disp: 30 tablet, Rfl: 4    hydrOXYchloroQUINE (PLAQUENIL) 200 mg tablet, Take 2 tablets (400 mg total) by mouth once daily., Disp: 60 tablet, Rfl: 3    methotrexate 2.5 MG Tab, Take 10 tablets (25 mg total) by mouth every 7 days., Disp: 40 tablet, Rfl: 11    methylPREDNISolone (MEDROL DOSEPACK) 4 mg tablet, use as directed, Disp: 1 each, Rfl: 0    prednisoLONE acetate (PRED FORTE) 1 % DrpS, apply 3 drops to right ear twice daily for 10 days (Patient not taking: No sig reported), Disp: 10 mL,  Rfl: 0     Objective:         Vitals:    09/14/22 1503   BP: 130/86   Pulse: 73   Resp: 20     Physical Exam   Musculoskeletal:      Comments: Can make fist, no synovitis  Right 2nd PIP synovitis         Reviewed old and all outside pertinent medical records available.    All lab results personally reviewed and interpreted by me.  Lab Results   Component Value Date    WBC 4.19 08/01/2022    HGB 12.2 (L) 08/01/2022    HCT 38.1 (L) 08/01/2022    MCV 95 08/01/2022    MCH 30.4 08/01/2022    MCHC 32.0 08/01/2022    RDW 14.1 08/01/2022     08/01/2022    MPV 11.8 08/01/2022       Lab Results   Component Value Date     08/01/2022    K 4.2 08/01/2022     08/01/2022    CO2 28 08/01/2022    GLU 88 08/01/2022    BUN 16 08/01/2022    CALCIUM 9.5 08/01/2022    PROT 7.8 08/01/2022    ALBUMIN 4.1 08/01/2022    BILITOT 0.8 08/01/2022    AST 22 08/01/2022    ALKPHOS 76 08/01/2022    ALT 17 08/01/2022       Lab Results   Component Value Date    COLORU Yellow 02/04/2022    APPEARANCEUA Clear 02/04/2022    SPECGRAV 1.020 02/04/2022    PHUR 7.0 02/04/2022    PROTEINUA Trace (A) 02/04/2022    KETONESU Negative 02/04/2022    LEUKOCYTESUR Negative 02/04/2022    NITRITE Negative 02/04/2022    UROBILINOGEN Negative 02/04/2022       Lab Results   Component Value Date    CRP 4.5 08/01/2022       Lab Results   Component Value Date    SEDRATE 22 08/01/2022       Lab Results   Component Value Date    RF <13.0 10/04/2021    SEDRATE 22 08/01/2022       No components found for: 25OHVITDTOT, 14BMXQSD8, 49NSBIOY3, METHODNOTE    No results found for: URICACID    No components found for: TSPOTTB        Imaging:  All imaging reviewed and independently interpreted by me.         ASSESSMENT / PLAN:     Jone Kevan is a 47 y.o. Black or  male with:      1. Systemic lupus erythematosus, unspecified SLE type, unspecified organ involvement status  - patient with Hx of SLE  - possible flare vs Viral illness  - will update  labs  - given Medrol pack to help with joints   - increase MTX to 10 tabs weekly  - continue HCQ 400mg daily  - sun screen advised  - warmth precautions  - update labs  - reassurance     2. Encounter for long-term (current) use of other medications  - annual EYE exam   - daily FA  - labs today     3. Other specified counseling  - over 10 minutes spent regarding below topics:  - Immunization counseling done.  - Weight loss counseling done.  - Nutrition and exercise counseling.  - Limitation of alcohol consumption.  - Regular exercise:  Aerobic and resistance.  - Medication counseling provided.      Follow up in about 6 weeks (around 10/26/2022).    Method of contact with patient concerns: [facility]    Disclaimer:  This note is prepared using voice recognition software and as such is likely to have errors and has not been proof read. Please contact me for questions.     Time spent: 30 minutes in face to face discussion concerning diagnosis, prognosis, review of lab and test results, benefits of treatment as well as management of disease, counseling of patient and coordination of care between various health care providers.  Greater than half the time spent was used for coordination of care and counseling of patient.    Rich Hooper M.D.  Rheumatology Department   [facility]

## 2022-09-16 LAB
DNA TITER: NORMAL
DSDNA AB SER-ACNC: POSITIVE [IU]/ML

## 2022-09-19 ENCOUNTER — PATIENT MESSAGE (OUTPATIENT)
Dept: RHEUMATOLOGY | Facility: CLINIC | Age: 47
End: 2022-09-19

## 2022-09-19 DIAGNOSIS — M32.9 SYSTEMIC LUPUS ERYTHEMATOSUS, UNSPECIFIED SLE TYPE, UNSPECIFIED ORGAN INVOLVEMENT STATUS: ICD-10-CM

## 2022-09-19 DIAGNOSIS — Z22.7 TB LUNG, LATENT: Primary | ICD-10-CM

## 2022-09-20 RX ORDER — FOLIC ACID 1 MG/1
1 TABLET ORAL DAILY
Qty: 30 TABLET | Refills: 4 | Status: SHIPPED | OUTPATIENT
Start: 2022-09-20 | End: 2022-10-26 | Stop reason: SDUPTHER

## 2022-09-22 ENCOUNTER — HOSPITAL ENCOUNTER (OUTPATIENT)
Dept: RADIOLOGY | Facility: HOSPITAL | Age: 47
Discharge: HOME | End: 2022-09-22
Attending: INTERNAL MEDICINE

## 2022-09-22 DIAGNOSIS — Z22.7 TB LUNG, LATENT: ICD-10-CM

## 2022-09-22 PROCEDURE — 71046 X-RAY EXAM CHEST 2 VIEWS: CPT | Mod: TC

## 2022-09-22 PROCEDURE — 71046 X-RAY EXAM CHEST 2 VIEWS: CPT | Mod: 26,,, | Performed by: RADIOLOGY

## 2022-09-22 PROCEDURE — 71046 XR CHEST PA AND LATERAL: ICD-10-PCS | Mod: 26,,, | Performed by: RADIOLOGY

## 2022-09-22 RX ORDER — PREDNISONE 5 MG/1
5 TABLET ORAL DAILY
Qty: 30 TABLET | Refills: 3 | Status: SHIPPED | OUTPATIENT
Start: 2022-09-22 | End: 2022-10-26 | Stop reason: SDUPTHER

## 2022-09-23 ENCOUNTER — OFFICE VISIT (OUTPATIENT)
Dept: INTERNAL MEDICINE | Facility: CLINIC | Age: 47
End: 2022-09-23

## 2022-09-23 ENCOUNTER — OFFICE VISIT (OUTPATIENT)
Dept: INFECTIOUS DISEASES | Facility: CLINIC | Age: 47
End: 2022-09-23

## 2022-09-23 VITALS
WEIGHT: 194.44 LBS | TEMPERATURE: 99 F | BODY MASS INDEX: 27.9 KG/M2 | DIASTOLIC BLOOD PRESSURE: 76 MMHG | SYSTOLIC BLOOD PRESSURE: 129 MMHG | HEART RATE: 83 BPM

## 2022-09-23 VITALS
WEIGHT: 193 LBS | DIASTOLIC BLOOD PRESSURE: 71 MMHG | OXYGEN SATURATION: 98 % | TEMPERATURE: 99 F | HEIGHT: 70 IN | SYSTOLIC BLOOD PRESSURE: 128 MMHG | HEART RATE: 82 BPM | BODY MASS INDEX: 27.63 KG/M2 | RESPIRATION RATE: 20 BRPM

## 2022-09-23 DIAGNOSIS — Z12.11 COLON CANCER SCREENING: ICD-10-CM

## 2022-09-23 DIAGNOSIS — R76.12 (QFT) QUANTIFERON-TB TEST REACTION WITHOUT ACTIVE TUBERCULOSIS: Primary | ICD-10-CM

## 2022-09-23 DIAGNOSIS — Z23 NEED FOR INFLUENZA VACCINATION: ICD-10-CM

## 2022-09-23 DIAGNOSIS — Z22.7 LATENT TUBERCULOSIS BY BLOOD TEST: ICD-10-CM

## 2022-09-23 DIAGNOSIS — Z00.00 HEALTHCARE MAINTENANCE: ICD-10-CM

## 2022-09-23 DIAGNOSIS — Z23 NEED FOR DIPHTHERIA, TETANUS, PERTUSSIS, AND HIB VACCINATION: ICD-10-CM

## 2022-09-23 DIAGNOSIS — M32.8 OTHER FORMS OF SYSTEMIC LUPUS ERYTHEMATOSUS, UNSPECIFIED ORGAN INVOLVEMENT STATUS: Primary | Chronic | ICD-10-CM

## 2022-09-23 DIAGNOSIS — Z22.7 TB LUNG, LATENT: ICD-10-CM

## 2022-09-23 PROCEDURE — 99212 OFFICE O/P EST SF 10 MIN: CPT | Mod: S$PBB,,, | Performed by: STUDENT IN AN ORGANIZED HEALTH CARE EDUCATION/TRAINING PROGRAM

## 2022-09-23 PROCEDURE — 99212 PR OFFICE/OUTPT VISIT, EST, LEVL II, 10-19 MIN: ICD-10-PCS | Mod: S$PBB,,, | Performed by: STUDENT IN AN ORGANIZED HEALTH CARE EDUCATION/TRAINING PROGRAM

## 2022-09-23 PROCEDURE — 99203 PR OFFICE/OUTPT VISIT, NEW, LEVL III, 30-44 MIN: ICD-10-PCS | Mod: S$PBB,,, | Performed by: PHYSICIAN ASSISTANT

## 2022-09-23 PROCEDURE — 99999 PR PBB SHADOW E&M-EST. PATIENT-LVL III: ICD-10-PCS | Mod: PBBFAC,,, | Performed by: PHYSICIAN ASSISTANT

## 2022-09-23 PROCEDURE — 99999 PR PBB SHADOW E&M-EST. PATIENT-LVL III: CPT | Mod: PBBFAC,,, | Performed by: PHYSICIAN ASSISTANT

## 2022-09-23 PROCEDURE — 99213 OFFICE O/P EST LOW 20 MIN: CPT | Mod: PBBFAC,27 | Performed by: PHYSICIAN ASSISTANT

## 2022-09-23 PROCEDURE — 99213 OFFICE O/P EST LOW 20 MIN: CPT | Mod: PBBFAC | Performed by: STUDENT IN AN ORGANIZED HEALTH CARE EDUCATION/TRAINING PROGRAM

## 2022-09-23 PROCEDURE — 99999 PR PBB SHADOW E&M-EST. PATIENT-LVL III: ICD-10-PCS | Mod: PBBFAC,,, | Performed by: STUDENT IN AN ORGANIZED HEALTH CARE EDUCATION/TRAINING PROGRAM

## 2022-09-23 PROCEDURE — 99203 OFFICE O/P NEW LOW 30 MIN: CPT | Mod: S$PBB,,, | Performed by: PHYSICIAN ASSISTANT

## 2022-09-23 PROCEDURE — 99999 PR PBB SHADOW E&M-EST. PATIENT-LVL III: CPT | Mod: PBBFAC,,, | Performed by: STUDENT IN AN ORGANIZED HEALTH CARE EDUCATION/TRAINING PROGRAM

## 2022-09-23 RX ORDER — RIFAMPIN 300 MG/1
300 CAPSULE ORAL 2 TIMES DAILY
Qty: 60 CAPSULE | Refills: 3 | Status: SHIPPED | OUTPATIENT
Start: 2022-09-23 | End: 2023-09-29

## 2022-09-23 NOTE — ASSESSMENT & PLAN NOTE
Managed by ID  Started on Rifampin for 6 months  Highly recommended good adherence to treatment.  Will follow up.

## 2022-09-23 NOTE — PROGRESS NOTES
Subjective:       Patient ID: Jone Mathur is a 47 y.o. male.    Chief Complaint: No chief complaint on file.    HPI    Patient is a 47 y.o. male , English speaking, with a history of:  SLE  Latent TB (positive quantiferon)    Patient comes to the clinic for a follow up.    Patient is currently asymptomatic and has no complaints.  Patient denies CV symptoms, CP, SOB, palpitations.  Patient denies constitutional symptoms, fever, changes in his urine or his stool.    He is being managed by Rheumatology and ID.  Last seen by both specialists this week.  On MTX, HCQ and prescribed with Rifampin (hasn't started yet)  XR Chest 9/22/22: negative for active disease      Healthcare Maintenance:  Colonoscopy: not done  Vaccinations: Pneumonia, Zoster, Tetanus (not done)  COVID vaccination: completed  Depression screening: PHQ2 score = 0    ROS  11-point review of systems done. Negative except for detailed in the HPI.        Objective:      Physical Exam  Vitals and nursing note reviewed.   Constitutional:       Appearance: Normal appearance.   HENT:      Head: Normocephalic and atraumatic.      Right Ear: Tympanic membrane normal.      Left Ear: Tympanic membrane normal.      Nose: Nose normal.      Mouth/Throat:      Mouth: Mucous membranes are moist.      Pharynx: Oropharynx is clear.   Eyes:      Extraocular Movements: Extraocular movements intact.      Conjunctiva/sclera: Conjunctivae normal.      Pupils: Pupils are equal, round, and reactive to light.   Cardiovascular:      Rate and Rhythm: Normal rate and regular rhythm.      Pulses: Normal pulses.      Heart sounds: Normal heart sounds.   Pulmonary:      Effort: Pulmonary effort is normal.      Breath sounds: Normal breath sounds.   Abdominal:      General: Bowel sounds are normal. There is no distension.      Palpations: Abdomen is soft.      Tenderness: There is no abdominal tenderness.   Musculoskeletal:         General: Normal range of motion.      Cervical  back: Normal range of motion and neck supple.   Skin:     General: Skin is warm.   Neurological:      General: No focal deficit present.      Mental Status: He is alert and oriented to person, place, and time. Mental status is at baseline.   Psychiatric:         Mood and Affect: Mood normal.          Assessment:       Problem List Items Addressed This Visit          ID    Latent tuberculosis by blood test     Managed by ID  Started on Rifampin for 6 months  Highly recommended good adherence to treatment.  Will follow up.            Immunology/Multi System    Other forms of systemic lupus erythematosus - Primary (Chronic)     Other Visit Diagnoses       Healthcare maintenance        Relevant Orders    Pneumococcal Polysaccharide Vaccine (23 Valent) (SQ/IM)    Lipid panel    Colon cancer screening        Relevant Orders    Case Request Endoscopy: COLONOSCOPY (Completed)    Need for influenza vaccination        Relevant Orders    Influenza - Quadrivalent (PF)    Need for diphtheria, tetanus, pertussis, and Hib vaccination        Relevant Orders    Td Vaccine (Adult) - Preservative Free              Plan:         - Continue same management as per Rheumatology and ID  - Emphasis on initiating Rifampin treatment as prescribed.  - Colonoscopy ordered  - Lipid panel CMP ordered for next visit  - Vaccinations recommended and ordered.        Education provided  Lifestyle recommendations given  AVS printed, explained, and given to the patient.  RTC in : 3 months      LEONARDO FIGUEROA MD, MPH  Internal Medicine  [facility]  [facility]

## 2022-10-03 PROBLEM — R76.12 (QFT) QUANTIFERON-TB TEST REACTION WITHOUT ACTIVE TUBERCULOSIS: Status: ACTIVE | Noted: 2022-10-03

## 2022-10-03 NOTE — PROGRESS NOTES
Subjective:      Patient ID: Jone Mathur is a 47 y.o. male.    Chief Complaint:Follow-up (Positive quant gold)      History of Present Illness    Mr. Mathur is referred for positive quantiferon gold.  He is originally from [place] but emigrated to [place] then the  in 1993 and has resided in  since then.  He denies any known TB exposure.  He also has lupus and has been on multiple immunomodulating meds.  The patient denies any recent fever, chills, sweats, cough, weight loss, or hemoptysis though he does reports night sweats.      Review of Systems   Constitutional: Positive for night sweats. Negative for chills, decreased appetite, fever, malaise/fatigue, weight gain and weight loss.   HENT:  Negative for congestion, ear pain, hearing loss, hoarse voice, sore throat and tinnitus.    Eyes:  Negative for blurred vision, redness and visual disturbance.   Cardiovascular:  Negative for chest pain, leg swelling and palpitations.   Respiratory:  Negative for cough, hemoptysis, shortness of breath, sputum production and wheezing.    Endocrine: Negative for cold intolerance and heat intolerance.   Hematologic/Lymphatic: Negative for adenopathy. Does not bruise/bleed easily.   Skin:  Negative for dry skin, itching, rash and suspicious lesions.   Musculoskeletal:  Positive for back pain and joint pain. Negative for myalgias and neck pain.   Gastrointestinal:  Positive for heartburn. Negative for abdominal pain, constipation, diarrhea, nausea and vomiting.   Genitourinary:  Negative for dysuria, flank pain, frequency, hematuria, hesitancy and urgency.   Neurological:  Positive for headaches. Negative for dizziness, numbness, paresthesias and weakness.   Psychiatric/Behavioral:  Negative for depression and memory loss. The patient does not have insomnia and is not nervous/anxious.    Allergic/Immunologic: Negative for environmental allergies, HIV exposure, hives and persistent infections.   Objective:    Physical Exam  Constitutional:       Appearance: Normal appearance. He is well-developed. He is not diaphoretic.   HENT:      Head: Normocephalic and atraumatic.      Mouth/Throat:      Mouth: No lacerations or oral lesions.      Dentition: Normal dentition. Does not have dentures. No dental caries or dental abscesses.      Pharynx: Uvula midline.   Eyes:      General: Lids are normal. No scleral icterus.     Conjunctiva/sclera: Conjunctivae normal.      Pupils: Pupils are equal, round, and reactive to light.   Cardiovascular:      Rate and Rhythm: Normal rate and regular rhythm.      Heart sounds: No murmur heard.    No friction rub. No gallop.   Pulmonary:      Effort: Pulmonary effort is normal. No respiratory distress.      Breath sounds: Normal breath sounds. No decreased breath sounds, wheezing, rhonchi or rales.   Abdominal:      General: Bowel sounds are normal. There is no distension.      Palpations: Abdomen is soft. There is no mass.      Tenderness: There is no abdominal tenderness. There is no guarding or rebound.   Musculoskeletal:      Cervical back: Neck supple.   Lymphadenopathy:      Head:      Right side of head: No submental, submandibular, tonsillar, preauricular, posterior auricular or occipital adenopathy.      Left side of head: No submental, submandibular, tonsillar, preauricular, posterior auricular or occipital adenopathy.      Cervical: No cervical adenopathy.      Right cervical: No superficial, deep or posterior cervical adenopathy.     Left cervical: No superficial, deep or posterior cervical adenopathy.      Upper Body:      Right upper body: No supraclavicular, axillary, pectoral or epitrochlear adenopathy.      Left upper body: No supraclavicular, axillary, pectoral or epitrochlear adenopathy.      Lower Body: No right inguinal adenopathy. No left inguinal adenopathy.   Skin:     General: Skin is warm and dry.      Coloration: Skin is not pale.      Findings: No erythema, lesion  or rash.   Neurological:      Mental Status: He is alert and oriented to person, place, and time.      Cranial Nerves: No cranial nerve deficit.   Psychiatric:         Behavior: Behavior normal.       Imaging:  X-Ray Chest PA And Lateral  Order: [id number]  Status: Final result     Visible to patient: Yes (seen)     Next appt: 10/26/2022 at 04:40 PM in Rheumatology (Rich Hooper MD)     Dx: TB lung, latent     0 Result Notes  Details    Reading Physician Reading Date Result Priority   Nicolas Aguirre Jr., MD  [phone] 9/22/2022 Routine     Narrative & Impression  EXAMINATION:  XR CHEST PA AND LATERAL     CLINICAL HISTORY:  suspected autoimmune disease; Latent tuberculosis     TECHNIQUE:  PA and lateral views of the chest were performed.     COMPARISON:  None     FINDINGS:  Elevation left hemidiaphragm.The lungs are clear, with normal appearance of pulmonary vasculature and no pleural effusion or pneumothorax.     The cardiac silhouette is normal in size. The hilar and mediastinal contours are unremarkable.     Bones are intact.     Impression:     No acute abnormality.        Electronically signed by: Nicolas Aguirre  Date:                                            09/22/2022  Time:                                           18:14        Latest Reference Range & Units 09/14/22 16:28   Sodium 136 - 145 mmol/L 140   Potassium 3.5 - 5.1 mmol/L 4.2   Chloride 95 - 110 mmol/L 106   CO2 23 - 29 mmol/L 25   Anion Gap 8 - 16 mmol/L 9   BUN 6 - 20 mg/dL 12   Creatinine 0.5 - 1.4 mg/dL 0.9   eGFR >60 mL/min/1.73 m^2 >60.0   Glucose 70 - 110 mg/dL 78   Calcium 8.7 - 10.5 mg/dL 9.4   Alkaline Phosphatase 55 - 135 U/L 95   PROTEIN TOTAL 6.0 - 8.4 g/dL 7.7   Albumin 3.5 - 5.2 g/dL 3.7   Uric Acid 3.4 - 7.0 mg/dL 4.4   BILIRUBIN TOTAL 0.1 - 1.0 mg/dL 0.5   AST 10 - 40 U/L 21   ALT 10 - 44 U/L 10   CRP 0.0 - 8.2 mg/L 40.6 (H)   (H): Data is abnormally high      Assessment:       1. (QFT) QuantiFERON-TB test reaction  without active tuberculosis    2. TB lung, latent          Hx of lupus.  Possible passive exposure given from [place].  CXR without findings. Latent TB suspected  Plan:   Rifampin 300 mg po bid x 4 months  Monthly CMP  FU 1 month   The patient was encouraged to call the office for further concerns or complaints.

## 2022-10-07 ENCOUNTER — PATIENT MESSAGE (OUTPATIENT)
Dept: INFECTIOUS DISEASES | Facility: CLINIC | Age: 47
End: 2022-10-07
Payer: COMMERCIAL

## 2022-10-21 ENCOUNTER — LAB VISIT (OUTPATIENT)
Dept: LAB | Facility: HOSPITAL | Age: 47
End: 2022-10-21
Attending: INTERNAL MEDICINE
Payer: COMMERCIAL

## 2022-10-21 DIAGNOSIS — R76.12 (QFT) QUANTIFERON-TB TEST REACTION WITHOUT ACTIVE TUBERCULOSIS: ICD-10-CM

## 2022-10-21 DIAGNOSIS — Z00.00 HEALTHCARE MAINTENANCE: ICD-10-CM

## 2022-10-21 DIAGNOSIS — Z22.7 LATENT TUBERCULOSIS BY BLOOD TEST: ICD-10-CM

## 2022-10-21 LAB
ALBUMIN SERPL BCP-MCNC: 3.8 G/DL (ref 3.5–5.2)
ALBUMIN SERPL BCP-MCNC: 3.8 G/DL (ref 3.5–5.2)
ALP SERPL-CCNC: 94 U/L (ref 55–135)
ALP SERPL-CCNC: 94 U/L (ref 55–135)
ALT SERPL W/O P-5'-P-CCNC: 12 U/L (ref 10–44)
ALT SERPL W/O P-5'-P-CCNC: 12 U/L (ref 10–44)
ANION GAP SERPL CALC-SCNC: 6 MMOL/L (ref 8–16)
ANION GAP SERPL CALC-SCNC: 6 MMOL/L (ref 8–16)
AST SERPL-CCNC: 24 U/L (ref 10–40)
AST SERPL-CCNC: 24 U/L (ref 10–40)
BILIRUB SERPL-MCNC: 0.6 MG/DL (ref 0.1–1)
BILIRUB SERPL-MCNC: 0.6 MG/DL (ref 0.1–1)
BUN SERPL-MCNC: 9 MG/DL (ref 6–20)
BUN SERPL-MCNC: 9 MG/DL (ref 6–20)
CALCIUM SERPL-MCNC: 8.9 MG/DL (ref 8.7–10.5)
CALCIUM SERPL-MCNC: 8.9 MG/DL (ref 8.7–10.5)
CHLORIDE SERPL-SCNC: 106 MMOL/L (ref 95–110)
CHLORIDE SERPL-SCNC: 106 MMOL/L (ref 95–110)
CHOLEST SERPL-MCNC: 208 MG/DL (ref 120–199)
CHOLEST/HDLC SERPL: 4 {RATIO} (ref 2–5)
CO2 SERPL-SCNC: 27 MMOL/L (ref 23–29)
CO2 SERPL-SCNC: 27 MMOL/L (ref 23–29)
CREAT SERPL-MCNC: 0.9 MG/DL (ref 0.5–1.4)
CREAT SERPL-MCNC: 0.9 MG/DL (ref 0.5–1.4)
EST. GFR  (NO RACE VARIABLE): >60 ML/MIN/1.73 M^2
EST. GFR  (NO RACE VARIABLE): >60 ML/MIN/1.73 M^2
GLUCOSE SERPL-MCNC: 92 MG/DL (ref 70–110)
GLUCOSE SERPL-MCNC: 92 MG/DL (ref 70–110)
HDLC SERPL-MCNC: 52 MG/DL (ref 40–75)
HDLC SERPL: 25 % (ref 20–50)
LDLC SERPL CALC-MCNC: 140.6 MG/DL (ref 63–159)
NONHDLC SERPL-MCNC: 156 MG/DL
POTASSIUM SERPL-SCNC: 4.3 MMOL/L (ref 3.5–5.1)
POTASSIUM SERPL-SCNC: 4.3 MMOL/L (ref 3.5–5.1)
PROT SERPL-MCNC: 6.9 G/DL (ref 6–8.4)
PROT SERPL-MCNC: 6.9 G/DL (ref 6–8.4)
SODIUM SERPL-SCNC: 139 MMOL/L (ref 136–145)
SODIUM SERPL-SCNC: 139 MMOL/L (ref 136–145)
TRIGL SERPL-MCNC: 77 MG/DL (ref 30–150)

## 2022-10-21 PROCEDURE — 80061 LIPID PANEL: CPT | Performed by: STUDENT IN AN ORGANIZED HEALTH CARE EDUCATION/TRAINING PROGRAM

## 2022-10-21 PROCEDURE — 36415 COLL VENOUS BLD VENIPUNCTURE: CPT | Mod: PN | Performed by: STUDENT IN AN ORGANIZED HEALTH CARE EDUCATION/TRAINING PROGRAM

## 2022-10-21 PROCEDURE — 80053 COMPREHEN METABOLIC PANEL: CPT | Performed by: STUDENT IN AN ORGANIZED HEALTH CARE EDUCATION/TRAINING PROGRAM

## 2022-10-22 DIAGNOSIS — Z12.11 COLON CANCER SCREENING: Primary | ICD-10-CM

## 2022-10-24 ENCOUNTER — PATIENT MESSAGE (OUTPATIENT)
Dept: INTERNAL MEDICINE | Facility: CLINIC | Age: 47
End: 2022-10-24
Payer: COMMERCIAL

## 2022-10-26 ENCOUNTER — OFFICE VISIT (OUTPATIENT)
Dept: RHEUMATOLOGY | Facility: CLINIC | Age: 47
End: 2022-10-26
Payer: COMMERCIAL

## 2022-10-26 DIAGNOSIS — D84.821 IMMUNODEFICIENCY DUE TO TREATMENT WITH IMMUNOSUPPRESSIVE MEDICATION: ICD-10-CM

## 2022-10-26 DIAGNOSIS — Z71.89 COUNSELING AND COORDINATION OF CARE: ICD-10-CM

## 2022-10-26 DIAGNOSIS — Z22.7 TB LUNG, LATENT: ICD-10-CM

## 2022-10-26 DIAGNOSIS — M32.9 SYSTEMIC LUPUS ERYTHEMATOSUS, UNSPECIFIED SLE TYPE, UNSPECIFIED ORGAN INVOLVEMENT STATUS: Primary | ICD-10-CM

## 2022-10-26 DIAGNOSIS — Z79.899 IMMUNODEFICIENCY DUE TO TREATMENT WITH IMMUNOSUPPRESSIVE MEDICATION: ICD-10-CM

## 2022-10-26 DIAGNOSIS — Z79.52 LONG TERM SYSTEMIC STEROID USER: ICD-10-CM

## 2022-10-26 PROCEDURE — 99214 OFFICE O/P EST MOD 30 MIN: CPT | Mod: 95,,, | Performed by: INTERNAL MEDICINE

## 2022-10-26 PROCEDURE — 99214 PR OFFICE/OUTPT VISIT, EST, LEVL IV, 30-39 MIN: ICD-10-PCS | Mod: 95,,, | Performed by: INTERNAL MEDICINE

## 2022-10-26 RX ORDER — PREDNISONE 5 MG/1
5 TABLET ORAL DAILY
Qty: 30 TABLET | Refills: 3 | Status: SHIPPED | OUTPATIENT
Start: 2022-10-26 | End: 2023-01-26 | Stop reason: SDUPTHER

## 2022-10-26 RX ORDER — FOLIC ACID 1 MG/1
1 TABLET ORAL DAILY
Qty: 30 TABLET | Refills: 4 | Status: SHIPPED | OUTPATIENT
Start: 2022-10-26 | End: 2023-01-26 | Stop reason: SDUPTHER

## 2022-10-26 RX ORDER — HYDROXYCHLOROQUINE SULFATE 200 MG/1
400 TABLET, FILM COATED ORAL DAILY
Qty: 60 TABLET | Refills: 3 | Status: SHIPPED | OUTPATIENT
Start: 2022-10-26 | End: 2023-01-26 | Stop reason: SDUPTHER

## 2022-10-26 RX ORDER — METHOTREXATE 2.5 MG/1
25 TABLET ORAL
Qty: 40 TABLET | Refills: 11 | Status: SHIPPED | OUTPATIENT
Start: 2022-10-26 | End: 2023-01-26 | Stop reason: SDUPTHER

## 2022-10-26 NOTE — PROGRESS NOTES
The patient location is: Home  The chief complaint leading to consultation is: Follow up  Visit type: Virtual visit with synchronous audio and video  Total time spent with patient: 15 minutes     Each patient to whom he or she provides medical services by telemedicine is:  (1) informed of the relationship between the physician and patient and the respective role of any other health care provider with respect to management of the patient; and (2) notified that he or she may decline to receive medical services by telemedicine and may withdraw from such care at any time.         RHEUMATOLOGY OUTPATIENT CLINIC NOTE    10/26/2022    Attending Rheumatologist: Rich Hooper  Primary Care Provider: Love Tijerina MD   Physician Requesting Consultation: No referring provider defined for this encounter.  Chief Complaint/Reason For Consultation:  No chief complaint on file.      Subjective:       PEDRO PABLO Mathur is a 47 y.o. Black or  male with medical history noted below presents for evaluation of lupus.  Patient notes Raynaud's for over 10 years.  Notes around 2007 seen swelling of the fingers and hands was seen by rheumatologists noted to have positive SARA, Grajeda, RNP, SSA and cryoglobulins.  Patient was last seen by Dr. Valenzuela October 19, 2021 at which point confirmed diagnosis of lupus and started on Plaquenil.  Patient notes since starting medications better control of pain and swelling but still has flare ups.  Endorses rash, Raynaud's.     Today  Patient here for follow up.   Last visit was noted to have fever, fatigue and arthralgias. Work up obtained and started on Steroids. Blood work showed +SLE labs, and +TB. Seen by ID and started on Rifampin. On PDN with improvement of symptoms. Tolerating meds.    Review of Systems   Constitutional:  Negative for chills, fatigue, fever and unexpected weight change.   HENT:  Negative for mouth sores and trouble swallowing.    Eyes:  Negative for  redness and eye dryness.   Respiratory:  Negative for cough and shortness of breath.    Cardiovascular:  Negative for chest pain.   Gastrointestinal:  Negative for abdominal distention, constipation, diarrhea, nausea, vomiting and reflux.   Genitourinary:  Negative for genital sores.   Musculoskeletal:  Negative for arthralgias, back pain, gait problem, joint swelling, leg pain, myalgias, neck pain, neck stiffness and joint deformity.   Integumentary:  Negative for rash.   Neurological:  Negative for weakness, numbness, headaches and memory loss.   Hematological:  Negative for adenopathy. Does not bruise/bleed easily.   Psychiatric/Behavioral:  Negative for confusion, decreased concentration, sleep disturbance and suicidal ideas. The patient is not nervous/anxious.    All other systems reviewed and are negative.     Chronic comorbid conditions affecting medical decision making today:  No past medical history on file.  Past Surgical History:   Procedure Laterality Date    SPINE SURGERY       Family History   Problem Relation Age of Onset    Cancer Father 70        Prostate cancer.    Hypertension Father      Social History     Substance and Sexual Activity   Alcohol Use None     Social History     Tobacco Use   Smoking Status Never   Smokeless Tobacco Never     Social History     Substance and Sexual Activity   Drug Use Not on file       Current Outpatient Medications:     acetaminophen (TYLENOL) 100 mg/mL suspension, Take by mouth every 4 (four) hours as needed for Temperature greater than., Disp: , Rfl:     ciprofloxacin HCl (CILOXAN) 0.3 % ophthalmic solution, Apply 3 drops to the right ear twice daily (Patient not taking: No sig reported), Disp: 10 mL, Rfl: 0    fluticasone propionate (FLONASE) 50 mcg/actuation nasal spray, Spray 2 sprays (100 mcg total) by Each Nare route once daily., Disp: 15.8 mL, Rfl: 12    folic acid (FOLVITE) 1 MG tablet, Take 1 tablet (1 mg total) by mouth once daily., Disp: 30 tablet,  Rfl: 4    hydrOXYchloroQUINE (PLAQUENIL) 200 mg tablet, Take 2 tablets (400 mg total) by mouth once daily., Disp: 60 tablet, Rfl: 3    methotrexate 2.5 MG Tab, Take 10 tablets (25 mg total) by mouth every 7 days., Disp: 40 tablet, Rfl: 11    methylPREDNISolone (MEDROL DOSEPACK) 4 mg tablet, use as directed, Disp: 1 each, Rfl: 0    prednisoLONE acetate (PRED FORTE) 1 % DrpS, apply 3 drops to right ear twice daily for 10 days (Patient not taking: No sig reported), Disp: 10 mL, Rfl: 0    predniSONE (DELTASONE) 5 MG tablet, Take 1 tablet (5 mg total) by mouth once daily., Disp: 30 tablet, Rfl: 3    rifAMpin (RIFADIN) 300 MG capsule, Take 1 capsule (300 mg total) by mouth 2 (two) times a day., Disp: 60 capsule, Rfl: 3     Objective:         There were no vitals filed for this visit.    Physical Exam  Virtual Visit   Reviewed old and all outside pertinent medical records available.    All lab results personally reviewed and interpreted by me.  Lab Results   Component Value Date    WBC 3.79 (L) 09/14/2022    HGB 11.6 (L) 09/14/2022    HCT 36.5 (L) 09/14/2022    MCV 92 09/14/2022    MCH 29.2 09/14/2022    MCHC 31.8 (L) 09/14/2022    RDW 13.4 09/14/2022     09/14/2022    MPV 10.9 09/14/2022       Lab Results   Component Value Date     10/21/2022     10/21/2022    K 4.3 10/21/2022    K 4.3 10/21/2022     10/21/2022     10/21/2022    CO2 27 10/21/2022    CO2 27 10/21/2022    GLU 92 10/21/2022    GLU 92 10/21/2022    BUN 9 10/21/2022    BUN 9 10/21/2022    CALCIUM 8.9 10/21/2022    CALCIUM 8.9 10/21/2022    PROT 6.9 10/21/2022    PROT 6.9 10/21/2022    ALBUMIN 3.8 10/21/2022    ALBUMIN 3.8 10/21/2022    BILITOT 0.6 10/21/2022    BILITOT 0.6 10/21/2022    AST 24 10/21/2022    AST 24 10/21/2022    ALKPHOS 94 10/21/2022    ALKPHOS 94 10/21/2022    ALT 12 10/21/2022    ALT 12 10/21/2022       Lab Results   Component Value Date    COLORU Yellow 02/04/2022    APPEARANCEUA Clear 02/04/2022    SPECGRAV  1.020 02/04/2022    PHUR 7.0 02/04/2022    PROTEINUA Trace (A) 02/04/2022    KETONESU Negative 02/04/2022    LEUKOCYTESUR Negative 02/04/2022    NITRITE Negative 02/04/2022    UROBILINOGEN Negative 02/04/2022       Lab Results   Component Value Date    CRP 40.6 (H) 09/14/2022       Lab Results   Component Value Date    SEDRATE 55 (H) 09/14/2022       Lab Results   Component Value Date    RF <13.0 10/04/2021    SEDRATE 55 (H) 09/14/2022       No components found for: 25OHVITDTOT, 94XNCEGO2, 49VCQPLR0, METHODNOTE    Lab Results   Component Value Date    URICACID 4.4 09/14/2022       No components found for: TSPOTTB        Imaging:  All imaging reviewed and independently interpreted by me.         ASSESSMENT / PLAN:     Jone Mathur is a 47 y.o. Black or  male with:      1. Systemic lupus erythematosus, unspecified SLE type, unspecified organ involvement status  - patient with SLE  - last visit noted to have active SLE complaints, labs +dsDNA, normal complements, worsening CBC, elevated ESR/CRP  - given steroids with improvement, but also noted +TB, seen by ID and started on Rifampin for Latent TB  - right now has improved, discussed awaiting completing TB treatment and then redoing labs and if still active then will consider Benlysta   - continue MTX 10 tabs, HCQ 400mg daily   - sun screen advised  - warmth precautions  - labs prior to next visit   - reassurance     2. Encounter for long-term (current) use of other medications  - annual EYE exam   - daily FA  - monitor labs  - vaccines per guidelines    3. Steroid Use  -I discussed the potential adverse effects of long term PDN use (I.e. osteoporosis, increase risk of infection, skin fragility, weight gain, blood sugar elevation and avascular necrosis). I encourage patient to take Calcium (1200mg daily) and Vit D (800 units daily) while on PDN.     4. Latent TB  - on Rifampin     5. Other specified counseling  - over 10 minutes spent regarding  below topics:  - Immunization counseling done.  - Weight loss counseling done.  - Nutrition and exercise counseling.  - Limitation of alcohol consumption.  - Regular exercise:  Aerobic and resistance.  - Medication counseling provided.      Follow up in about 3 months (around 1/26/2023).    Method of contact with patient concerns: [facility]    Disclaimer:  This note is prepared using voice recognition software and as such is likely to have errors and has not been proof read. Please contact me for questions.     Time spent: 30 minutes in face to face discussion concerning diagnosis, prognosis, review of lab and test results, benefits of treatment as well as management of disease, counseling of patient and coordination of care between various health care providers.  Greater than half the time spent was used for coordination of care and counseling of patient.    Rich Hooper M.D.  Rheumatology Department   [facility]

## 2022-11-15 ENCOUNTER — LAB VISIT (OUTPATIENT)
Dept: LAB | Facility: HOSPITAL | Age: 47
End: 2022-11-15
Attending: STUDENT IN AN ORGANIZED HEALTH CARE EDUCATION/TRAINING PROGRAM
Payer: COMMERCIAL

## 2022-11-15 DIAGNOSIS — R76.12 (QFT) QUANTIFERON-TB TEST REACTION WITHOUT ACTIVE TUBERCULOSIS: ICD-10-CM

## 2022-11-15 LAB
ALBUMIN SERPL BCP-MCNC: 3.8 G/DL (ref 3.5–5.2)
ALP SERPL-CCNC: 93 U/L (ref 55–135)
ALT SERPL W/O P-5'-P-CCNC: 25 U/L (ref 10–44)
ANION GAP SERPL CALC-SCNC: 7 MMOL/L (ref 8–16)
AST SERPL-CCNC: 33 U/L (ref 10–40)
BILIRUB SERPL-MCNC: 0.3 MG/DL (ref 0.1–1)
BUN SERPL-MCNC: 13 MG/DL (ref 6–20)
CALCIUM SERPL-MCNC: 8.8 MG/DL (ref 8.7–10.5)
CHLORIDE SERPL-SCNC: 103 MMOL/L (ref 95–110)
CO2 SERPL-SCNC: 27 MMOL/L (ref 23–29)
CREAT SERPL-MCNC: 1.2 MG/DL (ref 0.5–1.4)
EST. GFR  (NO RACE VARIABLE): >60 ML/MIN/1.73 M^2
GLUCOSE SERPL-MCNC: 92 MG/DL (ref 70–110)
POTASSIUM SERPL-SCNC: 3.9 MMOL/L (ref 3.5–5.1)
PROT SERPL-MCNC: 7.1 G/DL (ref 6–8.4)
SODIUM SERPL-SCNC: 137 MMOL/L (ref 136–145)

## 2022-11-15 PROCEDURE — 36415 COLL VENOUS BLD VENIPUNCTURE: CPT | Mod: PN | Performed by: PHYSICIAN ASSISTANT

## 2022-11-15 PROCEDURE — 80053 COMPREHEN METABOLIC PANEL: CPT | Performed by: PHYSICIAN ASSISTANT

## 2022-12-20 ENCOUNTER — LAB VISIT (OUTPATIENT)
Dept: LAB | Facility: HOSPITAL | Age: 47
End: 2022-12-20
Attending: STUDENT IN AN ORGANIZED HEALTH CARE EDUCATION/TRAINING PROGRAM
Payer: COMMERCIAL

## 2022-12-20 DIAGNOSIS — R76.12 (QFT) QUANTIFERON-TB TEST REACTION WITHOUT ACTIVE TUBERCULOSIS: ICD-10-CM

## 2022-12-20 PROCEDURE — 80053 COMPREHEN METABOLIC PANEL: CPT | Performed by: PHYSICIAN ASSISTANT

## 2022-12-20 PROCEDURE — 36415 COLL VENOUS BLD VENIPUNCTURE: CPT | Mod: PN | Performed by: PHYSICIAN ASSISTANT

## 2022-12-21 LAB
ALBUMIN SERPL BCP-MCNC: 3.9 G/DL (ref 3.5–5.2)
ALP SERPL-CCNC: 92 U/L (ref 55–135)
ALT SERPL W/O P-5'-P-CCNC: 49 U/L (ref 10–44)
ANION GAP SERPL CALC-SCNC: 7 MMOL/L (ref 8–16)
AST SERPL-CCNC: 44 U/L (ref 10–40)
BILIRUB SERPL-MCNC: 0.3 MG/DL (ref 0.1–1)
BUN SERPL-MCNC: 17 MG/DL (ref 6–20)
CALCIUM SERPL-MCNC: 9.2 MG/DL (ref 8.7–10.5)
CHLORIDE SERPL-SCNC: 106 MMOL/L (ref 95–110)
CO2 SERPL-SCNC: 27 MMOL/L (ref 23–29)
CREAT SERPL-MCNC: 1.2 MG/DL (ref 0.5–1.4)
EST. GFR  (NO RACE VARIABLE): >60 ML/MIN/1.73 M^2
GLUCOSE SERPL-MCNC: 70 MG/DL (ref 70–110)
POTASSIUM SERPL-SCNC: 3.8 MMOL/L (ref 3.5–5.1)
PROT SERPL-MCNC: 7.3 G/DL (ref 6–8.4)
SODIUM SERPL-SCNC: 140 MMOL/L (ref 136–145)

## 2023-01-11 ENCOUNTER — LAB VISIT (OUTPATIENT)
Dept: LAB | Facility: HOSPITAL | Age: 48
End: 2023-01-11
Attending: PHYSICIAN ASSISTANT
Payer: COMMERCIAL

## 2023-01-11 DIAGNOSIS — R76.12 (QFT) QUANTIFERON-TB TEST REACTION WITHOUT ACTIVE TUBERCULOSIS: ICD-10-CM

## 2023-01-11 PROCEDURE — 36415 COLL VENOUS BLD VENIPUNCTURE: CPT | Mod: PN | Performed by: PHYSICIAN ASSISTANT

## 2023-01-11 PROCEDURE — 80053 COMPREHEN METABOLIC PANEL: CPT | Performed by: PHYSICIAN ASSISTANT

## 2023-01-12 LAB
ALBUMIN SERPL BCP-MCNC: 3.7 G/DL (ref 3.5–5.2)
ALP SERPL-CCNC: 94 U/L (ref 55–135)
ALT SERPL W/O P-5'-P-CCNC: 33 U/L (ref 10–44)
ANION GAP SERPL CALC-SCNC: 6 MMOL/L (ref 8–16)
AST SERPL-CCNC: 27 U/L (ref 10–40)
BILIRUB SERPL-MCNC: 0.4 MG/DL (ref 0.1–1)
BUN SERPL-MCNC: 12 MG/DL (ref 6–20)
CALCIUM SERPL-MCNC: 8.5 MG/DL (ref 8.7–10.5)
CHLORIDE SERPL-SCNC: 106 MMOL/L (ref 95–110)
CO2 SERPL-SCNC: 25 MMOL/L (ref 23–29)
CREAT SERPL-MCNC: 1 MG/DL (ref 0.5–1.4)
EST. GFR  (NO RACE VARIABLE): >60 ML/MIN/1.73 M^2
GLUCOSE SERPL-MCNC: 76 MG/DL (ref 70–110)
POTASSIUM SERPL-SCNC: 3.7 MMOL/L (ref 3.5–5.1)
PROT SERPL-MCNC: 6.9 G/DL (ref 6–8.4)
SODIUM SERPL-SCNC: 137 MMOL/L (ref 136–145)

## 2023-01-26 ENCOUNTER — OFFICE VISIT (OUTPATIENT)
Dept: RHEUMATOLOGY | Facility: CLINIC | Age: 48
End: 2023-01-26
Payer: COMMERCIAL

## 2023-01-26 ENCOUNTER — PATIENT MESSAGE (OUTPATIENT)
Dept: INFECTIOUS DISEASES | Facility: CLINIC | Age: 48
End: 2023-01-26
Payer: COMMERCIAL

## 2023-01-26 VITALS
SYSTOLIC BLOOD PRESSURE: 127 MMHG | DIASTOLIC BLOOD PRESSURE: 83 MMHG | RESPIRATION RATE: 20 BRPM | BODY MASS INDEX: 28.62 KG/M2 | HEIGHT: 70 IN | HEART RATE: 68 BPM | OXYGEN SATURATION: 98 % | WEIGHT: 199.94 LBS

## 2023-01-26 DIAGNOSIS — Z22.7 LATENT TUBERCULOSIS BY BLOOD TEST: ICD-10-CM

## 2023-01-26 DIAGNOSIS — D84.821 IMMUNODEFICIENCY DUE TO TREATMENT WITH IMMUNOSUPPRESSIVE MEDICATION: ICD-10-CM

## 2023-01-26 DIAGNOSIS — Z79.899 IMMUNODEFICIENCY DUE TO TREATMENT WITH IMMUNOSUPPRESSIVE MEDICATION: ICD-10-CM

## 2023-01-26 DIAGNOSIS — M32.9 SYSTEMIC LUPUS ERYTHEMATOSUS, UNSPECIFIED SLE TYPE, UNSPECIFIED ORGAN INVOLVEMENT STATUS: Primary | ICD-10-CM

## 2023-01-26 DIAGNOSIS — Z79.52 LONG TERM SYSTEMIC STEROID USER: ICD-10-CM

## 2023-01-26 DIAGNOSIS — Z71.89 COUNSELING AND COORDINATION OF CARE: ICD-10-CM

## 2023-01-26 PROCEDURE — 99999 PR PBB SHADOW E&M-EST. PATIENT-LVL III: CPT | Mod: PBBFAC,,, | Performed by: INTERNAL MEDICINE

## 2023-01-26 PROCEDURE — 1159F MED LIST DOCD IN RCRD: CPT | Mod: CPTII,S$GLB,, | Performed by: INTERNAL MEDICINE

## 2023-01-26 PROCEDURE — 3008F BODY MASS INDEX DOCD: CPT | Mod: CPTII,S$GLB,, | Performed by: INTERNAL MEDICINE

## 2023-01-26 PROCEDURE — 3074F SYST BP LT 130 MM HG: CPT | Mod: CPTII,S$GLB,, | Performed by: INTERNAL MEDICINE

## 2023-01-26 PROCEDURE — 99999 PR PBB SHADOW E&M-EST. PATIENT-LVL III: ICD-10-PCS | Mod: PBBFAC,,, | Performed by: INTERNAL MEDICINE

## 2023-01-26 PROCEDURE — 3079F DIAST BP 80-89 MM HG: CPT | Mod: CPTII,S$GLB,, | Performed by: INTERNAL MEDICINE

## 2023-01-26 PROCEDURE — 3008F PR BODY MASS INDEX (BMI) DOCUMENTED: ICD-10-PCS | Mod: CPTII,S$GLB,, | Performed by: INTERNAL MEDICINE

## 2023-01-26 PROCEDURE — 3079F PR MOST RECENT DIASTOLIC BLOOD PRESSURE 80-89 MM HG: ICD-10-PCS | Mod: CPTII,S$GLB,, | Performed by: INTERNAL MEDICINE

## 2023-01-26 PROCEDURE — 3074F PR MOST RECENT SYSTOLIC BLOOD PRESSURE < 130 MM HG: ICD-10-PCS | Mod: CPTII,S$GLB,, | Performed by: INTERNAL MEDICINE

## 2023-01-26 PROCEDURE — 99214 PR OFFICE/OUTPT VISIT, EST, LEVL IV, 30-39 MIN: ICD-10-PCS | Mod: S$GLB,,, | Performed by: INTERNAL MEDICINE

## 2023-01-26 PROCEDURE — 99214 OFFICE O/P EST MOD 30 MIN: CPT | Mod: S$GLB,,, | Performed by: INTERNAL MEDICINE

## 2023-01-26 PROCEDURE — 1159F PR MEDICATION LIST DOCUMENTED IN MEDICAL RECORD: ICD-10-PCS | Mod: CPTII,S$GLB,, | Performed by: INTERNAL MEDICINE

## 2023-01-26 RX ORDER — METHOTREXATE 2.5 MG/1
25 TABLET ORAL
Qty: 40 TABLET | Refills: 11 | Status: SHIPPED | OUTPATIENT
Start: 2023-01-26 | End: 2023-04-26 | Stop reason: SDUPTHER

## 2023-01-26 RX ORDER — HYDROXYCHLOROQUINE SULFATE 200 MG/1
400 TABLET, FILM COATED ORAL DAILY
Qty: 60 TABLET | Refills: 3 | Status: SHIPPED | OUTPATIENT
Start: 2023-01-26 | End: 2023-04-26 | Stop reason: SDUPTHER

## 2023-01-26 RX ORDER — FOLIC ACID 1 MG/1
1 TABLET ORAL DAILY
Qty: 30 TABLET | Refills: 4 | Status: SHIPPED | OUTPATIENT
Start: 2023-01-26 | End: 2023-04-26 | Stop reason: SDUPTHER

## 2023-01-26 RX ORDER — PREDNISONE 5 MG/1
5 TABLET ORAL DAILY
Qty: 30 TABLET | Refills: 3 | Status: SHIPPED | OUTPATIENT
Start: 2023-01-26 | End: 2023-04-26 | Stop reason: SDUPTHER

## 2023-01-26 NOTE — PROGRESS NOTES
RHEUMATOLOGY OUTPATIENT CLINIC NOTE    1/26/2023    Attending Rheumatologist: Rich Hooper  Primary Care Provider: Love Tijerina MD   Physician Requesting Consultation: No referring provider defined for this encounter.  Chief Complaint/Reason For Consultation:  No chief complaint on file.        Subjective:       PEDRO PABLO Mathur is a 47 y.o. Black or  male with medical history noted below presents for evaluation of lupus.  Patient notes Raynaud's for over 10 years.  Notes around 2007 seen swelling of the fingers and hands was seen by rheumatologists noted to have positive SARA, Grajeda, RNP, SSA and cryoglobulins.  Patient was last seen by Dr. Valenzuela October 19, 2021 at which point confirmed diagnosis of lupus and started on Plaquenil.  Patient notes since starting medications better control of pain and swelling but still has flare ups.  Endorses rash, Raynaud's.     Today  Patient here for follow up.   Last visit SLE management continued. He is on TB therapy now, notes he has been doing relatively well. Notes these last couple of days some increased joint pain due to cold weather, Tylenol helps. Notes last month slight elevation in LFTs but improved, no change in therapy. Tolerating meds.     Review of Systems   Constitutional:  Negative for chills, fatigue, fever and unexpected weight change.   HENT:  Negative for mouth sores and trouble swallowing.    Eyes:  Negative for redness and eye dryness.   Respiratory:  Negative for cough and shortness of breath.    Cardiovascular:  Negative for chest pain.   Gastrointestinal:  Negative for abdominal distention, constipation, diarrhea, nausea, vomiting and reflux.   Genitourinary:  Negative for genital sores.   Musculoskeletal:  Negative for arthralgias, back pain, gait problem, joint swelling, leg pain, myalgias, neck pain, neck stiffness and joint deformity.   Integumentary:  Negative for rash.   Neurological:  Negative for weakness,  numbness, headaches and memory loss.   Hematological:  Negative for adenopathy. Does not bruise/bleed easily.   Psychiatric/Behavioral:  Negative for confusion, decreased concentration, sleep disturbance and suicidal ideas. The patient is not nervous/anxious.    All other systems reviewed and are negative.     Chronic comorbid conditions affecting medical decision making today:  No past medical history on file.  Past Surgical History:   Procedure Laterality Date    SPINE SURGERY       Family History   Problem Relation Age of Onset    Cancer Father 70        Prostate cancer.    Hypertension Father      Social History     Substance and Sexual Activity   Alcohol Use None     Social History     Tobacco Use   Smoking Status Never   Smokeless Tobacco Never     Social History     Substance and Sexual Activity   Drug Use Not on file       Current Outpatient Medications:     acetaminophen (TYLENOL) 100 mg/mL suspension, Take by mouth every 4 (four) hours as needed for Temperature greater than., Disp: , Rfl:     methylPREDNISolone (MEDROL DOSEPACK) 4 mg tablet, use as directed, Disp: 1 each, Rfl: 0    prednisoLONE acetate (PRED FORTE) 1 % DrpS, apply 3 drops to right ear twice daily for 10 days, Disp: 10 mL, Rfl: 0    rifAMpin (RIFADIN) 300 MG capsule, Take 1 capsule (300 mg total) by mouth 2 (two) times a day., Disp: 60 capsule, Rfl: 3    ciprofloxacin HCl (CILOXAN) 0.3 % ophthalmic solution, Apply 3 drops to the right ear twice daily (Patient not taking: No sig reported), Disp: 10 mL, Rfl: 0    fluticasone propionate (FLONASE) 50 mcg/actuation nasal spray, Spray 2 sprays (100 mcg total) by Each Nare route once daily., Disp: 15.8 mL, Rfl: 12    folic acid (FOLVITE) 1 MG tablet, Take 1 tablet (1 mg total) by mouth once daily., Disp: 30 tablet, Rfl: 4    hydrOXYchloroQUINE (PLAQUENIL) 200 mg tablet, Take 2 tablets (400 mg total) by mouth once daily., Disp: 60 tablet, Rfl: 3    methotrexate 2.5 MG Tab, Take 10 tablets (25 mg  total) by mouth every 7 days., Disp: 40 tablet, Rfl: 11    predniSONE (DELTASONE) 5 MG tablet, Take 1 tablet (5 mg total) by mouth once daily., Disp: 30 tablet, Rfl: 3     Objective:         Vitals:    01/26/23 1618   BP: 127/83   Pulse: 68   Resp: 20       Physical Exam  Can make fist, no synovitis  Knee crepitus  Negative ankle/MTP    Reviewed old and all outside pertinent medical records available.    All lab results personally reviewed and interpreted by me.  Lab Results   Component Value Date    WBC 3.79 (L) 09/14/2022    HGB 11.6 (L) 09/14/2022    HCT 36.5 (L) 09/14/2022    MCV 92 09/14/2022    MCH 29.2 09/14/2022    MCHC 31.8 (L) 09/14/2022    RDW 13.4 09/14/2022     09/14/2022    MPV 10.9 09/14/2022       Lab Results   Component Value Date     01/11/2023    K 3.7 01/11/2023     01/11/2023    CO2 25 01/11/2023    GLU 76 01/11/2023    BUN 12 01/11/2023    CALCIUM 8.5 (L) 01/11/2023    PROT 6.9 01/11/2023    ALBUMIN 3.7 01/11/2023    BILITOT 0.4 01/11/2023    AST 27 01/11/2023    ALKPHOS 94 01/11/2023    ALT 33 01/11/2023       Lab Results   Component Value Date    COLORU Yellow 02/04/2022    APPEARANCEUA Clear 02/04/2022    SPECGRAV 1.020 02/04/2022    PHUR 7.0 02/04/2022    PROTEINUA Trace (A) 02/04/2022    KETONESU Negative 02/04/2022    LEUKOCYTESUR Negative 02/04/2022    NITRITE Negative 02/04/2022    UROBILINOGEN Negative 02/04/2022       Lab Results   Component Value Date    CRP 40.6 (H) 09/14/2022       Lab Results   Component Value Date    SEDRATE 55 (H) 09/14/2022       Lab Results   Component Value Date    RF <13.0 10/04/2021    SEDRATE 55 (H) 09/14/2022       No components found for: 25OHVITDTOT, 42VZGYNA5, 31ZRVCHN6, METHODNOTE    Lab Results   Component Value Date    URICACID 4.4 09/14/2022       No components found for: TSPOTTB        Imaging:  All imaging reviewed and independently interpreted by me.         ASSESSMENT / PLAN:     Jone Kevan is a 47 y.o. Black or   American male with:      1. Systemic lupus erythematosus, unspecified SLE type, unspecified organ involvement status  - patient with SLE  - stable  - continue current regimen: MTX 25mg weekly, HCQ 400mg, PDN 5mg   - sun screen advised  - warmth precautions  - update labs   - reassurance     2. Encounter for long-term (current) use of other medications  - annual EYE exam, referral given    - daily FA  - monitor labs  - vaccines per guidelines    3. Steroid Use  -I discussed the potential adverse effects of long term PDN use (I.e. osteoporosis, increase risk of infection, skin fragility, weight gain, blood sugar elevation and avascular necrosis). I encourage patient to take Calcium (1200mg daily) and Vit D (800 units daily) while on PDN.     4. Latent TB  - on Rifampin   - monitor LFT, can consider reduction in MTX if LFT worsen again     5. Other specified counseling  - over 10 minutes spent regarding below topics:  - Immunization counseling done.  - Weight loss counseling done.  - Nutrition and exercise counseling.  - Limitation of alcohol consumption.  - Regular exercise:  Aerobic and resistance.  - Medication counseling provided.      Follow up in about 3 months (around 4/26/2023).    [facility]    Disclaimer:  This note is prepared using voice recognition software and as such is likely to have errors and has not been proof read. Please contact me for questions.     Time spent: 30 minutes in face to face discussion concerning diagnosis, prognosis, review of lab and test results, benefits of treatment as well as management of disease, counseling of patient and coordination of care between various health care providers.  Greater than half the time spent was used for coordination of care and counseling of patient.    Rich Hooper M.D.  Rheumatology Department   [facility]

## 2023-01-30 DIAGNOSIS — R76.12 (QFT) QUANTIFERON-TB TEST REACTION WITHOUT ACTIVE TUBERCULOSIS: ICD-10-CM

## 2023-01-31 ENCOUNTER — PATIENT MESSAGE (OUTPATIENT)
Dept: INTERNAL MEDICINE | Facility: CLINIC | Age: 48
End: 2023-01-31
Payer: COMMERCIAL

## 2023-01-31 RX ORDER — RIFAMPIN 300 MG/1
300 CAPSULE ORAL 2 TIMES DAILY
Qty: 60 CAPSULE | Refills: 3 | OUTPATIENT
Start: 2023-01-31

## 2023-02-17 ENCOUNTER — LAB VISIT (OUTPATIENT)
Dept: LAB | Facility: HOSPITAL | Age: 48
End: 2023-02-17
Attending: INTERNAL MEDICINE
Payer: COMMERCIAL

## 2023-02-17 DIAGNOSIS — M32.9 SYSTEMIC LUPUS ERYTHEMATOSUS, UNSPECIFIED SLE TYPE, UNSPECIFIED ORGAN INVOLVEMENT STATUS: ICD-10-CM

## 2023-02-17 LAB
BILIRUB UR QL STRIP: NEGATIVE
CLARITY UR REFRACT.AUTO: CLEAR
COLOR UR AUTO: YELLOW
CREAT UR-MCNC: 140 MG/DL (ref 23–375)
GLUCOSE UR QL STRIP: NEGATIVE
HGB UR QL STRIP: NEGATIVE
KETONES UR QL STRIP: NEGATIVE
LEUKOCYTE ESTERASE UR QL STRIP: NEGATIVE
NITRITE UR QL STRIP: NEGATIVE
PH UR STRIP: 6 [PH] (ref 5–8)
PROT UR QL STRIP: ABNORMAL
PROT UR-MCNC: <7 MG/DL (ref 0–15)
PROT/CREAT UR: NORMAL MG/G{CREAT} (ref 0–0.2)
SP GR UR STRIP: 1.02 (ref 1–1.03)
URN SPEC COLLECT METH UR: ABNORMAL

## 2023-02-17 PROCEDURE — 81003 URINALYSIS AUTO W/O SCOPE: CPT | Performed by: INTERNAL MEDICINE

## 2023-02-17 PROCEDURE — 84156 ASSAY OF PROTEIN URINE: CPT | Performed by: INTERNAL MEDICINE

## 2023-03-06 ENCOUNTER — PATIENT MESSAGE (OUTPATIENT)
Dept: RHEUMATOLOGY | Facility: CLINIC | Age: 48
End: 2023-03-06
Payer: COMMERCIAL

## 2023-03-06 RX ORDER — ERGOCALCIFEROL 1.25 MG/1
50000 CAPSULE ORAL
Qty: 12 CAPSULE | Refills: 0 | Status: SHIPPED | OUTPATIENT
Start: 2023-03-06 | End: 2023-04-26 | Stop reason: SDUPTHER

## 2023-03-08 ENCOUNTER — PATIENT MESSAGE (OUTPATIENT)
Dept: RHEUMATOLOGY | Facility: CLINIC | Age: 48
End: 2023-03-08
Payer: COMMERCIAL

## 2023-03-17 ENCOUNTER — OFFICE VISIT (OUTPATIENT)
Dept: OPHTHALMOLOGY | Facility: CLINIC | Age: 48
End: 2023-03-17
Payer: COMMERCIAL

## 2023-03-17 DIAGNOSIS — M32.9 SYSTEMIC LUPUS ERYTHEMATOSUS, UNSPECIFIED SLE TYPE, UNSPECIFIED ORGAN INVOLVEMENT STATUS: ICD-10-CM

## 2023-03-17 DIAGNOSIS — H35.52 RETINITIS PIGMENTOSA OF BOTH EYES: ICD-10-CM

## 2023-03-17 DIAGNOSIS — Z79.899 EYE EXAM DUE TO HIGH RISK MEDICATION, ENCOUNTER FOR: Primary | ICD-10-CM

## 2023-03-17 PROCEDURE — 1159F MED LIST DOCD IN RCRD: CPT | Mod: CPTII,S$GLB,, | Performed by: STUDENT IN AN ORGANIZED HEALTH CARE EDUCATION/TRAINING PROGRAM

## 2023-03-17 PROCEDURE — 92134 POSTERIOR SEGMENT OCT RETINA (OCULAR COHERENCE TOMOGRAPHY)-BOTH EYES: ICD-10-PCS | Mod: S$GLB,,, | Performed by: STUDENT IN AN ORGANIZED HEALTH CARE EDUCATION/TRAINING PROGRAM

## 2023-03-17 PROCEDURE — 1160F RVW MEDS BY RX/DR IN RCRD: CPT | Mod: CPTII,S$GLB,, | Performed by: STUDENT IN AN ORGANIZED HEALTH CARE EDUCATION/TRAINING PROGRAM

## 2023-03-17 PROCEDURE — 92134 CPTRZ OPH DX IMG PST SGM RTA: CPT | Mod: S$GLB,,, | Performed by: STUDENT IN AN ORGANIZED HEALTH CARE EDUCATION/TRAINING PROGRAM

## 2023-03-17 PROCEDURE — 1159F PR MEDICATION LIST DOCUMENTED IN MEDICAL RECORD: ICD-10-PCS | Mod: CPTII,S$GLB,, | Performed by: STUDENT IN AN ORGANIZED HEALTH CARE EDUCATION/TRAINING PROGRAM

## 2023-03-17 PROCEDURE — 1160F PR REVIEW ALL MEDS BY PRESCRIBER/CLIN PHARMACIST DOCUMENTED: ICD-10-PCS | Mod: CPTII,S$GLB,, | Performed by: STUDENT IN AN ORGANIZED HEALTH CARE EDUCATION/TRAINING PROGRAM

## 2023-03-17 PROCEDURE — 99999 PR PBB SHADOW E&M-EST. PATIENT-LVL III: CPT | Mod: PBBFAC,,, | Performed by: STUDENT IN AN ORGANIZED HEALTH CARE EDUCATION/TRAINING PROGRAM

## 2023-03-17 PROCEDURE — 99999 PR PBB SHADOW E&M-EST. PATIENT-LVL III: ICD-10-PCS | Mod: PBBFAC,,, | Performed by: STUDENT IN AN ORGANIZED HEALTH CARE EDUCATION/TRAINING PROGRAM

## 2023-03-17 PROCEDURE — 99204 OFFICE O/P NEW MOD 45 MIN: CPT | Mod: S$GLB,,, | Performed by: STUDENT IN AN ORGANIZED HEALTH CARE EDUCATION/TRAINING PROGRAM

## 2023-03-17 PROCEDURE — 99204 PR OFFICE/OUTPT VISIT, NEW, LEVL IV, 45-59 MIN: ICD-10-PCS | Mod: S$GLB,,, | Performed by: STUDENT IN AN ORGANIZED HEALTH CARE EDUCATION/TRAINING PROGRAM

## 2023-03-17 NOTE — PROGRESS NOTES
HPI     Concerns About Ocular Health     Additional comments: Patient states he is using a medication to help   treat lupus and he is noticing some side effects effecting his vision.   Patient states his vision seems to be becoming blurry. Patient denies any   pain or irritation.          Last edited by Jacqueline Sparks on 3/17/2023  2:15 PM.            Assessment /Plan     For exam results, see Encounter Report.    Eye exam due to high risk medication, encounter for  Systemic lupus erythematosus, unspecified SLE type, unspecified organ involvement status  -  No maculopathy present on exam today, MOCT WNL    PVD, Right eye- No tears or breaks were seen after careful retinal evaluation.   RT/RD precautions    Discussed retinal detachment signs and symptoms including flashes of lights, floaters, perceived curtains or veils. Advised to patient to monitor visual status including increase in flashes and floaters, or the development of visual field changes including curtain and /or veils. Advised patient to RTC urgently if these symptoms occur. Explained the need for follow up exams to the patient even if there are no changes in the symptoms.    Retinitis Pigmentosa, both eyes- Long term per pt. Report has had IVFA done in the past      [facility] [facility] GO[facility]

## 2023-04-26 ENCOUNTER — OFFICE VISIT (OUTPATIENT)
Dept: RHEUMATOLOGY | Facility: CLINIC | Age: 48
End: 2023-04-26
Payer: COMMERCIAL

## 2023-04-26 DIAGNOSIS — Z79.899 IMMUNODEFICIENCY DUE TO TREATMENT WITH IMMUNOSUPPRESSIVE MEDICATION: ICD-10-CM

## 2023-04-26 DIAGNOSIS — D84.821 IMMUNODEFICIENCY DUE TO TREATMENT WITH IMMUNOSUPPRESSIVE MEDICATION: ICD-10-CM

## 2023-04-26 DIAGNOSIS — Z79.52 LONG TERM SYSTEMIC STEROID USER: ICD-10-CM

## 2023-04-26 DIAGNOSIS — Z71.89 COUNSELING AND COORDINATION OF CARE: ICD-10-CM

## 2023-04-26 DIAGNOSIS — M32.9 SYSTEMIC LUPUS ERYTHEMATOSUS, UNSPECIFIED SLE TYPE, UNSPECIFIED ORGAN INVOLVEMENT STATUS: Primary | ICD-10-CM

## 2023-04-26 PROCEDURE — 1159F PR MEDICATION LIST DOCUMENTED IN MEDICAL RECORD: ICD-10-PCS | Mod: CPTII,95,, | Performed by: INTERNAL MEDICINE

## 2023-04-26 PROCEDURE — 1159F MED LIST DOCD IN RCRD: CPT | Mod: CPTII,95,, | Performed by: INTERNAL MEDICINE

## 2023-04-26 PROCEDURE — 99214 PR OFFICE/OUTPT VISIT, EST, LEVL IV, 30-39 MIN: ICD-10-PCS | Mod: 95,,, | Performed by: INTERNAL MEDICINE

## 2023-04-26 PROCEDURE — 99214 OFFICE O/P EST MOD 30 MIN: CPT | Mod: 95,,, | Performed by: INTERNAL MEDICINE

## 2023-04-26 RX ORDER — FOLIC ACID 1 MG/1
1 TABLET ORAL DAILY
Qty: 90 TABLET | Refills: 3 | Status: SHIPPED | OUTPATIENT
Start: 2023-04-26 | End: 2023-07-31 | Stop reason: SDUPTHER

## 2023-04-26 RX ORDER — ERGOCALCIFEROL 1.25 MG/1
50000 CAPSULE ORAL
Qty: 12 CAPSULE | Refills: 0 | Status: SHIPPED | OUTPATIENT
Start: 2023-04-26 | End: 2023-10-04 | Stop reason: SDUPTHER

## 2023-04-26 RX ORDER — HYDROXYCHLOROQUINE SULFATE 200 MG/1
400 TABLET, FILM COATED ORAL DAILY
Qty: 180 TABLET | Refills: 3 | Status: SHIPPED | OUTPATIENT
Start: 2023-04-26 | End: 2023-07-31 | Stop reason: SDUPTHER

## 2023-04-26 RX ORDER — METHOTREXATE 2.5 MG/1
25 TABLET ORAL
Qty: 120 TABLET | Refills: 3 | Status: SHIPPED | OUTPATIENT
Start: 2023-04-26 | End: 2023-07-31 | Stop reason: SDUPTHER

## 2023-04-26 RX ORDER — PREDNISONE 5 MG/1
5 TABLET ORAL DAILY
Qty: 90 TABLET | Refills: 3 | Status: SHIPPED | OUTPATIENT
Start: 2023-04-26 | End: 2023-09-29

## 2023-04-26 NOTE — PROGRESS NOTES
The patient location is: Home  The chief complaint leading to consultation is: SLE Follow up  Visit type: Virtual visit with synchronous audio and video  Total time spent with patient: 15 minutes     Each patient to whom he or she provides medical services by telemedicine is:  (1) informed of the relationship between the physician and patient and the respective role of any other health care provider with respect to management of the patient; and (2) notified that he or she may decline to receive medical services by telemedicine and may withdraw from such care at any time.           RHEUMATOLOGY OUTPATIENT CLINIC NOTE    4/26/2023    Attending Rheumatologist: Rich Hooper  Primary Care Provider: Love Tijerina MD   Physician Requesting Consultation: No referring provider defined for this encounter.  Chief Complaint/Reason For Consultation:  Lupus        Subjective:       HPI  Jone Mathur is a 47 y.o. Black or  male with medical history noted below presents for evaluation of lupus.  Patient notes Raynaud's for over 10 years.  Notes around 2007 seen swelling of the fingers and hands was seen by rheumatologists noted to have positive SARA, Grajeda, RNP, SSA and cryoglobulins.  Patient was last seen by Dr. Valenzuela October 19, 2021 at which point confirmed diagnosis of lupus and started on Plaquenil.  Patient notes since starting medications better control of pain and swelling but still has flare ups.  Endorses rash, Raynaud's.     Today  Patient here for follow up.   Last visit SLE management continued. He reports he has completed TB therapy. Notes he has been doing well, no Lupus flare ups. Would like to see if we can taper some medication, otherwise stable, Tolerating meds.       He is on TB therapy now, notes he has been doing relatively well. Notes these last couple of days some increased joint pain due to cold weather, Tylenol helps. Notes last month slight elevation in LFTs but improved, no  change in therapy. Tolerating meds.     Review of Systems   Constitutional:  Negative for chills, fatigue, fever and unexpected weight change.   HENT:  Negative for mouth sores and trouble swallowing.    Eyes:  Negative for redness and eye dryness.   Respiratory:  Negative for cough and shortness of breath.    Cardiovascular:  Negative for chest pain.   Gastrointestinal:  Negative for abdominal distention, constipation, diarrhea, nausea, vomiting and reflux.   Genitourinary:  Negative for genital sores.   Musculoskeletal:  Negative for arthralgias, back pain, gait problem, joint swelling, leg pain, myalgias, neck pain, neck stiffness and joint deformity.   Integumentary:  Negative for rash.   Neurological:  Negative for weakness, numbness, headaches and memory loss.   Hematological:  Negative for adenopathy. Does not bruise/bleed easily.   Psychiatric/Behavioral:  Negative for confusion, decreased concentration, sleep disturbance and suicidal ideas. The patient is not nervous/anxious.    All other systems reviewed and are negative.     Chronic comorbid conditions affecting medical decision making today:  History reviewed. No pertinent past medical history.  Past Surgical History:   Procedure Laterality Date    SPINE SURGERY       Family History   Problem Relation Age of Onset    Cancer Father 70        Prostate cancer.    Hypertension Father      Social History     Substance and Sexual Activity   Alcohol Use None     Social History     Tobacco Use   Smoking Status Never   Smokeless Tobacco Never     Social History     Substance and Sexual Activity   Drug Use Not on file       Current Outpatient Medications:     methylPREDNISolone (MEDROL DOSEPACK) 4 mg tablet, use as directed, Disp: 1 each, Rfl: 0    prednisoLONE acetate (PRED FORTE) 1 % DrpS, apply 3 drops to right ear twice daily for 10 days, Disp: 10 mL, Rfl: 0    rifAMpin (RIFADIN) 300 MG capsule, Take 1 capsule (300 mg total) by mouth 2 (two) times a day.,  Disp: 60 capsule, Rfl: 3    acetaminophen (TYLENOL) 100 mg/mL suspension, Take by mouth every 4 (four) hours as needed for Temperature greater than., Disp: , Rfl:     ciprofloxacin HCl (CILOXAN) 0.3 % ophthalmic solution, Apply 3 drops to the right ear twice daily (Patient not taking: No sig reported), Disp: 10 mL, Rfl: 0    ergocalciferol (ERGOCALCIFEROL) 50,000 unit Cap, Take 1 capsule (50,000 Units total) by mouth every 7 days., Disp: 12 capsule, Rfl: 0    fluticasone propionate (FLONASE) 50 mcg/actuation nasal spray, Spray 2 sprays (100 mcg total) by Each Nare route once daily., Disp: 15.8 mL, Rfl: 12    folic acid (FOLVITE) 1 MG tablet, Take 1 tablet (1 mg total) by mouth once daily., Disp: 90 tablet, Rfl: 3    hydrOXYchloroQUINE (PLAQUENIL) 200 mg tablet, Take 2 tablets (400 mg total) by mouth once daily., Disp: 180 tablet, Rfl: 3    methotrexate 2.5 MG Tab, Take 10 tablets (25 mg total) by mouth every 7 days., Disp: 120 tablet, Rfl: 3    predniSONE (DELTASONE) 5 MG tablet, Take 1 tablet (5 mg total) by mouth once daily., Disp: 90 tablet, Rfl: 3     Objective:         There were no vitals filed for this visit.      Physical Exam  Can make fist, no synovitis  Knee crepitus  Negative ankle/MTP    4/26/23: Virtual Visit     Reviewed old and all outside pertinent medical records available.    All lab results personally reviewed and interpreted by me.  Lab Results   Component Value Date    WBC 4.07 02/17/2023    HGB 13.7 (L) 02/17/2023    HCT 43.4 02/17/2023    MCV 95 02/17/2023    MCH 30.0 02/17/2023    MCHC 31.6 (L) 02/17/2023    RDW 13.4 02/17/2023     02/17/2023    MPV 12.1 02/17/2023       Lab Results   Component Value Date     02/17/2023    K 4.3 02/17/2023     02/17/2023    CO2 28 02/17/2023    GLU 62 (L) 02/17/2023    BUN 15 02/17/2023    CALCIUM 9.6 02/17/2023    PROT 7.7 02/17/2023    ALBUMIN 4.2 02/17/2023    BILITOT 0.3 02/17/2023    AST 28 02/17/2023    ALKPHOS 87 02/17/2023    ALT  21 02/17/2023       Lab Results   Component Value Date    COLORU Yellow 02/17/2023    APPEARANCEUA Clear 02/17/2023    SPECGRAV 1.025 02/17/2023    PHUR 6.0 02/17/2023    PROTEINUA Trace (A) 02/17/2023    KETONESU Negative 02/17/2023    LEUKOCYTESUR Negative 02/17/2023    NITRITE Negative 02/17/2023    UROBILINOGEN Negative 02/04/2022       Lab Results   Component Value Date    CRP 3.0 02/17/2023       Lab Results   Component Value Date    SEDRATE 10 02/17/2023       Lab Results   Component Value Date    RF <13.0 10/04/2021    SEDRATE 10 02/17/2023       No components found for: 25OHVITDTOT, 44TBXEMG8, 91JJGWJQ5, METHODNOTE    Lab Results   Component Value Date    URICACID 4.4 09/14/2022       No components found for: TSPOTTB        Imaging:  All imaging reviewed and independently interpreted by me.         ASSESSMENT / PLAN:     Jone Mathur is a 47 y.o. Black or  male with:      1. Systemic lupus erythematosus, unspecified SLE type, unspecified organ involvement status  - patient with SLE  - doing well  - continue current regimen: MTX 25mg weekly, HCQ 400mg  - will attempt to taper PDN to 2.5mg for one month then taper off, if recurrence increase dose back to 5mg  - sun screen advised  - warmth precautions  - update labs   - reassurance     2. Encounter for long-term (current) use of other medications  - annual EYE exam  - daily FA  - monitor labs  - vaccines per guidelines  - immunosuppression/infections precautions discussed     3. Steroid Use  -I discussed the potential adverse effects of long term PDN use (I.e. osteoporosis, increase risk of infection, skin fragility, weight gain, blood sugar elevation and avascular necrosis). I encourage patient to take Calcium (1200mg daily) and Vit D (800 units daily) while on PDN.     4. Latent TB  - s/p Rifampin    5. Other specified counseling  - over 10 minutes spent regarding below topics:  - Immunization counseling done.  - Weight loss counseling  done.  - Nutrition and exercise counseling.  - Limitation of alcohol consumption.  - Regular exercise:  Aerobic and resistance.  - Medication counseling provided.      Follow up in about 3 months (around 7/26/2023).    Method of contact with patient concerns: [facility]    Disclaimer:  This note is prepared using voice recognition software and as such is likely to have errors and has not been proof read. Please contact me for questions.     Time spent: 30 minutes in face to face discussion concerning diagnosis, prognosis, review of lab and test results, benefits of treatment as well as management of disease, counseling of patient and coordination of care between various health care providers.  Greater than half the time spent was used for coordination of care and counseling of patient.    Rich Hooper M.D.  Rheumatology Department   [facility]

## 2023-06-01 ENCOUNTER — LAB VISIT (OUTPATIENT)
Dept: LAB | Facility: HOSPITAL | Age: 48
End: 2023-06-01
Attending: INTERNAL MEDICINE
Payer: COMMERCIAL

## 2023-06-01 DIAGNOSIS — M32.9 SYSTEMIC LUPUS ERYTHEMATOSUS, UNSPECIFIED SLE TYPE, UNSPECIFIED ORGAN INVOLVEMENT STATUS: ICD-10-CM

## 2023-06-01 PROCEDURE — 86160 COMPLEMENT ANTIGEN: CPT | Performed by: INTERNAL MEDICINE

## 2023-06-01 PROCEDURE — 86225 DNA ANTIBODY NATIVE: CPT | Mod: 59 | Performed by: INTERNAL MEDICINE

## 2023-06-01 PROCEDURE — 80053 COMPREHEN METABOLIC PANEL: CPT | Performed by: INTERNAL MEDICINE

## 2023-06-01 PROCEDURE — 86225 DNA ANTIBODY NATIVE: CPT | Performed by: INTERNAL MEDICINE

## 2023-06-01 PROCEDURE — 86160 COMPLEMENT ANTIGEN: CPT | Mod: 59 | Performed by: INTERNAL MEDICINE

## 2023-06-01 PROCEDURE — 36415 COLL VENOUS BLD VENIPUNCTURE: CPT | Mod: PN | Performed by: INTERNAL MEDICINE

## 2023-06-01 PROCEDURE — 84156 ASSAY OF PROTEIN URINE: CPT | Performed by: INTERNAL MEDICINE

## 2023-06-01 PROCEDURE — 86140 C-REACTIVE PROTEIN: CPT | Performed by: INTERNAL MEDICINE

## 2023-06-01 PROCEDURE — 85025 COMPLETE CBC W/AUTO DIFF WBC: CPT | Performed by: INTERNAL MEDICINE

## 2023-06-01 PROCEDURE — 85652 RBC SED RATE AUTOMATED: CPT | Performed by: INTERNAL MEDICINE

## 2023-06-02 LAB
ALBUMIN SERPL BCP-MCNC: 4 G/DL (ref 3.5–5.2)
ALP SERPL-CCNC: 71 U/L (ref 55–135)
ALT SERPL W/O P-5'-P-CCNC: 41 U/L (ref 10–44)
ANION GAP SERPL CALC-SCNC: 7 MMOL/L (ref 8–16)
AST SERPL-CCNC: 38 U/L (ref 10–40)
BASOPHILS # BLD AUTO: 0.04 K/UL (ref 0–0.2)
BASOPHILS NFR BLD: 1 % (ref 0–1.9)
BILIRUB SERPL-MCNC: 0.7 MG/DL (ref 0.1–1)
BUN SERPL-MCNC: 17 MG/DL (ref 6–20)
C3 SERPL-MCNC: 103 MG/DL (ref 50–180)
C4 SERPL-MCNC: 23 MG/DL (ref 11–44)
CALCIUM SERPL-MCNC: 9.1 MG/DL (ref 8.7–10.5)
CHLORIDE SERPL-SCNC: 105 MMOL/L (ref 95–110)
CO2 SERPL-SCNC: 25 MMOL/L (ref 23–29)
CREAT SERPL-MCNC: 1.1 MG/DL (ref 0.5–1.4)
CREAT UR-MCNC: 206 MG/DL (ref 23–375)
CRP SERPL-MCNC: 4.4 MG/L (ref 0–8.2)
DIFFERENTIAL METHOD: ABNORMAL
EOSINOPHIL # BLD AUTO: 0.1 K/UL (ref 0–0.5)
EOSINOPHIL NFR BLD: 3.5 % (ref 0–8)
ERYTHROCYTE [DISTWIDTH] IN BLOOD BY AUTOMATED COUNT: 13.7 % (ref 11.5–14.5)
ERYTHROCYTE [SEDIMENTATION RATE] IN BLOOD BY PHOTOMETRIC METHOD: 19 MM/HR (ref 0–23)
EST. GFR  (NO RACE VARIABLE): >60 ML/MIN/1.73 M^2
GLUCOSE SERPL-MCNC: 79 MG/DL (ref 70–110)
HCT VFR BLD AUTO: 39 % (ref 40–54)
HGB BLD-MCNC: 12.2 G/DL (ref 14–18)
IMM GRANULOCYTES # BLD AUTO: 0.01 K/UL (ref 0–0.04)
IMM GRANULOCYTES NFR BLD AUTO: 0.2 % (ref 0–0.5)
LYMPHOCYTES # BLD AUTO: 2.2 K/UL (ref 1–4.8)
LYMPHOCYTES NFR BLD: 54.8 % (ref 18–48)
MCH RBC QN AUTO: 29.9 PG (ref 27–31)
MCHC RBC AUTO-ENTMCNC: 31.3 G/DL (ref 32–36)
MCV RBC AUTO: 96 FL (ref 82–98)
MONOCYTES # BLD AUTO: 0.4 K/UL (ref 0.3–1)
MONOCYTES NFR BLD: 10.2 % (ref 4–15)
NEUTROPHILS # BLD AUTO: 1.2 K/UL (ref 1.8–7.7)
NEUTROPHILS NFR BLD: 30.3 % (ref 38–73)
NRBC BLD-RTO: 0 /100 WBC
PLATELET # BLD AUTO: 172 K/UL (ref 150–450)
PMV BLD AUTO: 12.1 FL (ref 9.2–12.9)
POTASSIUM SERPL-SCNC: 4.1 MMOL/L (ref 3.5–5.1)
PROT SERPL-MCNC: 7.2 G/DL (ref 6–8.4)
PROT UR-MCNC: <7 MG/DL (ref 0–15)
PROT/CREAT UR: NORMAL MG/G{CREAT} (ref 0–0.2)
RBC # BLD AUTO: 4.08 M/UL (ref 4.6–6.2)
SODIUM SERPL-SCNC: 137 MMOL/L (ref 136–145)
WBC # BLD AUTO: 4.03 K/UL (ref 3.9–12.7)

## 2023-06-06 ENCOUNTER — OFFICE VISIT (OUTPATIENT)
Dept: OPHTHALMOLOGY | Facility: CLINIC | Age: 48
End: 2023-06-06
Payer: COMMERCIAL

## 2023-06-06 DIAGNOSIS — M32.9 SYSTEMIC LUPUS ERYTHEMATOSUS, UNSPECIFIED SLE TYPE, UNSPECIFIED ORGAN INVOLVEMENT STATUS: ICD-10-CM

## 2023-06-06 DIAGNOSIS — Z79.899 EYE EXAM DUE TO HIGH RISK MEDICATION, ENCOUNTER FOR: Primary | ICD-10-CM

## 2023-06-06 DIAGNOSIS — H35.52 RETINITIS PIGMENTOSA OF BOTH EYES: ICD-10-CM

## 2023-06-06 LAB
DNA TITER: NORMAL
DSDNA AB SER-ACNC: POSITIVE [IU]/ML

## 2023-06-06 PROCEDURE — 99999 PR PBB SHADOW E&M-EST. PATIENT-LVL III: CPT | Mod: PBBFAC,,, | Performed by: STUDENT IN AN ORGANIZED HEALTH CARE EDUCATION/TRAINING PROGRAM

## 2023-06-06 PROCEDURE — 99213 PR OFFICE/OUTPT VISIT, EST, LEVL III, 20-29 MIN: ICD-10-PCS | Mod: S$GLB,,, | Performed by: STUDENT IN AN ORGANIZED HEALTH CARE EDUCATION/TRAINING PROGRAM

## 2023-06-06 PROCEDURE — 1159F MED LIST DOCD IN RCRD: CPT | Mod: CPTII,S$GLB,, | Performed by: STUDENT IN AN ORGANIZED HEALTH CARE EDUCATION/TRAINING PROGRAM

## 2023-06-06 PROCEDURE — 1160F PR REVIEW ALL MEDS BY PRESCRIBER/CLIN PHARMACIST DOCUMENTED: ICD-10-PCS | Mod: CPTII,S$GLB,, | Performed by: STUDENT IN AN ORGANIZED HEALTH CARE EDUCATION/TRAINING PROGRAM

## 2023-06-06 PROCEDURE — 1160F RVW MEDS BY RX/DR IN RCRD: CPT | Mod: CPTII,S$GLB,, | Performed by: STUDENT IN AN ORGANIZED HEALTH CARE EDUCATION/TRAINING PROGRAM

## 2023-06-06 PROCEDURE — 99213 OFFICE O/P EST LOW 20 MIN: CPT | Mod: S$GLB,,, | Performed by: STUDENT IN AN ORGANIZED HEALTH CARE EDUCATION/TRAINING PROGRAM

## 2023-06-06 PROCEDURE — 99999 PR PBB SHADOW E&M-EST. PATIENT-LVL III: ICD-10-PCS | Mod: PBBFAC,,, | Performed by: STUDENT IN AN ORGANIZED HEALTH CARE EDUCATION/TRAINING PROGRAM

## 2023-06-06 PROCEDURE — 1159F PR MEDICATION LIST DOCUMENTED IN MEDICAL RECORD: ICD-10-PCS | Mod: CPTII,S$GLB,, | Performed by: STUDENT IN AN ORGANIZED HEALTH CARE EDUCATION/TRAINING PROGRAM

## 2023-06-06 PROCEDURE — 92083 EXTENDED VISUAL FIELD XM: CPT | Mod: S$GLB,,, | Performed by: STUDENT IN AN ORGANIZED HEALTH CARE EDUCATION/TRAINING PROGRAM

## 2023-06-06 PROCEDURE — 92083 HUMPHREY VISUAL FIELD - OU - BOTH EYES: ICD-10-PCS | Mod: S$GLB,,, | Performed by: STUDENT IN AN ORGANIZED HEALTH CARE EDUCATION/TRAINING PROGRAM

## 2023-06-06 NOTE — PROGRESS NOTES
HPI    Pt in today for a follow-up. Pt states his vision hasn't changed since his   last visit. No pain or discomfort.     1. + plaquenil - started 10/2021  Lupus  2. Hx of trauma OS  3. PVD OD  4. RP OU    Last edited by Sandee Baldwin on 6/6/2023  3:45 PM.            Assessment /Plan     For exam results, see Encounter Report.    Eye exam due to high risk medication, encounter for  Systemic lupus erythematosus, unspecified SLE type, unspecified organ involvement status-   Baseline HVF done and WNL, will monitor    Retinitis pigmentosa of both eyes- Follow      Return to clinic in 1 year DFE and GOCT

## 2023-07-31 ENCOUNTER — OFFICE VISIT (OUTPATIENT)
Dept: RHEUMATOLOGY | Facility: CLINIC | Age: 48
End: 2023-07-31
Payer: COMMERCIAL

## 2023-07-31 DIAGNOSIS — Z71.89 COUNSELING AND COORDINATION OF CARE: ICD-10-CM

## 2023-07-31 DIAGNOSIS — D84.821 IMMUNODEFICIENCY DUE TO TREATMENT WITH IMMUNOSUPPRESSIVE MEDICATION: ICD-10-CM

## 2023-07-31 DIAGNOSIS — Z79.899 IMMUNODEFICIENCY DUE TO TREATMENT WITH IMMUNOSUPPRESSIVE MEDICATION: ICD-10-CM

## 2023-07-31 DIAGNOSIS — M32.9 SYSTEMIC LUPUS ERYTHEMATOSUS, UNSPECIFIED SLE TYPE, UNSPECIFIED ORGAN INVOLVEMENT STATUS: Primary | ICD-10-CM

## 2023-07-31 PROCEDURE — 99214 PR OFFICE/OUTPT VISIT, EST, LEVL IV, 30-39 MIN: ICD-10-PCS | Mod: 95,,, | Performed by: INTERNAL MEDICINE

## 2023-07-31 PROCEDURE — 99214 OFFICE O/P EST MOD 30 MIN: CPT | Mod: 95,,, | Performed by: INTERNAL MEDICINE

## 2023-07-31 RX ORDER — FOLIC ACID 1 MG/1
1 TABLET ORAL DAILY
Qty: 90 TABLET | Refills: 3 | Status: SHIPPED | OUTPATIENT
Start: 2023-07-31 | End: 2023-12-28 | Stop reason: SDUPTHER

## 2023-07-31 RX ORDER — HYDROXYCHLOROQUINE SULFATE 200 MG/1
400 TABLET, FILM COATED ORAL DAILY
Qty: 180 TABLET | Refills: 3 | Status: SHIPPED | OUTPATIENT
Start: 2023-07-31 | End: 2023-12-28 | Stop reason: SDUPTHER

## 2023-07-31 RX ORDER — METHOTREXATE 2.5 MG/1
25 TABLET ORAL
Qty: 120 TABLET | Refills: 3 | Status: SHIPPED | OUTPATIENT
Start: 2023-07-31 | End: 2023-12-28 | Stop reason: SDUPTHER

## 2023-07-31 NOTE — PROGRESS NOTES
The patient location is: [place]   The chief complaint leading to consultation is: SLE Follow up  Visit type: Virtual visit with synchronous audio and video  Total time spent with patient: 15 minutes     Each patient to whom he or she provides medical services by telemedicine is:  (1) informed of the relationship between the physician and patient and the respective role of any other health care provider with respect to management of the patient; and (2) notified that he or she may decline to receive medical services by telemedicine and may withdraw from such care at any time.           RHEUMATOLOGY OUTPATIENT CLINIC NOTE    7/31/2023    Attending Rheumatologist: Rich Hooper  Primary Care Provider: Love Tijerina MD   Physician Requesting Consultation: No referring provider defined for this encounter.  Chief Complaint/Reason For Consultation:  No chief complaint on file.        Subjective:       PEDRO PABLO Mathur is a 47 y.o. Black or  male with medical history noted below presents for evaluation of lupus.  Patient notes Raynaud's for over 10 years.  Notes around 2007 seen swelling of the fingers and hands was seen by rheumatologists noted to have positive SARA, Grajeda, RNP, SSA and cryoglobulins.  Patient was last seen by Dr. Valenzuela October 19, 2021 at which point confirmed diagnosis of lupus and started on Plaquenil.  Patient notes since starting medications better control of pain and swelling but still has flare ups.  Endorses rash, Raynaud's.     Today  Patient here for follow up.   Last visit SLE management continued. He has tapered off PDN, now for about 3 weeks. Feeling well. Notes he is running 3 miles 3 times a week. Has had no flares, tolerating meds.     Review of Systems   Constitutional:  Negative for chills, fatigue, fever and unexpected weight change.   HENT:  Negative for mouth sores and trouble swallowing.    Eyes:  Negative for redness and eye dryness.   Respiratory:  Negative  for cough and shortness of breath.    Cardiovascular:  Negative for chest pain.   Gastrointestinal:  Negative for abdominal distention, constipation, diarrhea, nausea, vomiting and reflux.   Genitourinary:  Negative for genital sores.   Musculoskeletal:  Negative for arthralgias, back pain, gait problem, joint swelling, leg pain, myalgias, neck pain, neck stiffness and joint deformity.   Integumentary:  Negative for rash.   Neurological:  Negative for weakness, numbness, headaches and memory loss.   Hematological:  Negative for adenopathy. Does not bruise/bleed easily.   Psychiatric/Behavioral:  Negative for confusion, decreased concentration, sleep disturbance and suicidal ideas. The patient is not nervous/anxious.    All other systems reviewed and are negative.       Chronic comorbid conditions affecting medical decision making today:  No past medical history on file.  Past Surgical History:   Procedure Laterality Date    SPINE SURGERY       Family History   Problem Relation Age of Onset    Cancer Father 70        Prostate cancer.    Hypertension Father      Social History     Substance and Sexual Activity   Alcohol Use None     Social History     Tobacco Use   Smoking Status Never   Smokeless Tobacco Never     Social History     Substance and Sexual Activity   Drug Use Not on file       Current Outpatient Medications:     acetaminophen (TYLENOL) 100 mg/mL suspension, Take by mouth every 4 (four) hours as needed for Temperature greater than., Disp: , Rfl:     ciprofloxacin HCl (CILOXAN) 0.3 % ophthalmic solution, Apply 3 drops to the right ear twice daily (Patient not taking: No sig reported), Disp: 10 mL, Rfl: 0    ergocalciferol (ERGOCALCIFEROL) 50,000 unit Cap, Take 1 capsule (50,000 Units total) by mouth every 7 days., Disp: 12 capsule, Rfl: 0    fluticasone propionate (FLONASE) 50 mcg/actuation nasal spray, Spray 2 sprays (100 mcg total) by Each Nare route once daily., Disp: 15.8 mL, Rfl: 12    folic acid  (FOLVITE) 1 MG tablet, Take 1 tablet (1 mg total) by mouth once daily., Disp: 90 tablet, Rfl: 3    hydrOXYchloroQUINE (PLAQUENIL) 200 mg tablet, Take 2 tablets (400 mg total) by mouth once daily., Disp: 180 tablet, Rfl: 3    methotrexate 2.5 MG Tab, Take 10 tablets (25 mg total) by mouth every 7 days., Disp: 120 tablet, Rfl: 3    methylPREDNISolone (MEDROL DOSEPACK) 4 mg tablet, use as directed, Disp: 1 each, Rfl: 0    prednisoLONE acetate (PRED FORTE) 1 % DrpS, apply 3 drops to right ear twice daily for 10 days, Disp: 10 mL, Rfl: 0    predniSONE (DELTASONE) 5 MG tablet, Take 1 tablet (5 mg total) by mouth once daily., Disp: 90 tablet, Rfl: 3    rifAMpin (RIFADIN) 300 MG capsule, Take 1 capsule (300 mg total) by mouth 2 (two) times a day., Disp: 60 capsule, Rfl: 3     Objective:         There were no vitals filed for this visit.      Physical Exam  Can make fist, no synovitis  Knee crepitus  Negative ankle/MTP    7/31/23: Virtual Visit     Reviewed old and all outside pertinent medical records available.    All lab results personally reviewed and interpreted by me.  Lab Results   Component Value Date    WBC 4.03 06/01/2023    HGB 12.2 (L) 06/01/2023    HCT 39.0 (L) 06/01/2023    MCV 96 06/01/2023    MCH 29.9 06/01/2023    MCHC 31.3 (L) 06/01/2023    RDW 13.7 06/01/2023     06/01/2023    MPV 12.1 06/01/2023       Lab Results   Component Value Date     06/01/2023    K 4.1 06/01/2023     06/01/2023    CO2 25 06/01/2023    GLU 79 06/01/2023    BUN 17 06/01/2023    CALCIUM 9.1 06/01/2023    PROT 7.2 06/01/2023    ALBUMIN 4.0 06/01/2023    BILITOT 0.7 06/01/2023    AST 38 06/01/2023    ALKPHOS 71 06/01/2023    ALT 41 06/01/2023       Lab Results   Component Value Date    COLORU Yellow 06/01/2023    APPEARANCEUA Clear 06/01/2023    SPECGRAV 1.020 06/01/2023    PHUR 6.0 06/01/2023    PROTEINUA Trace (A) 06/01/2023    KETONESU Negative 06/01/2023    LEUKOCYTESUR Negative 06/01/2023    NITRITE Negative  "06/01/2023    UROBILINOGEN Negative 02/04/2022       Lab Results   Component Value Date    CRP 4.4 06/01/2023       Lab Results   Component Value Date    SEDRATE 19 06/01/2023       Lab Results   Component Value Date    RF <13.0 10/04/2021    SEDRATE 19 06/01/2023       No components found for: "25OHVITDTOT", "04TVFYUX0", "74JQBMNG9", "METHODNOTE"    Lab Results   Component Value Date    URICACID 4.4 09/14/2022       No components found for: "TSPOTTB"        Imaging:  All imaging reviewed and independently interpreted by me.         ASSESSMENT / PLAN:     Jone Mathur is a 47 y.o. Black or  male with:      1. Systemic lupus erythematosus, unspecified SLE type, unspecified organ involvement status  - patient with SLE  - doing well  - continue current regimen: MTX 25mg weekly, HCQ 400mg  - sun screen advised  - warmth precautions  - reassurance     2. Encounter for long-term (current) use of other medications  - annual EYE exam  - daily FA  - monitor labs  - vaccines per guidelines  - immunosuppression/infections precautions discussed     3. Latent TB  - s/p Rifampin    4. Other specified counseling  - over 10 minutes spent regarding below topics:  - Immunization counseling done.  - Weight loss counseling done.  - Nutrition and exercise counseling.  - Limitation of alcohol consumption.  - Regular exercise:  Aerobic and resistance.  - Medication counseling provided.      Follow up in about 3 months (around 10/31/2023).    Method of contact with patient concerns: Elvia attn Rheumatology    Disclaimer:  This note is prepared using voice recognition software and as such is likely to have errors and has not been proof read. Please contact me for questions.     Time spent: 30 minutes in face to face discussion concerning diagnosis, prognosis, review of lab and test results, benefits of treatment as well as management of disease, counseling of patient and coordination of care between various health care " providers.  Greater than half the time spent was used for coordination of care and counseling of patient.    Rich Hooper M.D.  Rheumatology Department   [facility]

## 2023-09-27 DIAGNOSIS — M32.9 SYSTEMIC LUPUS ERYTHEMATOSUS, UNSPECIFIED SLE TYPE, UNSPECIFIED ORGAN INVOLVEMENT STATUS: Primary | ICD-10-CM

## 2023-09-29 ENCOUNTER — OFFICE VISIT (OUTPATIENT)
Dept: INTERNAL MEDICINE | Facility: CLINIC | Age: 48
End: 2023-09-29
Payer: COMMERCIAL

## 2023-09-29 ENCOUNTER — LAB VISIT (OUTPATIENT)
Dept: LAB | Facility: HOSPITAL | Age: 48
End: 2023-09-29
Attending: STUDENT IN AN ORGANIZED HEALTH CARE EDUCATION/TRAINING PROGRAM
Payer: COMMERCIAL

## 2023-09-29 VITALS
HEIGHT: 70 IN | WEIGHT: 204.13 LBS | HEART RATE: 60 BPM | BODY MASS INDEX: 29.22 KG/M2 | SYSTOLIC BLOOD PRESSURE: 129 MMHG | DIASTOLIC BLOOD PRESSURE: 85 MMHG

## 2023-09-29 DIAGNOSIS — M32.8 OTHER FORMS OF SYSTEMIC LUPUS ERYTHEMATOSUS, UNSPECIFIED ORGAN INVOLVEMENT STATUS: Chronic | ICD-10-CM

## 2023-09-29 DIAGNOSIS — Z00.00 HEALTHCARE MAINTENANCE: ICD-10-CM

## 2023-09-29 DIAGNOSIS — M32.9 SYSTEMIC LUPUS ERYTHEMATOSUS, UNSPECIFIED SLE TYPE, UNSPECIFIED ORGAN INVOLVEMENT STATUS: ICD-10-CM

## 2023-09-29 DIAGNOSIS — D64.9 NORMOCYTIC ANEMIA: ICD-10-CM

## 2023-09-29 DIAGNOSIS — K21.9 GASTROESOPHAGEAL REFLUX DISEASE WITHOUT ESOPHAGITIS: ICD-10-CM

## 2023-09-29 DIAGNOSIS — Z00.00 ANNUAL PHYSICAL EXAM: Primary | ICD-10-CM

## 2023-09-29 LAB
ALBUMIN SERPL BCP-MCNC: 3.9 G/DL (ref 3.5–5.2)
ALP SERPL-CCNC: 77 U/L (ref 55–135)
ALT SERPL W/O P-5'-P-CCNC: 26 U/L (ref 10–44)
ANION GAP SERPL CALC-SCNC: 6 MMOL/L (ref 8–16)
AST SERPL-CCNC: 25 U/L (ref 10–40)
BACTERIA #/AREA URNS AUTO: NORMAL /HPF
BASOPHILS # BLD AUTO: 0.04 K/UL (ref 0–0.2)
BASOPHILS NFR BLD: 1.2 % (ref 0–1.9)
BILIRUB SERPL-MCNC: 0.7 MG/DL (ref 0.1–1)
BILIRUB UR QL STRIP: NEGATIVE
BUN SERPL-MCNC: 13 MG/DL (ref 6–20)
CALCIUM SERPL-MCNC: 9.2 MG/DL (ref 8.7–10.5)
CHLORIDE SERPL-SCNC: 106 MMOL/L (ref 95–110)
CHOLEST SERPL-MCNC: 174 MG/DL (ref 120–199)
CHOLEST/HDLC SERPL: 3.6 {RATIO} (ref 2–5)
CLARITY UR REFRACT.AUTO: CLEAR
CO2 SERPL-SCNC: 27 MMOL/L (ref 23–29)
COLOR UR AUTO: YELLOW
COMPLEXED PSA SERPL-MCNC: 2.1 NG/ML (ref 0–4)
CREAT SERPL-MCNC: 1.1 MG/DL (ref 0.5–1.4)
DIFFERENTIAL METHOD: ABNORMAL
EOSINOPHIL # BLD AUTO: 0.1 K/UL (ref 0–0.5)
EOSINOPHIL NFR BLD: 3.8 % (ref 0–8)
ERYTHROCYTE [DISTWIDTH] IN BLOOD BY AUTOMATED COUNT: 14.3 % (ref 11.5–14.5)
EST. GFR  (NO RACE VARIABLE): >60 ML/MIN/1.73 M^2
ESTIMATED AVG GLUCOSE: 114 MG/DL (ref 68–131)
GLUCOSE SERPL-MCNC: 88 MG/DL (ref 70–110)
GLUCOSE UR QL STRIP: NEGATIVE
HBA1C MFR BLD: 5.6 % (ref 4–5.6)
HCT VFR BLD AUTO: 40.3 % (ref 40–54)
HDLC SERPL-MCNC: 48 MG/DL (ref 40–75)
HDLC SERPL: 27.6 % (ref 20–50)
HGB BLD-MCNC: 12.9 G/DL (ref 14–18)
HGB UR QL STRIP: NEGATIVE
HYALINE CASTS UR QL AUTO: 0 /LPF
IMM GRANULOCYTES # BLD AUTO: 0.01 K/UL (ref 0–0.04)
IMM GRANULOCYTES NFR BLD AUTO: 0.3 % (ref 0–0.5)
KETONES UR QL STRIP: NEGATIVE
LDLC SERPL CALC-MCNC: 112.6 MG/DL (ref 63–159)
LEUKOCYTE ESTERASE UR QL STRIP: NEGATIVE
LYMPHOCYTES # BLD AUTO: 1.9 K/UL (ref 1–4.8)
LYMPHOCYTES NFR BLD: 54.9 % (ref 18–48)
MCH RBC QN AUTO: 30.2 PG (ref 27–31)
MCHC RBC AUTO-ENTMCNC: 32 G/DL (ref 32–36)
MCV RBC AUTO: 94 FL (ref 82–98)
MICROSCOPIC COMMENT: NORMAL
MONOCYTES # BLD AUTO: 0.5 K/UL (ref 0.3–1)
MONOCYTES NFR BLD: 13.4 % (ref 4–15)
NEUTROPHILS # BLD AUTO: 0.9 K/UL (ref 1.8–7.7)
NEUTROPHILS NFR BLD: 26.4 % (ref 38–73)
NITRITE UR QL STRIP: NEGATIVE
NONHDLC SERPL-MCNC: 126 MG/DL
NRBC BLD-RTO: 0 /100 WBC
PH UR STRIP: 6 [PH] (ref 5–8)
PLATELET # BLD AUTO: 176 K/UL (ref 150–450)
PMV BLD AUTO: 11.9 FL (ref 9.2–12.9)
POTASSIUM SERPL-SCNC: 3.9 MMOL/L (ref 3.5–5.1)
PROT SERPL-MCNC: 7.2 G/DL (ref 6–8.4)
PROT UR QL STRIP: ABNORMAL
RBC # BLD AUTO: 4.27 M/UL (ref 4.6–6.2)
RBC #/AREA URNS AUTO: 0 /HPF (ref 0–4)
SODIUM SERPL-SCNC: 139 MMOL/L (ref 136–145)
SP GR UR STRIP: 1.02 (ref 1–1.03)
TRIGL SERPL-MCNC: 67 MG/DL (ref 30–150)
TSH SERPL DL<=0.005 MIU/L-ACNC: 1.95 UIU/ML (ref 0.4–4)
URN SPEC COLLECT METH UR: ABNORMAL
WBC # BLD AUTO: 3.44 K/UL (ref 3.9–12.7)
WBC #/AREA URNS AUTO: 0 /HPF (ref 0–5)

## 2023-09-29 PROCEDURE — 3008F PR BODY MASS INDEX (BMI) DOCUMENTED: ICD-10-PCS | Mod: CPTII,S$GLB,, | Performed by: STUDENT IN AN ORGANIZED HEALTH CARE EDUCATION/TRAINING PROGRAM

## 2023-09-29 PROCEDURE — 80053 COMPREHEN METABOLIC PANEL: CPT | Performed by: STUDENT IN AN ORGANIZED HEALTH CARE EDUCATION/TRAINING PROGRAM

## 2023-09-29 PROCEDURE — 3044F PR MOST RECENT HEMOGLOBIN A1C LEVEL <7.0%: ICD-10-PCS | Mod: CPTII,S$GLB,, | Performed by: STUDENT IN AN ORGANIZED HEALTH CARE EDUCATION/TRAINING PROGRAM

## 2023-09-29 PROCEDURE — 99999 PR PBB SHADOW E&M-EST. PATIENT-LVL IV: ICD-10-PCS | Mod: PBBFAC,,, | Performed by: STUDENT IN AN ORGANIZED HEALTH CARE EDUCATION/TRAINING PROGRAM

## 2023-09-29 PROCEDURE — 3074F PR MOST RECENT SYSTOLIC BLOOD PRESSURE < 130 MM HG: ICD-10-PCS | Mod: CPTII,S$GLB,, | Performed by: STUDENT IN AN ORGANIZED HEALTH CARE EDUCATION/TRAINING PROGRAM

## 2023-09-29 PROCEDURE — 3044F HG A1C LEVEL LT 7.0%: CPT | Mod: CPTII,S$GLB,, | Performed by: STUDENT IN AN ORGANIZED HEALTH CARE EDUCATION/TRAINING PROGRAM

## 2023-09-29 PROCEDURE — 80061 LIPID PANEL: CPT | Performed by: STUDENT IN AN ORGANIZED HEALTH CARE EDUCATION/TRAINING PROGRAM

## 2023-09-29 PROCEDURE — 1159F MED LIST DOCD IN RCRD: CPT | Mod: CPTII,S$GLB,, | Performed by: STUDENT IN AN ORGANIZED HEALTH CARE EDUCATION/TRAINING PROGRAM

## 2023-09-29 PROCEDURE — 1160F PR REVIEW ALL MEDS BY PRESCRIBER/CLIN PHARMACIST DOCUMENTED: ICD-10-PCS | Mod: CPTII,S$GLB,, | Performed by: STUDENT IN AN ORGANIZED HEALTH CARE EDUCATION/TRAINING PROGRAM

## 2023-09-29 PROCEDURE — 1160F RVW MEDS BY RX/DR IN RCRD: CPT | Mod: CPTII,S$GLB,, | Performed by: STUDENT IN AN ORGANIZED HEALTH CARE EDUCATION/TRAINING PROGRAM

## 2023-09-29 PROCEDURE — 3079F DIAST BP 80-89 MM HG: CPT | Mod: CPTII,S$GLB,, | Performed by: STUDENT IN AN ORGANIZED HEALTH CARE EDUCATION/TRAINING PROGRAM

## 2023-09-29 PROCEDURE — 1159F PR MEDICATION LIST DOCUMENTED IN MEDICAL RECORD: ICD-10-PCS | Mod: CPTII,S$GLB,, | Performed by: STUDENT IN AN ORGANIZED HEALTH CARE EDUCATION/TRAINING PROGRAM

## 2023-09-29 PROCEDURE — 99396 PR PREVENTIVE VISIT,EST,40-64: ICD-10-PCS | Mod: S$GLB,,, | Performed by: STUDENT IN AN ORGANIZED HEALTH CARE EDUCATION/TRAINING PROGRAM

## 2023-09-29 PROCEDURE — 85025 COMPLETE CBC W/AUTO DIFF WBC: CPT | Performed by: STUDENT IN AN ORGANIZED HEALTH CARE EDUCATION/TRAINING PROGRAM

## 2023-09-29 PROCEDURE — 83036 HEMOGLOBIN GLYCOSYLATED A1C: CPT | Performed by: STUDENT IN AN ORGANIZED HEALTH CARE EDUCATION/TRAINING PROGRAM

## 2023-09-29 PROCEDURE — 84443 ASSAY THYROID STIM HORMONE: CPT | Performed by: STUDENT IN AN ORGANIZED HEALTH CARE EDUCATION/TRAINING PROGRAM

## 2023-09-29 PROCEDURE — 84153 ASSAY OF PSA TOTAL: CPT | Performed by: STUDENT IN AN ORGANIZED HEALTH CARE EDUCATION/TRAINING PROGRAM

## 2023-09-29 PROCEDURE — 36415 COLL VENOUS BLD VENIPUNCTURE: CPT | Performed by: STUDENT IN AN ORGANIZED HEALTH CARE EDUCATION/TRAINING PROGRAM

## 2023-09-29 PROCEDURE — 99396 PREV VISIT EST AGE 40-64: CPT | Mod: S$GLB,,, | Performed by: STUDENT IN AN ORGANIZED HEALTH CARE EDUCATION/TRAINING PROGRAM

## 2023-09-29 PROCEDURE — 3074F SYST BP LT 130 MM HG: CPT | Mod: CPTII,S$GLB,, | Performed by: STUDENT IN AN ORGANIZED HEALTH CARE EDUCATION/TRAINING PROGRAM

## 2023-09-29 PROCEDURE — 3008F BODY MASS INDEX DOCD: CPT | Mod: CPTII,S$GLB,, | Performed by: STUDENT IN AN ORGANIZED HEALTH CARE EDUCATION/TRAINING PROGRAM

## 2023-09-29 PROCEDURE — 81001 URINALYSIS AUTO W/SCOPE: CPT | Performed by: STUDENT IN AN ORGANIZED HEALTH CARE EDUCATION/TRAINING PROGRAM

## 2023-09-29 PROCEDURE — 99999 PR PBB SHADOW E&M-EST. PATIENT-LVL IV: CPT | Mod: PBBFAC,,, | Performed by: STUDENT IN AN ORGANIZED HEALTH CARE EDUCATION/TRAINING PROGRAM

## 2023-09-29 PROCEDURE — 3079F PR MOST RECENT DIASTOLIC BLOOD PRESSURE 80-89 MM HG: ICD-10-PCS | Mod: CPTII,S$GLB,, | Performed by: STUDENT IN AN ORGANIZED HEALTH CARE EDUCATION/TRAINING PROGRAM

## 2023-09-29 RX ORDER — PNEUMOCOCCAL 20-VALENT CONJUGATE VACCINE 2.2; 2.2; 2.2; 2.2; 2.2; 2.2; 2.2; 2.2; 2.2; 2.2; 2.2; 2.2; 2.2; 2.2; 2.2; 2.2; 4.4; 2.2; 2.2; 2.2 UG/.5ML; UG/.5ML; UG/.5ML; UG/.5ML; UG/.5ML; UG/.5ML; UG/.5ML; UG/.5ML; UG/.5ML; UG/.5ML; UG/.5ML; UG/.5ML; UG/.5ML; UG/.5ML; UG/.5ML; UG/.5ML; UG/.5ML; UG/.5ML; UG/.5ML; UG/.5ML
0.5 INJECTION, SUSPENSION INTRAMUSCULAR ONCE
Qty: 0.5 ML | Refills: 0 | Status: SHIPPED | OUTPATIENT
Start: 2023-09-29 | End: 2023-09-29

## 2023-09-29 NOTE — ASSESSMENT & PLAN NOTE
Stable, doing well  Will continue current therapy  Hydroxychloroquine, Folic acid  Continue f/u by rheumatology

## 2023-09-29 NOTE — ASSESSMENT & PLAN NOTE
Health care maintenance and core gaps reviewed and assessed with patient.  Vaccinations recommended:        Tetanus - O       Shingles - N/A       Influenza - O       Pneumonia - O   Colon cancer screning:   Colonoscopy: 2022  Lifestyle recommendations given.  Physical activity recommended.    Legend: Ordered (O), Declined (D), Current (C)

## 2023-09-29 NOTE — PROGRESS NOTES
Subjective:       Patient ID: Jone Mathur is a 48 y.o. male.    Chief Complaint: Follow-up (Back hernia,)    HPI    Jone Mathur is a 48 y.o. male , English speaking, with a history of:  SLE  Latent TB (positive quantiferon) s/p Tx with rifampin x 4 m  GERD      [Local Patient]  [place]  Lives in: [facility] [id number]       Patient comes to the clinic for his annual physical exam.    Patient is currently asymptomatic and has no complaints.  Patient denies CV symptoms, CP, SOB, palpitations.  Patient denies constitutional symptoms, fever, changes in the urine or stool.    No Lupus flairs  No joint pain, or other complications  He had his annual exam.    Feeling well.  He started doing exercise, running (3 to 4 mile)    He had an EGD and colonoscopy done in [facility] last September of 2022.    EGD demonstrated gastritis.  Normal colonoscopy.    Since last seen by me:      9/23/23 annual physical exam    Changes in health or medications: No    Specialists visits and recommendations:   Rheumatology f/u  ID f/u ad Tx for latent TB    H/o ER visits:   NO    H/o Hospitalizations:  NO    H/o falls: None     Life events / lifestyle:   Continues to work for [employer] in [place], changed to night shift.      Most recent laboratories reviewed:    Recent Labs   Lab 02/17/23  0826 06/01/23  1546 09/29/23  1313   WBC 4.07 4.03 3.44 L   Hemoglobin 13.7 L 12.2 L 12.9 L   Hematocrit 43.4 39.0 L 40.3   MCV 95 96 94   Platelets 181 172 176       Recent Labs   Lab 10/04/21  1107 02/04/22  0946 04/01/22  1055 08/01/22  1648 02/17/23  0826 06/01/23  1546 09/29/23  1313   Glucose 82 89 89   < > 62 L 79 88   Sodium 137 138 139   < > 140 137 139   Potassium 4.1 4.4 4.7   < > 4.3 4.1 3.9   BUN 13 13 12   < > 15 17 13   Creatinine 0.8 1.1 1.0   < > 1.1 1.1 1.1   eGFR if non African American >60.0 >60 >60  --   --   --   --    Total Bilirubin 0.6 0.5 0.7   < > 0.3 0.7 0.7   AST 19 22 22   < > 28 38 25   ALT 12 15  "14   < > 21 41 26    < > = values in this interval not displayed.       Recent Labs   Lab 23  1313   Hemoglobin A1C 5.6       Recent Labs   Lab 10/21/22  1024 23  1313   Cholesterol 208 H 174   Triglycerides 77 67   HDL 52 48   LDL Cholesterol 140.6 112.6   Non-HDL Cholesterol 156 126       Recent Labs   Lab 10/04/21  1107 22  1628 23  1313   TSH  --  1.540 1.951   PSA, Screen 1.4  --  2.1       Recent Labs   Lab 22  1628   HIV 1/2 Ag/Ab Non-reactive   Hepatitis C Ab Non-reactive         Current medications:    Current Outpatient Medications:     acetaminophen (TYLENOL) 100 mg/mL suspension, Take by mouth every 4 (four) hours as needed for Temperature greater than., Disp: , Rfl:     ergocalciferol (ERGOCALCIFEROL) 50,000 unit Cap, Take 1 capsule (50,000 Units total) by mouth every 7 days., Disp: 12 capsule, Rfl: 0    folic acid (FOLVITE) 1 MG tablet, Take 1 tablet (1 mg total) by mouth once daily., Disp: 90 tablet, Rfl: 3    hydrOXYchloroQUINE (PLAQUENIL) 200 mg tablet, Take 2 tablets (400 mg total) by mouth once daily., Disp: 180 tablet, Rfl: 3    methotrexate 2.5 MG Tab, Take 10 tablets (25 mg total) by mouth every 7 days., Disp: 120 tablet, Rfl: 3    pneumoc 20-yen conj-dip cr,PF, (PREVNAR 20, PF,) 0.5 mL Syrg injection, Inject 0.5 mLs into the muscle once. for 1 dose, Disp: 0.5 mL, Rfl: 0      Healthcare Maintenance:  Colon cancer screenin in [place], next in  due to FH of colon cancer.      Vaccinations:        Tetanus: NO       Pneumonia: NO       Zoster: NO        Influenza: NO       COVID vaccination: completed x 3    Depression screening: PHQ2 score = 0    Annual Wellness visit approx. Month: September    ROS  11-point review of systems done. Negative except for detailed in the HPI.        Objective:      /85   Pulse 60   Ht 5' 10" (1.778 m)   Wt 92.6 kg (204 lb 2.3 oz)   BMI 29.29 kg/m²     Physical Exam   Physical Exam  Vitals and nursing note " reviewed.   Constitutional:       Appearance: Normal appearance.   HENT:      Head: Normocephalic and atraumatic.      Right Ear: Tympanic membrane normal.      Left Ear: Tympanic membrane normal.      Nose: Nose normal.      Mouth/Throat:      Mouth: Mucous membranes are moist.      Pharynx: Oropharynx is clear.   Eyes:      Extraocular Movements: Extraocular movements intact.      Conjunctiva/sclera: Conjunctivae normal.      Pupils: Pupils are equal, round, and reactive to light.   Cardiovascular:      Rate and Rhythm: Normal rate and regular rhythm.      Pulses: Normal pulses.      Heart sounds: Normal heart sounds.   Pulmonary:      Effort: Pulmonary effort is normal.      Breath sounds: Normal breath sounds.   Abdominal:      General: Bowel sounds are normal. There is no distension.      Palpations: Abdomen is soft.      Tenderness: There is no abdominal tenderness.   Musculoskeletal:         General: Normal range of motion.      Cervical back: Normal range of motion and neck supple.   Skin:     General: Skin is warm.   Neurological:      General: No focal deficit present.      Mental Status: She is alert and oriented to person, place, and time. Mental status is at baseline.   Psychiatric:         Mood and Affect: Mood normal.           Assessment:       1. Annual physical exam  Assessment & Plan:  Patient is in overall good general health.  Stable medical conditions listed on the PMH.  Labs reviewed and patient notified.      Orders:  -     CBC Auto Differential; Future; Expected date: 09/29/2024  -     Comprehensive Metabolic Panel; Future; Expected date: 09/29/2024  -     Hemoglobin A1C; Future; Expected date: 09/29/2024  -     Lipid Panel; Future; Expected date: 09/29/2024  -     Prostate Specific Antigen, Diagnostic; Future; Expected date: 09/29/2024    2. Normocytic anemia  Assessment & Plan:  Chronic, secondary to inflammatory disease (levels.    At baseline.    We will continue to follow.      3. 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 Gastroesophageal reflux disease without esophagitis  Assessment & Plan:  As seen on EGD done in [place] on 9/28/22  Will treat with PPI PRN      5. Healthcare maintenance  Assessment & Plan:  Health care maintenance and core gaps reviewed and assessed with patient.  Vaccinations recommended:        Tetanus - O       Shingles - N/A       Influenza - O       Pneumonia - O   Colon cancer screning:   Colonoscopy: 2022  Lifestyle recommendations given.  Physical activity recommended.    Legend: Ordered (O), Declined (D), Current (C)        Orders:  -     pneumoc 20-yen conj-dip cr,PF, (PREVNAR 20, PF,) 0.5 mL Syrg injection; Inject 0.5 mLs into the muscle once. for 1 dose  Dispense: 0.5 mL; Refill: 0  -     (In Office Administered) Tdap Vaccine       Plan:       Continue physical activity  Continue follow-ups by Rheumatology        Problem list updated, medications reconciled, education provided  Lifestyle recommendations given  AVS printed, explained, and given to the patient.  RTC in : 1 year for annual wellness visit, labs ordered              LEONARDO FIGUEROA MD, MPH  Internal Medicine  [facility]  [facility]

## 2023-09-29 NOTE — ASSESSMENT & PLAN NOTE
Chronic, secondary to inflammatory disease (levels.    At baseline.    We will continue to follow.

## 2023-10-04 DIAGNOSIS — M32.9 SYSTEMIC LUPUS ERYTHEMATOSUS, UNSPECIFIED SLE TYPE, UNSPECIFIED ORGAN INVOLVEMENT STATUS: ICD-10-CM

## 2023-10-04 RX ORDER — ERGOCALCIFEROL 1.25 MG/1
50000 CAPSULE ORAL
Qty: 12 CAPSULE | Refills: 0 | Status: SHIPPED | OUTPATIENT
Start: 2023-10-04 | End: 2023-12-28 | Stop reason: SDUPTHER

## 2023-11-15 ENCOUNTER — TELEPHONE (OUTPATIENT)
Dept: RHEUMATOLOGY | Facility: CLINIC | Age: 48
End: 2023-11-15
Payer: COMMERCIAL

## 2023-11-15 NOTE — TELEPHONE ENCOUNTER
----- Message from Siri Doyle sent at 11/15/2023  4:01 PM CST -----  Regarding: self  [phone]  Type: Lab    Caller is requesting to schedule their Lab appointment prior to annual appointment.    Order is not listed in EPIC.  Please enter order and contact patient to schedule.    Name of Caller: self     Preferred Date and Time of Labs:    Date of EPP Appointment:    Where would they like the lab performed? [facility]    [address] [place] [address]    Would the patient rather a call back or a response via [facility]?  Call back    Best Call Back Number:[phone]    Additional Information: 3 month f/u for blood work before he see's provider, pt would like call back once appt is made    Thanks

## 2023-11-21 ENCOUNTER — OFFICE VISIT (OUTPATIENT)
Dept: OTOLARYNGOLOGY | Facility: CLINIC | Age: 48
End: 2023-11-21
Payer: COMMERCIAL

## 2023-11-21 VITALS — WEIGHT: 204.13 LBS | HEIGHT: 70 IN | BODY MASS INDEX: 29.22 KG/M2 | TEMPERATURE: 98 F

## 2023-11-21 DIAGNOSIS — H66.90 RECURRENT AOM (ACUTE OTITIS MEDIA): Primary | ICD-10-CM

## 2023-11-21 DIAGNOSIS — H92.03 EAR PAIN, BILATERAL: Primary | ICD-10-CM

## 2023-11-21 DIAGNOSIS — H60.91 OTITIS EXTERNA OF RIGHT EAR, UNSPECIFIED CHRONICITY, UNSPECIFIED TYPE: ICD-10-CM

## 2023-11-21 DIAGNOSIS — H61.21 IMPACTED CERUMEN OF RIGHT EAR: ICD-10-CM

## 2023-11-21 PROCEDURE — 3044F HG A1C LEVEL LT 7.0%: CPT | Mod: CPTII,S$GLB,, | Performed by: STUDENT IN AN ORGANIZED HEALTH CARE EDUCATION/TRAINING PROGRAM

## 2023-11-21 PROCEDURE — 1159F PR MEDICATION LIST DOCUMENTED IN MEDICAL RECORD: ICD-10-PCS | Mod: CPTII,S$GLB,, | Performed by: STUDENT IN AN ORGANIZED HEALTH CARE EDUCATION/TRAINING PROGRAM

## 2023-11-21 PROCEDURE — 99204 OFFICE O/P NEW MOD 45 MIN: CPT | Mod: 25,S$GLB,, | Performed by: STUDENT IN AN ORGANIZED HEALTH CARE EDUCATION/TRAINING PROGRAM

## 2023-11-21 PROCEDURE — 99204 PR OFFICE/OUTPT VISIT, NEW, LEVL IV, 45-59 MIN: ICD-10-PCS | Mod: 25,S$GLB,, | Performed by: STUDENT IN AN ORGANIZED HEALTH CARE EDUCATION/TRAINING PROGRAM

## 2023-11-21 PROCEDURE — 3008F BODY MASS INDEX DOCD: CPT | Mod: CPTII,S$GLB,, | Performed by: STUDENT IN AN ORGANIZED HEALTH CARE EDUCATION/TRAINING PROGRAM

## 2023-11-21 PROCEDURE — 99999 PR PBB SHADOW E&M-EST. PATIENT-LVL III: ICD-10-PCS | Mod: PBBFAC,,, | Performed by: STUDENT IN AN ORGANIZED HEALTH CARE EDUCATION/TRAINING PROGRAM

## 2023-11-21 PROCEDURE — 3008F PR BODY MASS INDEX (BMI) DOCUMENTED: ICD-10-PCS | Mod: CPTII,S$GLB,, | Performed by: STUDENT IN AN ORGANIZED HEALTH CARE EDUCATION/TRAINING PROGRAM

## 2023-11-21 PROCEDURE — 1159F MED LIST DOCD IN RCRD: CPT | Mod: CPTII,S$GLB,, | Performed by: STUDENT IN AN ORGANIZED HEALTH CARE EDUCATION/TRAINING PROGRAM

## 2023-11-21 PROCEDURE — 3044F PR MOST RECENT HEMOGLOBIN A1C LEVEL <7.0%: ICD-10-PCS | Mod: CPTII,S$GLB,, | Performed by: STUDENT IN AN ORGANIZED HEALTH CARE EDUCATION/TRAINING PROGRAM

## 2023-11-21 PROCEDURE — 69210 REMOVE IMPACTED EAR WAX UNI: CPT | Mod: S$GLB,,, | Performed by: STUDENT IN AN ORGANIZED HEALTH CARE EDUCATION/TRAINING PROGRAM

## 2023-11-21 PROCEDURE — 99999 PR PBB SHADOW E&M-EST. PATIENT-LVL III: CPT | Mod: PBBFAC,,, | Performed by: STUDENT IN AN ORGANIZED HEALTH CARE EDUCATION/TRAINING PROGRAM

## 2023-11-21 PROCEDURE — 69210 PR REMOVAL IMPACTED CERUMEN REQUIRING INSTRUMENTATION, UNILATERAL: ICD-10-PCS | Mod: S$GLB,,, | Performed by: STUDENT IN AN ORGANIZED HEALTH CARE EDUCATION/TRAINING PROGRAM

## 2023-11-21 RX ORDER — FLUTICASONE PROPIONATE 50 MCG
1 SPRAY, SUSPENSION (ML) NASAL DAILY
Qty: 16 G | Refills: 0 | Status: SHIPPED | OUTPATIENT
Start: 2023-11-21

## 2023-11-21 RX ORDER — AMOXICILLIN AND CLAVULANATE POTASSIUM 875; 125 MG/1; MG/1
1 TABLET, FILM COATED ORAL EVERY 12 HOURS
Qty: 20 TABLET | Refills: 0 | Status: SHIPPED | OUTPATIENT
Start: 2023-11-21 | End: 2023-12-01

## 2023-11-21 RX ORDER — CIPROFLOXACIN HYDROCHLORIDE 3 MG/ML
4 SOLUTION/ DROPS OPHTHALMIC 2 TIMES DAILY
Qty: 10 ML | Refills: 0 | Status: SHIPPED | OUTPATIENT
Start: 2023-11-21 | End: 2023-11-26

## 2023-11-21 NOTE — PROGRESS NOTES
Chief complaint:   Chief Complaint   Patient presents with    Other     Ear pain ongoing X 3 days in R ear.          Referring Provider:  Self, Aaareferral  No address on file    History of Present Illness:     Mr. Mathur is a 48 y.o. male presenting for evaluation of right ear pain. Onset of this chief complaint was about 2 months ago, but worse recently after a flight.  Additional symptoms that also have been associated are muffled hearing, ear pressure, some pulsing sensation. The patient denies drainage.  Treatment has included none.    Does use ear buds.      The patient denies significant hearing loss risk factors, ototoxic medication exposure, chronic vestibular suppressant use, head/ facial/ roland trauma, and otologic surgery.        History        Past Medical History: No past medical history on file.          Past Surgical History:  Past Surgical History:   Procedure Laterality Date    SPINE SURGERY     .         Medications: Medication list was reviewed. He  has a current medication list which includes the following prescription(s): acetaminophen, ergocalciferol, folic acid, hydroxychloroquine, methotrexate, amoxicillin-clavulanate 875-125mg, ciprofloxacin hcl, and fluticasone propionate.         Allergies: Review of patient's allergies indicates:  No Known Allergies         Family history: family history includes Cancer (age of onset: 70) in his father; Hypertension in his father.         Social History          Alcohol use:  has no history on file for alcohol use.            Tobacco:  reports that he has never smoked. He has never used smokeless tobacco.         Please see the patient's intake form for full details of past medical history, past surgical history, family history, social history and review of systems. ?This information was reviewed by me and noted.      Physical Examination     General: Well developed, well nourished, well hydrated. Verbal with a strong voice and not dysphonic.      Head/Face: Normocephalic, atraumatic. No scars or lesions. Facial musculature equal.     Eyes: No scleral icterus or conjunctival hemorrhage. EOMI. PERRLA.     Ears:     Right ear: No gross deformity. See procedure     Left ear: No gross deformity. EAC is clear of debris and erythema. The TM is intact with a pneumatized middle ear. No signs of retraction, fluid or infection.     Hearing: grossly intact    Nose: No gross deformity or lesions. No purulent discharge. No significant NSD.      Mouth/Oropharynx: Lips without any lesions. No mucosal lesions within the oropharynx. No tonsillar exudate or lesions. Pharyngeal walls symmetrical. Uvula midline. Tongue midline without lesions.     Neck: Trachea midline. No masses. No thyromegaly or nodules palpated.     Lymphatic: No lymphadenopathy in the neck.     Extremities: No cyanosis. Warm and well-perfused.     Skin: No scars or lesions on face or neck.      Neurologic: Moving all extremities without gross abnormality.CN II-XII grossly intact. House-Brackmann 1/6. No signs of nystagmus.      Psych: Alert and oriented to person, place, and time with an appropriate mood and affect.     Procedure: ear microscopy with removal of cerumen    Description: The patient was in agreement with the examination and debridement of the ears. Removal of the cerumen required use of an operating microscope and multiple micro-instruments.     With the patient in the supine position, we used the operating microscope to examine both ears with the appropriate sized ear speculum.  A variety of sterile, micro-instruments were utilized to remove the cerumen atraumatically.  I performed the procedure which required a significant amount of time and effort. The tympanic membrane was then well visualized.  The patient tolerated the procedure well and there were no complications.    Findings:   Right ear had significant wax, the EAC was discharge present and inflamed, and the tympanic membrane   intact with purulent effusion .        Data review:    Review of records:      I reviewed records from the referring provider's office visits.  These describe the history, workup, and/or treatment of this problem thus far.        Assessment/Plan:      No diagnosis found.     Daily flonase  Afrin x 3-5d  Augmentin x 10d  Cilxan x 7d  Return to clinic 1m w/audio prior, consider tube if no improvement        Juan Landis MD  [facility]   [facility] [address]  [place] [id number]  P: [phone]  F: [phone]

## 2023-12-14 ENCOUNTER — PATIENT MESSAGE (OUTPATIENT)
Dept: INTERNAL MEDICINE | Facility: CLINIC | Age: 48
End: 2023-12-14
Payer: COMMERCIAL

## 2023-12-23 DIAGNOSIS — M32.9 SYSTEMIC LUPUS ERYTHEMATOSUS, UNSPECIFIED SLE TYPE, UNSPECIFIED ORGAN INVOLVEMENT STATUS: ICD-10-CM

## 2023-12-28 ENCOUNTER — LAB VISIT (OUTPATIENT)
Dept: LAB | Facility: HOSPITAL | Age: 48
End: 2023-12-28
Attending: STUDENT IN AN ORGANIZED HEALTH CARE EDUCATION/TRAINING PROGRAM
Payer: COMMERCIAL

## 2023-12-28 ENCOUNTER — OFFICE VISIT (OUTPATIENT)
Dept: RHEUMATOLOGY | Facility: CLINIC | Age: 48
End: 2023-12-28
Payer: COMMERCIAL

## 2023-12-28 VITALS
HEART RATE: 81 BPM | WEIGHT: 210.88 LBS | BODY MASS INDEX: 30.19 KG/M2 | SYSTOLIC BLOOD PRESSURE: 141 MMHG | DIASTOLIC BLOOD PRESSURE: 87 MMHG | HEIGHT: 70 IN

## 2023-12-28 DIAGNOSIS — D84.821 IMMUNODEFICIENCY DUE TO TREATMENT WITH IMMUNOSUPPRESSIVE MEDICATION: ICD-10-CM

## 2023-12-28 DIAGNOSIS — E55.9 VITAMIN D DEFICIENCY: ICD-10-CM

## 2023-12-28 DIAGNOSIS — M32.9 SYSTEMIC LUPUS ERYTHEMATOSUS, UNSPECIFIED SLE TYPE, UNSPECIFIED ORGAN INVOLVEMENT STATUS: ICD-10-CM

## 2023-12-28 DIAGNOSIS — Z79.899 IMMUNODEFICIENCY DUE TO TREATMENT WITH IMMUNOSUPPRESSIVE MEDICATION: ICD-10-CM

## 2023-12-28 DIAGNOSIS — Z22.7 LATENT TUBERCULOSIS BY BLOOD TEST: ICD-10-CM

## 2023-12-28 DIAGNOSIS — M32.9 SYSTEMIC LUPUS ERYTHEMATOSUS, UNSPECIFIED SLE TYPE, UNSPECIFIED ORGAN INVOLVEMENT STATUS: Primary | ICD-10-CM

## 2023-12-28 LAB
ALBUMIN SERPL BCP-MCNC: 4 G/DL (ref 3.5–5.2)
ALP SERPL-CCNC: 79 U/L (ref 55–135)
ALT SERPL W/O P-5'-P-CCNC: 43 U/L (ref 10–44)
ANION GAP SERPL CALC-SCNC: 8 MMOL/L (ref 8–16)
ANISOCYTOSIS BLD QL SMEAR: SLIGHT
AST SERPL-CCNC: 38 U/L (ref 10–40)
BASOPHILS # BLD AUTO: 0.04 K/UL (ref 0–0.2)
BASOPHILS NFR BLD: 1.3 % (ref 0–1.9)
BILIRUB SERPL-MCNC: 0.5 MG/DL (ref 0.1–1)
BILIRUB UR QL STRIP: NEGATIVE
BUN SERPL-MCNC: 13 MG/DL (ref 6–20)
C3 SERPL-MCNC: 118 MG/DL (ref 50–180)
C4 SERPL-MCNC: 25 MG/DL (ref 11–44)
CALCIUM SERPL-MCNC: 9.6 MG/DL (ref 8.7–10.5)
CHLORIDE SERPL-SCNC: 103 MMOL/L (ref 95–110)
CLARITY UR REFRACT.AUTO: CLEAR
CO2 SERPL-SCNC: 28 MMOL/L (ref 23–29)
COLOR UR AUTO: YELLOW
CREAT SERPL-MCNC: 1.2 MG/DL (ref 0.5–1.4)
CREAT UR-MCNC: 140 MG/DL (ref 23–375)
CRP SERPL-MCNC: 5.9 MG/L (ref 0–8.2)
DIFFERENTIAL METHOD BLD: ABNORMAL
EOSINOPHIL # BLD AUTO: 0.2 K/UL (ref 0–0.5)
EOSINOPHIL NFR BLD: 4.7 % (ref 0–8)
ERYTHROCYTE [DISTWIDTH] IN BLOOD BY AUTOMATED COUNT: 14.1 % (ref 11.5–14.5)
ERYTHROCYTE [SEDIMENTATION RATE] IN BLOOD BY WESTERGREN METHOD: 12 MM/HR (ref 0–10)
EST. GFR  (NO RACE VARIABLE): >60 ML/MIN/1.73 M^2
GLUCOSE SERPL-MCNC: 91 MG/DL (ref 70–110)
GLUCOSE UR QL STRIP: NEGATIVE
HCT VFR BLD AUTO: 42.5 % (ref 40–54)
HGB BLD-MCNC: 13.5 G/DL (ref 14–18)
HGB UR QL STRIP: NEGATIVE
IMM GRANULOCYTES # BLD AUTO: 0.01 K/UL (ref 0–0.04)
IMM GRANULOCYTES NFR BLD AUTO: 0.3 % (ref 0–0.5)
KETONES UR QL STRIP: NEGATIVE
LEUKOCYTE ESTERASE UR QL STRIP: NEGATIVE
LYMPHOCYTES # BLD AUTO: 1.7 K/UL (ref 1–4.8)
LYMPHOCYTES NFR BLD: 51.6 % (ref 18–48)
MCH RBC QN AUTO: 30 PG (ref 27–31)
MCHC RBC AUTO-ENTMCNC: 31.8 G/DL (ref 32–36)
MCV RBC AUTO: 94 FL (ref 82–98)
MONOCYTES # BLD AUTO: 0.4 K/UL (ref 0.3–1)
MONOCYTES NFR BLD: 11.6 % (ref 4–15)
NEUTROPHILS # BLD AUTO: 1 K/UL (ref 1.8–7.7)
NEUTROPHILS NFR BLD: 30.5 % (ref 38–73)
NITRITE UR QL STRIP: NEGATIVE
NRBC BLD-RTO: 0 /100 WBC
PH UR STRIP: 5 [PH] (ref 5–8)
PLATELET # BLD AUTO: 199 K/UL (ref 150–450)
PLATELET BLD QL SMEAR: ABNORMAL
PMV BLD AUTO: 12.2 FL (ref 9.2–12.9)
POTASSIUM SERPL-SCNC: 4.3 MMOL/L (ref 3.5–5.1)
PROT SERPL-MCNC: 7.6 G/DL (ref 6–8.4)
PROT UR QL STRIP: NEGATIVE
PROT UR-MCNC: <7 MG/DL (ref 0–15)
PROT/CREAT UR: NORMAL MG/G{CREAT} (ref 0–0.2)
RBC # BLD AUTO: 4.5 M/UL (ref 4.6–6.2)
SODIUM SERPL-SCNC: 139 MMOL/L (ref 136–145)
SP GR UR STRIP: 1.02 (ref 1–1.03)
URN SPEC COLLECT METH UR: NORMAL
WBC # BLD AUTO: 3.2 K/UL (ref 3.9–12.7)

## 2023-12-28 PROCEDURE — 80053 COMPREHEN METABOLIC PANEL: CPT | Performed by: STUDENT IN AN ORGANIZED HEALTH CARE EDUCATION/TRAINING PROGRAM

## 2023-12-28 PROCEDURE — 85651 RBC SED RATE NONAUTOMATED: CPT | Performed by: STUDENT IN AN ORGANIZED HEALTH CARE EDUCATION/TRAINING PROGRAM

## 2023-12-28 PROCEDURE — 86160 COMPLEMENT ANTIGEN: CPT | Performed by: STUDENT IN AN ORGANIZED HEALTH CARE EDUCATION/TRAINING PROGRAM

## 2023-12-28 PROCEDURE — 1160F RVW MEDS BY RX/DR IN RCRD: CPT | Mod: CPTII,S$GLB,, | Performed by: STUDENT IN AN ORGANIZED HEALTH CARE EDUCATION/TRAINING PROGRAM

## 2023-12-28 PROCEDURE — 99215 PR OFFICE/OUTPT VISIT, EST, LEVL V, 40-54 MIN: ICD-10-PCS | Mod: S$GLB,,, | Performed by: STUDENT IN AN ORGANIZED HEALTH CARE EDUCATION/TRAINING PROGRAM

## 2023-12-28 PROCEDURE — 84156 ASSAY OF PROTEIN URINE: CPT | Performed by: STUDENT IN AN ORGANIZED HEALTH CARE EDUCATION/TRAINING PROGRAM

## 2023-12-28 PROCEDURE — 36415 COLL VENOUS BLD VENIPUNCTURE: CPT | Performed by: STUDENT IN AN ORGANIZED HEALTH CARE EDUCATION/TRAINING PROGRAM

## 2023-12-28 PROCEDURE — 3008F PR BODY MASS INDEX (BMI) DOCUMENTED: ICD-10-PCS | Mod: CPTII,S$GLB,, | Performed by: STUDENT IN AN ORGANIZED HEALTH CARE EDUCATION/TRAINING PROGRAM

## 2023-12-28 PROCEDURE — 1159F MED LIST DOCD IN RCRD: CPT | Mod: CPTII,S$GLB,, | Performed by: STUDENT IN AN ORGANIZED HEALTH CARE EDUCATION/TRAINING PROGRAM

## 2023-12-28 PROCEDURE — 99999 PR PBB SHADOW E&M-EST. PATIENT-LVL III: CPT | Mod: PBBFAC,,, | Performed by: STUDENT IN AN ORGANIZED HEALTH CARE EDUCATION/TRAINING PROGRAM

## 2023-12-28 PROCEDURE — 3079F PR MOST RECENT DIASTOLIC BLOOD PRESSURE 80-89 MM HG: ICD-10-PCS | Mod: CPTII,S$GLB,, | Performed by: STUDENT IN AN ORGANIZED HEALTH CARE EDUCATION/TRAINING PROGRAM

## 2023-12-28 PROCEDURE — 3044F PR MOST RECENT HEMOGLOBIN A1C LEVEL <7.0%: ICD-10-PCS | Mod: CPTII,S$GLB,, | Performed by: STUDENT IN AN ORGANIZED HEALTH CARE EDUCATION/TRAINING PROGRAM

## 2023-12-28 PROCEDURE — 1160F PR REVIEW ALL MEDS BY PRESCRIBER/CLIN PHARMACIST DOCUMENTED: ICD-10-PCS | Mod: CPTII,S$GLB,, | Performed by: STUDENT IN AN ORGANIZED HEALTH CARE EDUCATION/TRAINING PROGRAM

## 2023-12-28 PROCEDURE — 86140 C-REACTIVE PROTEIN: CPT | Performed by: STUDENT IN AN ORGANIZED HEALTH CARE EDUCATION/TRAINING PROGRAM

## 2023-12-28 PROCEDURE — 99999 PR PBB SHADOW E&M-EST. PATIENT-LVL III: ICD-10-PCS | Mod: PBBFAC,,, | Performed by: STUDENT IN AN ORGANIZED HEALTH CARE EDUCATION/TRAINING PROGRAM

## 2023-12-28 PROCEDURE — 3077F SYST BP >= 140 MM HG: CPT | Mod: CPTII,S$GLB,, | Performed by: STUDENT IN AN ORGANIZED HEALTH CARE EDUCATION/TRAINING PROGRAM

## 2023-12-28 PROCEDURE — 3079F DIAST BP 80-89 MM HG: CPT | Mod: CPTII,S$GLB,, | Performed by: STUDENT IN AN ORGANIZED HEALTH CARE EDUCATION/TRAINING PROGRAM

## 2023-12-28 PROCEDURE — 85025 COMPLETE CBC W/AUTO DIFF WBC: CPT | Performed by: STUDENT IN AN ORGANIZED HEALTH CARE EDUCATION/TRAINING PROGRAM

## 2023-12-28 PROCEDURE — 3044F HG A1C LEVEL LT 7.0%: CPT | Mod: CPTII,S$GLB,, | Performed by: STUDENT IN AN ORGANIZED HEALTH CARE EDUCATION/TRAINING PROGRAM

## 2023-12-28 PROCEDURE — 3077F PR MOST RECENT SYSTOLIC BLOOD PRESSURE >= 140 MM HG: ICD-10-PCS | Mod: CPTII,S$GLB,, | Performed by: STUDENT IN AN ORGANIZED HEALTH CARE EDUCATION/TRAINING PROGRAM

## 2023-12-28 PROCEDURE — 86160 COMPLEMENT ANTIGEN: CPT | Mod: 59 | Performed by: STUDENT IN AN ORGANIZED HEALTH CARE EDUCATION/TRAINING PROGRAM

## 2023-12-28 PROCEDURE — 99215 OFFICE O/P EST HI 40 MIN: CPT | Mod: S$GLB,,, | Performed by: STUDENT IN AN ORGANIZED HEALTH CARE EDUCATION/TRAINING PROGRAM

## 2023-12-28 PROCEDURE — 81003 URINALYSIS AUTO W/O SCOPE: CPT | Performed by: STUDENT IN AN ORGANIZED HEALTH CARE EDUCATION/TRAINING PROGRAM

## 2023-12-28 PROCEDURE — 1159F PR MEDICATION LIST DOCUMENTED IN MEDICAL RECORD: ICD-10-PCS | Mod: CPTII,S$GLB,, | Performed by: STUDENT IN AN ORGANIZED HEALTH CARE EDUCATION/TRAINING PROGRAM

## 2023-12-28 PROCEDURE — 3008F BODY MASS INDEX DOCD: CPT | Mod: CPTII,S$GLB,, | Performed by: STUDENT IN AN ORGANIZED HEALTH CARE EDUCATION/TRAINING PROGRAM

## 2023-12-28 PROCEDURE — 86225 DNA ANTIBODY NATIVE: CPT | Performed by: STUDENT IN AN ORGANIZED HEALTH CARE EDUCATION/TRAINING PROGRAM

## 2023-12-28 RX ORDER — ERGOCALCIFEROL 1.25 MG/1
50000 CAPSULE ORAL
Qty: 12 CAPSULE | Refills: 0 | OUTPATIENT
Start: 2023-12-28

## 2023-12-28 RX ORDER — FOLIC ACID 1 MG/1
1 TABLET ORAL DAILY
Qty: 90 TABLET | Refills: 3 | Status: SHIPPED | OUTPATIENT
Start: 2023-12-28 | End: 2024-12-27

## 2023-12-28 RX ORDER — HYDROXYCHLOROQUINE SULFATE 200 MG/1
400 TABLET, FILM COATED ORAL DAILY
Qty: 180 TABLET | Refills: 3 | Status: SHIPPED | OUTPATIENT
Start: 2023-12-28

## 2023-12-28 RX ORDER — ERGOCALCIFEROL 1.25 MG/1
50000 CAPSULE ORAL
Qty: 12 CAPSULE | Refills: 3 | Status: SHIPPED | OUTPATIENT
Start: 2023-12-28

## 2023-12-28 RX ORDER — METHOTREXATE 2.5 MG/1
25 TABLET ORAL
Qty: 120 TABLET | Refills: 3 | Status: SHIPPED | OUTPATIENT
Start: 2023-12-28 | End: 2024-12-27

## 2023-12-28 NOTE — PROGRESS NOTES
Subjective:      Patient ID: Jone Mathur is a 48 y.o. male.    Chief Complaint: SLE    HPI:   Patient is a 49 yo M who presents for Rheumatology follow up for SLE. This is my first time seeing him. He was seeing Dr. Hooper who has now moved away. Patient had issues for many years. Started with Raynaud's in the fingers and joint pain swelling in the elbows and fingers since 2017. Went to several Rheumatologists (Dr. Francisco, Dr. Villalpando) and had some lab abnormalities (Noted to have SARA+ 1:1280, Sm+, RNP+, SSA+, cryoglobulins+) but was not diagnosed with a connective tissue disease. He took NSAIDs for joint pain and swelling.    Repeat labs showed SARA >2560, +Sm, SSA, DNA, RNP; RF/CCP negative; cmp normal; CBC, H/H 12/37, , CRP 18.3, U/A 1+OB but no RBC and no protein    Then he saw Dr. Álvaro Ferrer in [place] in 10/2021 and was diagnosed with lupus. He then established with Dr. Hooper and continued to follow up with Dr. Hooper.    He got night sweats October-December 2020.    TB Gold positive 9/2022 and he completed treatment for latent TB in 2023.    Started Plaquenl 400 mg daily in 10/2021  Added MTX 25 mg weekly in split dosing in 2/2022.    Today he reports he is doing well. 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 He takes precautions to keep his skin covered and use sunscreen. He sometimes gets pain in the knees but doesn't bother him to a great extent. No major issues with joint pain and swelling since he started the methotrexate.    No skin rashes, malar rash, photosensitivity   No telangiectasias   No calcinosis   No psoriasis   No patchy alopecia   No oral or nasal ulcers   No dry eyes or dry mouth   No pleurisy, chest pain, dyspnea, cough  No dysphagia, diplopia, dysphonia  No muscle weakness   No nausea, vomiting, diarrhea, constipation   No acid reflux  + Raynaud's  No digital ulcers   No cytopenias   No renal issues   No blood clots   No fever, chills, night sweats, weight  "loss, or loss of appetite   No pregnancy losses, pre-term deliveries, or pregnancy complications   No new onset headaches   No recurrent conjunctivitis, uveitis, scleritis, or episcleritis   No chronic or bloody diarrhea. No Ulcerative Colitis or Chron's (IBD)  No vaginal or penile and urethral discharge/STDs/ulcers   No unexplained lymphadenopathy  No parotitis   No seizures, strokes, psychosis  No sclerodactyly  No puffy hands  No perioral tightness     Social Hx: non smoker, no alcohol use. Works in construction.      Review of Systems   Constitutional:  Negative for chills, fever and weight loss.   Respiratory:  Negative for cough and shortness of breath.    Cardiovascular:  Negative for chest pain.   Gastrointestinal:  Negative for diarrhea, nausea and vomiting.   Genitourinary:  Negative for dysuria.   Musculoskeletal:  Negative for joint pain and myalgias.   Skin:  Negative for rash.   Neurological:  Negative for tingling and weakness.        Objective:   BP (!) 141/87   Pulse 81   Ht 5' 10" (1.778 m)   Wt 95.7 kg (210 lb 13.9 oz)   BMI 30.26 kg/m²   Physical Exam  Constitutional:       General: He is not in acute distress.     Appearance: Normal appearance.   HENT:      Head: Normocephalic and atraumatic.      Mouth/Throat:      Mouth: Mucous membranes are moist.      Pharynx: Oropharynx is clear.   Cardiovascular:      Rate and Rhythm: Normal rate and regular rhythm.   Pulmonary:      Effort: Pulmonary effort is normal.      Breath sounds: Normal breath sounds.   Abdominal:      Palpations: Abdomen is soft.      Tenderness: There is no abdominal tenderness.   Musculoskeletal:         General: No tenderness.      Cervical back: Normal range of motion. No tenderness.      Comments: Slight effusion of right knee. No other joint synovitis or effusions.   Skin:     General: Skin is warm and dry.   Neurological:      Mental Status: He is alert and oriented to person, place, and time. Mental status is at " baseline.           Assessment:     1. Systemic lupus erythematosus, unspecified SLE type, unspecified organ involvement status    2. Immunodeficiency due to treatment with immunosuppressive medication    3. Latent tuberculosis by blood test    4. Vitamin D deficiency          Plan:     Problem List Items Addressed This Visit          Unprioritized    Latent tuberculosis by blood test     Other Visit Diagnoses       Systemic lupus erythematosus, unspecified SLE type, unspecified organ involvement status    -  Primary    Relevant Medications    hydroxychloroquine (PLAQUENIL) 200 mg tablet    methotrexate 2.5 MG Tab    folic acid (FOLVITE) 1 MG tablet    ergocalciferol (ERGOCALCIFEROL) 50,000 unit Cap    Other Relevant Orders    Anti-DNA Ab, Double-Stranded    C3 Complement    C4 Complement    CBC Auto Differential    Comprehensive Metabolic Panel    C-Reactive Protein    Protein/Creatinine Ratio, Urine    Sedimentation rate    Urinalysis    Immunodeficiency due to treatment with immunosuppressive medication        Relevant Orders    Anti-DNA Ab, Double-Stranded    C3 Complement    C4 Complement    CBC Auto Differential    Comprehensive Metabolic Panel    C-Reactive Protein    Protein/Creatinine Ratio, Urine    Sedimentation rate    Urinalysis    Vitamin D deficiency                Patient is a 49 yo M who presents for Rheumatology follow up for SLE (SARA+, dsDNA+, SSA+, Sm+, RNP+).    Problems:  SLE manifesting as arthritis  Raynaud's  Vitamin D deficiency    SARA+ 1:2560  dsDNA+, SSA+, Sm+, RNP+  Complements always normal  Never had proteinuria  At one point had +cryoglobulins    Hb baseline 12-13.  Some WBC counts around 3.7, never less than 3 in our system.  No thrombocytopenia.  Cr/GFR normal    RF and CCP negative    Vitamin D 8 in 2/2023    Normal Plaquenil eye exam 6/6/2023.    SLE and Raynaud's well controlled on current regimen.      Plan:  SLE labs today  Refilled meds  Plaquenil 400 mg daily  Methotrexate 25  mg weekly in split dosing  Folic acid 1 mg daily  Vitamin D 50,000 units weekly  Counseled on UV exposure precautions for SLE  Counseled on core warming precautions for Raynaud's  Labs every 3 months in [place]  Follow up every 3 months at [facility]      Follow up in about 3 months (around 3/28/2024).       I spent a total of 60 minutes on the day of the visit.  This includes face to face time and non-face to face time preparing to see the patient (eg, review of tests), obtaining and/or reviewing separately obtained history, documenting clinical information in the electronic or other health record, independently interpreting results and communicating results to the patient/family/caregiver, or care coordinator.

## 2023-12-29 LAB — DSDNA AB SER-ACNC: NORMAL [IU]/ML

## 2024-01-01 PROBLEM — Z00.00 HEALTHCARE MAINTENANCE: Status: RESOLVED | Noted: 2023-09-29 | Resolved: 2024-01-01

## 2024-01-01 PROBLEM — Z00.00 ANNUAL PHYSICAL EXAM: Status: RESOLVED | Noted: 2023-09-29 | Resolved: 2024-01-01

## 2024-03-18 ENCOUNTER — TELEPHONE (OUTPATIENT)
Dept: RHEUMATOLOGY | Facility: CLINIC | Age: 49
End: 2024-03-18
Payer: COMMERCIAL

## 2024-03-18 DIAGNOSIS — Z79.899 IMMUNODEFICIENCY DUE TO TREATMENT WITH IMMUNOSUPPRESSIVE MEDICATION: ICD-10-CM

## 2024-03-18 DIAGNOSIS — D84.821 IMMUNODEFICIENCY DUE TO TREATMENT WITH IMMUNOSUPPRESSIVE MEDICATION: ICD-10-CM

## 2024-03-18 DIAGNOSIS — M32.9 SYSTEMIC LUPUS ERYTHEMATOSUS, UNSPECIFIED SLE TYPE, UNSPECIFIED ORGAN INVOLVEMENT STATUS: Primary | ICD-10-CM

## 2024-03-24 ENCOUNTER — PATIENT MESSAGE (OUTPATIENT)
Dept: INTERNAL MEDICINE | Facility: CLINIC | Age: 49
End: 2024-03-24
Payer: COMMERCIAL

## 2024-03-25 LAB
ALBUMIN SERPL-MCNC: 4.1 G/DL (ref 3.6–5.1)
ALBUMIN/GLOB SERPL: 1.4 (CALC) (ref 1–2.5)
ALP SERPL-CCNC: 72 U/L (ref 36–130)
ALT SERPL-CCNC: 22 U/L (ref 9–46)
AST SERPL-CCNC: 23 U/L (ref 10–40)
BASOPHILS # BLD AUTO: 32 CELLS/UL (ref 0–200)
BASOPHILS NFR BLD AUTO: 0.7 %
BILIRUB SERPL-MCNC: 0.9 MG/DL (ref 0.2–1.2)
BUN SERPL-MCNC: 15 MG/DL (ref 7–25)
BUN/CREAT SERPL: NORMAL (CALC) (ref 6–22)
C3 SERPL-MCNC: 122 MG/DL (ref 82–185)
C4 SERPL-MCNC: 27 MG/DL (ref 15–53)
CALCIUM SERPL-MCNC: 9.1 MG/DL (ref 8.6–10.3)
CHLORIDE SERPL-SCNC: 104 MMOL/L (ref 98–110)
CO2 SERPL-SCNC: 28 MMOL/L (ref 20–32)
CREAT SERPL-MCNC: 0.94 MG/DL (ref 0.6–1.29)
CRP SERPL-MCNC: 35.8 MG/L
DSDNA AB SER-ACNC: 29 IU/ML
EGFR: 100 ML/MIN/1.73M2
EOSINOPHIL # BLD AUTO: 131 CELLS/UL (ref 15–500)
EOSINOPHIL NFR BLD AUTO: 2.9 %
ERYTHROCYTE [DISTWIDTH] IN BLOOD BY AUTOMATED COUNT: 13.2 % (ref 11–15)
ERYTHROCYTE [SEDIMENTATION RATE] IN BLOOD BY WESTERGREN METHOD: 22 MM/H
GLOBULIN SER CALC-MCNC: 2.9 G/DL (CALC) (ref 1.9–3.7)
GLUCOSE SERPL-MCNC: 93 MG/DL (ref 65–139)
HCT VFR BLD AUTO: 38.8 % (ref 38.5–50)
HGB BLD-MCNC: 12.5 G/DL (ref 13.2–17.1)
LYMPHOCYTES # BLD AUTO: 1377 CELLS/UL (ref 850–3900)
LYMPHOCYTES NFR BLD AUTO: 30.6 %
MCH RBC QN AUTO: 29.8 PG (ref 27–33)
MCHC RBC AUTO-ENTMCNC: 32.2 G/DL (ref 32–36)
MCV RBC AUTO: 92.4 FL (ref 80–100)
MONOCYTES # BLD AUTO: 540 CELLS/UL (ref 200–950)
MONOCYTES NFR BLD AUTO: 12 %
NEUTROPHILS # BLD AUTO: 2421 CELLS/UL (ref 1500–7800)
NEUTROPHILS NFR BLD AUTO: 53.8 %
PLATELET # BLD AUTO: 185 THOUSAND/UL (ref 140–400)
PMV BLD REES-ECKER: 12 FL (ref 7.5–12.5)
POTASSIUM SERPL-SCNC: 4.3 MMOL/L (ref 3.5–5.3)
PROT SERPL-MCNC: 7 G/DL (ref 6.1–8.1)
RBC # BLD AUTO: 4.2 MILLION/UL (ref 4.2–5.8)
SODIUM SERPL-SCNC: 139 MMOL/L (ref 135–146)
WBC # BLD AUTO: 4.5 THOUSAND/UL (ref 3.8–10.8)

## 2024-03-26 ENCOUNTER — OFFICE VISIT (OUTPATIENT)
Dept: INTERNAL MEDICINE | Facility: CLINIC | Age: 49
End: 2024-03-26
Payer: COMMERCIAL

## 2024-03-26 VITALS
HEART RATE: 74 BPM | WEIGHT: 204.38 LBS | SYSTOLIC BLOOD PRESSURE: 137 MMHG | BODY MASS INDEX: 29.26 KG/M2 | HEIGHT: 70 IN | DIASTOLIC BLOOD PRESSURE: 84 MMHG

## 2024-03-26 DIAGNOSIS — D84.821 IMMUNODEFICIENCY DUE TO TREATMENT WITH IMMUNOSUPPRESSIVE MEDICATION: ICD-10-CM

## 2024-03-26 DIAGNOSIS — Z79.899 IMMUNODEFICIENCY DUE TO TREATMENT WITH IMMUNOSUPPRESSIVE MEDICATION: ICD-10-CM

## 2024-03-26 DIAGNOSIS — R13.10 DYSPHAGIA, UNSPECIFIED TYPE: ICD-10-CM

## 2024-03-26 DIAGNOSIS — M32.9 SYSTEMIC LUPUS ERYTHEMATOSUS, UNSPECIFIED SLE TYPE, UNSPECIFIED ORGAN INVOLVEMENT STATUS: ICD-10-CM

## 2024-03-26 DIAGNOSIS — K21.00 GASTROESOPHAGEAL REFLUX DISEASE WITH ESOPHAGITIS, UNSPECIFIED WHETHER HEMORRHAGE: Primary | ICD-10-CM

## 2024-03-26 DIAGNOSIS — D64.9 NORMOCYTIC ANEMIA: ICD-10-CM

## 2024-03-26 PROBLEM — Z79.60 IMMUNODEFICIENCY DUE TO TREATMENT WITH IMMUNOSUPPRESSIVE MEDICATION: Status: ACTIVE | Noted: 2024-03-26

## 2024-03-26 PROBLEM — T45.1X5A IMMUNODEFICIENCY DUE TO TREATMENT WITH IMMUNOSUPPRESSIVE MEDICATION: Status: ACTIVE | Noted: 2024-03-26

## 2024-03-26 PROCEDURE — 3008F BODY MASS INDEX DOCD: CPT | Mod: CPTII,S$GLB,, | Performed by: STUDENT IN AN ORGANIZED HEALTH CARE EDUCATION/TRAINING PROGRAM

## 2024-03-26 PROCEDURE — 1160F RVW MEDS BY RX/DR IN RCRD: CPT | Mod: CPTII,S$GLB,, | Performed by: STUDENT IN AN ORGANIZED HEALTH CARE EDUCATION/TRAINING PROGRAM

## 2024-03-26 PROCEDURE — 99214 OFFICE O/P EST MOD 30 MIN: CPT | Mod: S$GLB,,, | Performed by: STUDENT IN AN ORGANIZED HEALTH CARE EDUCATION/TRAINING PROGRAM

## 2024-03-26 PROCEDURE — 1159F MED LIST DOCD IN RCRD: CPT | Mod: CPTII,S$GLB,, | Performed by: STUDENT IN AN ORGANIZED HEALTH CARE EDUCATION/TRAINING PROGRAM

## 2024-03-26 PROCEDURE — 99999 PR PBB SHADOW E&M-EST. PATIENT-LVL IV: CPT | Mod: PBBFAC,,, | Performed by: STUDENT IN AN ORGANIZED HEALTH CARE EDUCATION/TRAINING PROGRAM

## 2024-03-26 PROCEDURE — 3079F DIAST BP 80-89 MM HG: CPT | Mod: CPTII,S$GLB,, | Performed by: STUDENT IN AN ORGANIZED HEALTH CARE EDUCATION/TRAINING PROGRAM

## 2024-03-26 PROCEDURE — 3075F SYST BP GE 130 - 139MM HG: CPT | Mod: CPTII,S$GLB,, | Performed by: STUDENT IN AN ORGANIZED HEALTH CARE EDUCATION/TRAINING PROGRAM

## 2024-03-26 RX ORDER — PANTOPRAZOLE SODIUM 20 MG/1
20 TABLET, DELAYED RELEASE ORAL DAILY PRN
Qty: 90 TABLET | Refills: 1 | Status: SHIPPED | OUTPATIENT
Start: 2024-03-26 | End: 2025-03-26

## 2024-03-26 NOTE — ASSESSMENT & PLAN NOTE
Chronic, 2/2 SLE (Chronic disease)  Inflammatory markers are slightly elevated today, which could explain the decrease in Hb Vs GI bleeding???  Lab Results   Component Value Date    WBC 4.5 03/22/2024    HGB 12.5 (L) 03/22/2024    HCT 38.8 03/22/2024    MCV 92.4 03/22/2024     03/22/2024       Pending f/u appointment with Rheumatology, we will follow recommendations.

## 2024-03-26 NOTE — PROGRESS NOTES
"Subjective:       Patient ID: Jone Mathur is a 48 y.o. male.    Chief Complaint: Gastroesophageal Reflux    HPI    Jone Mathur is a 48 y.o. male , English speaking, with a history of:  SLE  Latent TB (positive quantiferon) s/p Tx with rifampin x 4 m  GERD    [Local Patient]  Originally from [facility]  Lives in: [facility] [id number]     Patient comes to the clinic complaining of dysphagia.    Patient complaints of 1.5 weeks of heart burn, dysphagia, he is feeling that the food gets stuck in his esophagus, he is feeling pain and "pulling" everything he eats. The problem happens with both solids and liquids but more with solids.    No fever, chills, fatigue, no melena or hematochezia, no nausea or vomiting, no changes in his weight.    He is scheduled to f/u with rheumatology next week for SLE.      Latest PCP visits:      9/29/23 AWV: anemia, arthritis  9/23/22 SLE, establish care    Changes in health or medications: No    Specialists visits and recommendations:        H/o ER or Urgent care visits:   NO    H/o Hospitalizations:  NO    H/o falls: None     Life events / lifestyle:   Nothing new      Most recent / available laboratories reviewed with the patient:    Recent Labs   Lab 09/29/23  1313 12/28/23  1526 03/22/24  1440   WBC 3.44 L 3.20 L 4.5   Hemoglobin 12.9 L 13.5 L 12.5 L   Hematocrit 40.3 42.5 38.8   MCV 94 94 92.4   Platelets 176 199 185       Recent Labs   Lab 10/04/21  1107 02/04/22  0946 04/01/22  1055 08/01/22  1648 09/29/23  1313 12/28/23  1526 03/22/24  1440   Glucose 82 89 89   < > 88 91 93   Sodium 137 138 139   < > 139 139 139   Potassium 4.1 4.4 4.7   < > 3.9 4.3 4.3   BUN 13 13 12   < > 13 13 15   Creatinine 0.8 1.1 1.0   < > 1.1 1.2 0.94   eGFR if non African American >60.0 >60 >60  --   --   --   --    Total Bilirubin 0.6 0.5 0.7   < > 0.7 0.5 0.9   AST 19 22 22   < > 25 38 23   ALT 12 15 14   < > 26 43 22    < > = values in this interval not displayed.       Recent Labs   Lab " "09/29/23  1313   Hemoglobin A1C 5.6       Recent Labs   Lab 10/21/22  1024 09/29/23  1313   Cholesterol 208 H 174   Triglycerides 77 67   HDL 52 48   LDL Cholesterol 140.6 112.6   Non-HDL Cholesterol 156 126       Recent Labs   Lab 10/04/21  1107 09/14/22  1628 09/29/23  1313   TSH  --  1.540 1.951   PSA, Screen 1.4  --  2.1       Recent Labs   Lab 09/14/22  1628   HIV 1/2 Ag/Ab Non-reactive   Hepatitis C Ab Non-reactive         Current medications:    Current Outpatient Medications:     ergocalciferol (ERGOCALCIFEROL) 50,000 unit Cap, Take 1 capsule (50,000 Units total) by mouth every 7 days., Disp: 12 capsule, Rfl: 3    fluticasone propionate (FLONASE) 50 mcg/actuation nasal spray, 1 spray (50 mcg total) by Each Nostril route once daily., Disp: 16 g, Rfl: 0    folic acid (FOLVITE) 1 MG tablet, Take 1 tablet (1 mg total) by mouth once daily., Disp: 90 tablet, Rfl: 3    hydroxychloroquine (PLAQUENIL) 200 mg tablet, Take 2 tablets (400 mg total) by mouth once daily., Disp: 180 tablet, Rfl: 3    methotrexate 2.5 MG Tab, Take 10 tablets (25 mg total) by mouth every 7 days., Disp: 120 tablet, Rfl: 3    pantoprazole (PROTONIX) 20 MG tablet, Take 1 tablet (20 mg total) by mouth daily as needed (gerd)., Disp: 90 tablet, Rfl: 1      Healthcare Maintenance:  Colon cancer screening:         Vaccinations:        Tetanus: no       Pneumonia: 2023       Zoster: n/a       Influenza: no       COVID vaccination: completed    Depression screening: PHQ2 score = 0    Annual Wellness visit approx. Month: MARCH ROS 11-point review of systems done. Negative except for detailed in the HPI.        Objective:      /84   Pulse 74   Ht 5' 10" (1.778 m)   Wt 92.7 kg (204 lb 5.9 oz)   BMI 29.32 kg/m²      Physical Exam   General appearance: Good general aspect, NAD, conversant   Eyes and HEENT: Normal sclerae, moist mucous membranes, no thyromegaly or lymphadenopathy  Respiratory: No work of breathing, clear to auscultation " bilaterally. No rales, rhonchi, wheezing, or rubs.  Cardiovascular: PMI not displaced. RRR. Normal S1, S2. No murmurs, rubs or gallops.  Abdomen and : Soft, non-tender, nondistended, BS, no hepatosplenomegaly, no masses.  Extremities: no edemas, no extremity lymphadenopathy, no clubbing, no cyanosis.  Nervous System: Alert and oriented x 3, good concentration, no deficits.  Skin: Normal temperature, turgor and texture; no rash, ulcers or subcutaneous nodules  Psych: Appropriate affect, alert and oriented to person, place and time          Assessment:       1. Gastroesophageal reflux disease with esophagitis, unspecified whether hemorrhage  Assessment & Plan:  Resume PPI PRN  Barium swallow ordered  EGD  FOBT    Orders:  -     Ambulatory referral/consult to Endo Procedure ; Future; Expected date: 03/27/2024  -     pantoprazole (PROTONIX) 20 MG tablet; Take 1 tablet (20 mg total) by mouth daily as needed (gerd).  Dispense: 90 tablet; Refill: 1  -     Fecal Immunochemical Test (iFOBT); Future; Expected date: 03/26/2024    2. Immunodeficiency due to treatment with immunosuppressive medication  Assessment & Plan:  Continue same management for SLE for now      3. Systemic lupus erythematosus, unspecified SLE type, unspecified organ involvement status  Comments:  Stable, asymptomatic, f/u with rheumatology as scheduled    4. Dysphagia, unspecified type  -     FL Esophagram Complete; Future; Expected date: 03/26/2024  -     Ambulatory referral/consult to Endo Procedure ; Future; Expected date: 03/27/2024  -     pantoprazole (PROTONIX) 20 MG tablet; Take 1 tablet (20 mg total) by mouth daily as needed (gerd).  Dispense: 90 tablet; Refill: 1    5. Normocytic anemia  Assessment & Plan:  Chronic, 2/2 SLE (Chronic disease)  Inflammatory markers are slightly elevated today, which could explain the decrease in Hb Vs GI bleeding???  Lab Results   Component Value Date    WBC 4.5 03/22/2024    HGB 12.5 (L)  03/22/2024    HCT 38.8 03/22/2024    MCV 92.4 03/22/2024     03/22/2024       Pending f/u appointment with Rheumatology, we will follow recommendations.    Orders:  -     Fecal Immunochemical Test (iFOBT); Future; Expected date: 03/26/2024       Summary of orders / plan:         Barium swallow ordered  EGD  FOBT       Problem list updated  Medications reconciled  Education provided  Lifestyle recommendations given  AVS generated, explained, and sent to the patient.  RTC in :  September for AWV, labs not ordered yet            LEONARDO FIGUEROA MD, MPH  Internal Medicine  [facility]  [facility]

## 2024-03-27 ENCOUNTER — HOSPITAL ENCOUNTER (OUTPATIENT)
Dept: RADIOLOGY | Facility: HOSPITAL | Age: 49
Discharge: HOME | End: 2024-03-27
Attending: STUDENT IN AN ORGANIZED HEALTH CARE EDUCATION/TRAINING PROGRAM
Payer: COMMERCIAL

## 2024-03-27 ENCOUNTER — TELEPHONE (OUTPATIENT)
Dept: ENDOSCOPY | Facility: HOSPITAL | Age: 49
End: 2024-03-27
Payer: COMMERCIAL

## 2024-03-27 ENCOUNTER — ANESTHESIA EVENT (OUTPATIENT)
Dept: ENDOSCOPY | Facility: HOSPITAL | Age: 49
End: 2024-03-27
Payer: COMMERCIAL

## 2024-03-27 VITALS — WEIGHT: 204 LBS | HEIGHT: 70 IN | BODY MASS INDEX: 29.2 KG/M2

## 2024-03-27 DIAGNOSIS — R13.10 DYSPHAGIA, UNSPECIFIED TYPE: ICD-10-CM

## 2024-03-27 DIAGNOSIS — K21.00 GASTROESOPHAGEAL REFLUX DISEASE WITH ESOPHAGITIS, UNSPECIFIED WHETHER HEMORRHAGE: Primary | ICD-10-CM

## 2024-03-27 PROCEDURE — 74220 X-RAY XM ESOPHAGUS 1CNTRST: CPT | Mod: 26,,, | Performed by: RADIOLOGY

## 2024-03-27 PROCEDURE — 74220 X-RAY XM ESOPHAGUS 1CNTRST: CPT | Mod: TC

## 2024-03-27 PROCEDURE — 25500020 PHARM REV CODE 255: Performed by: STUDENT IN AN ORGANIZED HEALTH CARE EDUCATION/TRAINING PROGRAM

## 2024-03-27 PROCEDURE — A9698 NON-RAD CONTRAST MATERIALNOC: HCPCS | Performed by: STUDENT IN AN ORGANIZED HEALTH CARE EDUCATION/TRAINING PROGRAM

## 2024-03-27 RX ADMIN — BARIUM SULFATE 355 ML: 0.6 SUSPENSION ORAL at 08:03

## 2024-03-27 NOTE — TELEPHONE ENCOUNTER
Spoke to patient to schedule procedure(s) Upper Endoscopy (EGD)       Physician to perform procedure(s) Dr. BRITTANI De La Vega  Date of Procedure (s) 4/1/24  Arrival Time 10:00 AM  Time of Procedure(s) 11:00 AM   Location of Procedure(s) [place] [address]nd Floor  Type of Rx Prep sent to patient: N/A  Instructions provided to patient via [facility]    Patient was informed on the following information and verbalized understanding. Screening questionnaire reviewed with patient and complete. If procedure requires anesthesia, a responsible adult needs to be present to accompany the patient home, patient cannot drive after receiving anesthesia. Appointment details are tentative, especially check-in time. Patient will receive a prep-op call 7 days prior to confirm check-in time for procedure. If applicable the patient should contact their pharmacy to verify Rx for procedure prep is ready for pick-up. Patient was advised to call the scheduling department at [phone] if pharmacy states no Rx is available. Patient was advised to call the endoscopy scheduling department if any questions or concerns arise.      [facility] Scheduling Department

## 2024-03-27 NOTE — ANESTHESIA PREPROCEDURE EVALUATION
03/27/2024  Jone Mathur is a 48 y.o., male.       Patient Active Problem List   Diagnosis    Other forms of systemic lupus erythematosus    Latent tuberculosis by blood test    (QFT) QuantiFERON-TB test reaction without active tuberculosis    Gastroesophageal reflux disease with esophagitis    Normocytic anemia    Immunodeficiency due to treatment with immunosuppressive medication       No past medical history on file.    ECHO: No results found for this or any previous visit.      There is no height or weight on file to calculate BMI.    Tobacco Use: Low Risk  (3/26/2024)    Patient History     Smoking Tobacco Use: Never     Smokeless Tobacco Use: Never     Passive Exposure: Not on file       Social History     Substance and Sexual Activity   Drug Use Not on file        Alcohol Use: Not on file       Review of patient's allergies indicates:  No Known Allergies      Airway:  No value filed.    Pre-op Assessment    I have reviewed the Patient Summary Reports.    I have reviewed the NPO Status.   I have reviewed the Medications.     Review of Systems  Anesthesia Hx:             Denies Family Hx of Anesthesia complications.    Denies Personal Hx of Anesthesia complications.                    Hematology/Oncology:  Hematology Normal   Oncology Normal                                   EENT/Dental:  EENT/Dental Normal           Cardiovascular:  Cardiovascular Normal                                            Pulmonary:  Pulmonary Normal                       Renal/:  Renal/ Normal                 Hepatic/GI:  Hepatic/GI Normal                 Musculoskeletal:  Musculoskeletal Normal                Neurological:  Neurology Normal                                      Endocrine:  Endocrine Normal            Dermatological:  Skin Normal    Psych:  Psychiatric Normal                    Physical  Exam    Airway:  Mallampati: II         Anesthesia Plan  Type of Anesthesia, risks & benefits discussed:    Anesthesia Type: Gen Natural Airway  Intra-op Monitoring Plan: Standard ASA Monitors  Post Op Pain Control Plan: multimodal analgesia  Induction:  IV  Informed Consent: Informed consent signed with the Patient and all parties understand the risks and agree with anesthesia plan.  All questions answered.   ASA Score: 2  Day of Surgery Review of History & Physical: H&P Update referred to the surgeon/provider.    Ready For Surgery From Anesthesia Perspective.     .

## 2024-03-27 NOTE — TELEPHONE ENCOUNTER
Contacted the patient to schedule an endoscopy procedure(s) EGD. Spoke with patient. Patient states he is at the hospital for another test right now. Patient states he will call back to schedule. Endoscopy scheduling phone number provided.

## 2024-04-01 ENCOUNTER — HOSPITAL ENCOUNTER (OUTPATIENT)
Facility: HOSPITAL | Age: 49
Discharge: HOME | End: 2024-04-01
Attending: INTERNAL MEDICINE | Admitting: INTERNAL MEDICINE
Payer: COMMERCIAL

## 2024-04-01 ENCOUNTER — OFFICE VISIT (OUTPATIENT)
Dept: RHEUMATOLOGY | Facility: CLINIC | Age: 49
End: 2024-04-01
Payer: COMMERCIAL

## 2024-04-01 ENCOUNTER — TELEPHONE (OUTPATIENT)
Dept: RHEUMATOLOGY | Facility: CLINIC | Age: 49
End: 2024-04-01

## 2024-04-01 ENCOUNTER — ANESTHESIA (OUTPATIENT)
Dept: ENDOSCOPY | Facility: HOSPITAL | Age: 49
End: 2024-04-01
Payer: COMMERCIAL

## 2024-04-01 ENCOUNTER — TELEPHONE (OUTPATIENT)
Dept: RHEUMATOLOGY | Facility: CLINIC | Age: 49
End: 2024-04-01
Payer: COMMERCIAL

## 2024-04-01 ENCOUNTER — LAB VISIT (OUTPATIENT)
Dept: LAB | Facility: HOSPITAL | Age: 49
End: 2024-04-01
Attending: STUDENT IN AN ORGANIZED HEALTH CARE EDUCATION/TRAINING PROGRAM
Payer: COMMERCIAL

## 2024-04-01 VITALS
HEART RATE: 64 BPM | BODY MASS INDEX: 28.56 KG/M2 | OXYGEN SATURATION: 99 % | WEIGHT: 204 LBS | HEIGHT: 70 IN | WEIGHT: 204.13 LBS | HEIGHT: 71 IN | BODY MASS INDEX: 29.22 KG/M2 | RESPIRATION RATE: 23 BRPM | DIASTOLIC BLOOD PRESSURE: 86 MMHG | SYSTOLIC BLOOD PRESSURE: 127 MMHG | TEMPERATURE: 98 F

## 2024-04-01 DIAGNOSIS — M32.9 SYSTEMIC LUPUS ERYTHEMATOSUS, UNSPECIFIED SLE TYPE, UNSPECIFIED ORGAN INVOLVEMENT STATUS: Primary | ICD-10-CM

## 2024-04-01 DIAGNOSIS — D84.821 IMMUNODEFICIENCY DUE TO TREATMENT WITH IMMUNOSUPPRESSIVE MEDICATION: ICD-10-CM

## 2024-04-01 DIAGNOSIS — Z22.7 TB LUNG, LATENT: ICD-10-CM

## 2024-04-01 DIAGNOSIS — K21.00 GASTROESOPHAGEAL REFLUX DISEASE WITH ESOPHAGITIS, UNSPECIFIED WHETHER HEMORRHAGE: Primary | ICD-10-CM

## 2024-04-01 DIAGNOSIS — K21.9 GERD (GASTROESOPHAGEAL REFLUX DISEASE): ICD-10-CM

## 2024-04-01 DIAGNOSIS — Z79.899 ENCOUNTER FOR LONG-TERM (CURRENT) USE OF OTHER MEDICATIONS: ICD-10-CM

## 2024-04-01 DIAGNOSIS — R13.19 ESOPHAGEAL DYSPHAGIA: ICD-10-CM

## 2024-04-01 DIAGNOSIS — Z22.7 LATENT TUBERCULOSIS BY BLOOD TEST: ICD-10-CM

## 2024-04-01 DIAGNOSIS — Z79.899 IMMUNODEFICIENCY DUE TO TREATMENT WITH IMMUNOSUPPRESSIVE MEDICATION: ICD-10-CM

## 2024-04-01 DIAGNOSIS — M32.9 SYSTEMIC LUPUS ERYTHEMATOSUS, UNSPECIFIED SLE TYPE, UNSPECIFIED ORGAN INVOLVEMENT STATUS: ICD-10-CM

## 2024-04-01 DIAGNOSIS — E55.9 VITAMIN D DEFICIENCY: ICD-10-CM

## 2024-04-01 LAB
BILIRUB UR QL STRIP: NEGATIVE
CLARITY UR: CLEAR
COLOR UR: NORMAL
CREAT UR-MCNC: 95 MG/DL (ref 23–375)
GLUCOSE UR QL STRIP: NEGATIVE
HGB UR QL STRIP: NEGATIVE
KETONES UR QL STRIP: NEGATIVE
LEUKOCYTE ESTERASE UR QL STRIP: NEGATIVE
NITRITE UR QL STRIP: NEGATIVE
PH UR STRIP: 6 [PH] (ref 5–8)
PROT UR QL STRIP: NEGATIVE
PROT UR-MCNC: <7 MG/DL (ref 0–15)
PROT/CREAT UR: NORMAL MG/G{CREAT} (ref 0–0.2)
SP GR UR STRIP: 1.02 (ref 1–1.03)
URN SPEC COLLECT METH UR: NORMAL

## 2024-04-01 PROCEDURE — 88305 TISSUE EXAM BY PATHOLOGIST: CPT | Performed by: STUDENT IN AN ORGANIZED HEALTH CARE EDUCATION/TRAINING PROGRAM

## 2024-04-01 PROCEDURE — 37000008 HC ANESTHESIA 1ST 15 MINUTES: Performed by: INTERNAL MEDICINE

## 2024-04-01 PROCEDURE — 88342 IMHCHEM/IMCYTCHM 1ST ANTB: CPT | Mod: 26,,, | Performed by: STUDENT IN AN ORGANIZED HEALTH CARE EDUCATION/TRAINING PROGRAM

## 2024-04-01 PROCEDURE — 99215 OFFICE O/P EST HI 40 MIN: CPT | Mod: S$GLB,,, | Performed by: STUDENT IN AN ORGANIZED HEALTH CARE EDUCATION/TRAINING PROGRAM

## 2024-04-01 PROCEDURE — 43239 EGD BIOPSY SINGLE/MULTIPLE: CPT | Mod: ,,, | Performed by: INTERNAL MEDICINE

## 2024-04-01 PROCEDURE — 27201012 HC FORCEPS, HOT/COLD, DISP: Performed by: INTERNAL MEDICINE

## 2024-04-01 PROCEDURE — 88342 IMHCHEM/IMCYTCHM 1ST ANTB: CPT | Performed by: STUDENT IN AN ORGANIZED HEALTH CARE EDUCATION/TRAINING PROGRAM

## 2024-04-01 PROCEDURE — 94761 N-INVAS EAR/PLS OXIMETRY MLT: CPT

## 2024-04-01 PROCEDURE — 1160F RVW MEDS BY RX/DR IN RCRD: CPT | Mod: CPTII,S$GLB,, | Performed by: STUDENT IN AN ORGANIZED HEALTH CARE EDUCATION/TRAINING PROGRAM

## 2024-04-01 PROCEDURE — 37000009 HC ANESTHESIA EA ADD 15 MINS: Performed by: INTERNAL MEDICINE

## 2024-04-01 PROCEDURE — 63600175 PHARM REV CODE 636 W HCPCS: Performed by: NURSE ANESTHETIST, CERTIFIED REGISTERED

## 2024-04-01 PROCEDURE — 1159F MED LIST DOCD IN RCRD: CPT | Mod: CPTII,S$GLB,, | Performed by: STUDENT IN AN ORGANIZED HEALTH CARE EDUCATION/TRAINING PROGRAM

## 2024-04-01 PROCEDURE — 99900035 HC TECH TIME PER 15 MIN (STAT)

## 2024-04-01 PROCEDURE — 88305 TISSUE EXAM BY PATHOLOGIST: CPT | Mod: 26,,, | Performed by: STUDENT IN AN ORGANIZED HEALTH CARE EDUCATION/TRAINING PROGRAM

## 2024-04-01 PROCEDURE — 84156 ASSAY OF PROTEIN URINE: CPT | Performed by: STUDENT IN AN ORGANIZED HEALTH CARE EDUCATION/TRAINING PROGRAM

## 2024-04-01 PROCEDURE — 43239 EGD BIOPSY SINGLE/MULTIPLE: CPT | Performed by: INTERNAL MEDICINE

## 2024-04-01 PROCEDURE — 99999 PR PBB SHADOW E&M-EST. PATIENT-LVL III: CPT | Mod: PBBFAC,,, | Performed by: STUDENT IN AN ORGANIZED HEALTH CARE EDUCATION/TRAINING PROGRAM

## 2024-04-01 PROCEDURE — D9220A PRA ANESTHESIA: Mod: ,,, | Performed by: NURSE ANESTHETIST, CERTIFIED REGISTERED

## 2024-04-01 PROCEDURE — 25000003 PHARM REV CODE 250: Performed by: NURSE ANESTHETIST, CERTIFIED REGISTERED

## 2024-04-01 PROCEDURE — 81003 URINALYSIS AUTO W/O SCOPE: CPT | Performed by: STUDENT IN AN ORGANIZED HEALTH CARE EDUCATION/TRAINING PROGRAM

## 2024-04-01 PROCEDURE — 3008F BODY MASS INDEX DOCD: CPT | Mod: CPTII,S$GLB,, | Performed by: STUDENT IN AN ORGANIZED HEALTH CARE EDUCATION/TRAINING PROGRAM

## 2024-04-01 RX ORDER — LIDOCAINE HYDROCHLORIDE 20 MG/ML
INJECTION INTRAVENOUS
Status: DISCONTINUED | OUTPATIENT
Start: 2024-04-01 | End: 2024-04-01

## 2024-04-01 RX ORDER — SODIUM CHLORIDE 9 MG/ML
INJECTION, SOLUTION INTRAVENOUS CONTINUOUS
Status: DISCONTINUED | OUTPATIENT
Start: 2024-04-01 | End: 2024-04-01 | Stop reason: HOSPADM

## 2024-04-01 RX ORDER — PROPOFOL 10 MG/ML
VIAL (ML) INTRAVENOUS
Status: DISCONTINUED | OUTPATIENT
Start: 2024-04-01 | End: 2024-04-01

## 2024-04-01 RX ADMIN — LIDOCAINE HYDROCHLORIDE 100 MG: 20 INJECTION INTRAVENOUS at 11:04

## 2024-04-01 RX ADMIN — SODIUM CHLORIDE: 0.9 INJECTION, SOLUTION INTRAVENOUS at 11:04

## 2024-04-01 RX ADMIN — PROPOFOL 100 MG: 10 INJECTION, EMULSION INTRAVENOUS at 11:04

## 2024-04-01 RX ADMIN — GLYCOPYRROLATE 0.2 MG: 0.2 INJECTION, SOLUTION INTRAMUSCULAR; INTRAVENOUS at 11:04

## 2024-04-01 NOTE — PLAN OF CARE
Surgery checklst completed pt aao , waiting for procedural consents. Vss, belongings placed in locker 10  Questions answered. Pt has no concerns or needs at this time.

## 2024-04-01 NOTE — TRANSFER OF CARE
"Anesthesia Transfer of Care Note    Patient: Jone Mathur    Procedure(s) Performed: Procedure(s) (LRB):  EGD (ESOPHAGOGASTRODUODENOSCOPY) (N/A)    Patient location: PACU    Anesthesia Type: general    Transport from OR: Transported from OR on room air with adequate spontaneous ventilation    Post pain: adequate analgesia    Post assessment: no apparent anesthetic complications    Post vital signs: stable    Level of consciousness: awake    Nausea/Vomiting: no nausea/vomiting    Complications: none    Transfer of care protocol was followed      Last vitals: Visit Vitals  BP (!) 156/97 (BP Location: Left arm)   Pulse 70   Temp 36.9 °C (98.4 °F) (Temporal)   Resp 18   Ht 5' 11" (1.803 m)   Wt 92.5 kg (204 lb)   SpO2 99%   BMI 28.45 kg/m²     "

## 2024-04-01 NOTE — PROGRESS NOTES
Subjective:      Patient ID: Jone Mathur is a 48 y.o. male.    Chief Complaint: SLE    HPI:   Interval Hx:  Reports getting 3 episodes of night sweats since last visit where he was hot from the neck up. He also notes that his bedroom is warm. He reports these night sweats were different from that prior to his lupus diagnosis where he had drenching sweats and had to change the sheets and clothes. He also had one episode of shaking chills 2 weeks ago. He developed abdominal pain and had EGD done this morning showing erythematous gastropathy and biopsy result is pending. He gets some left knee pain sometimes lasting a day. Also gets right thumb swelling sometimes. Notes that he has a physically demanding job and is walking up and down stairs a lot.       Initial Hx:  Patient is a 49 yo M who presents for Rheumatology follow up for SLE. This is my first time seeing him. He was seeing Dr. Hooper who has now moved away. Patient had issues for many years. Started with Raynaud's in the fingers and joint pain swelling in the elbows and fingers since 2017. Went to several Rheumatologists (Dr. Francisco, Dr. Villalpando) and had some lab abnormalities (Noted to have SARA+ 1:1280, Sm+, RNP+, SSA+, cryoglobulins+) but was not diagnosed with a connective tissue disease. He took NSAIDs for joint pain and swelling.    Repeat labs showed SARA >2560, +Sm, SSA, DNA, RNP; RF/CCP negative; cmp normal; CBC, H/H 12/37, , CRP 18.3, U/A 1+OB but no RBC and no protein    Then he saw Dr. Álvaro Ferrer in [place] in 10/2021 and was diagnosed with lupus. He then established with Dr. Hooper and continued to follow up with Dr. Hooper.    He got night sweats October-December 2020.    TB Gold positive 9/2022 and he completed treatment for latent TB in 2023.    Started Plaquenl 400 mg daily in 10/2021  Added MTX 25 mg weekly in split dosing in 2/2022.    Today he reports he is doing well. 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 He  "takes precautions to keep his skin covered and use sunscreen. He sometimes gets pain in the knees but doesn't bother him to a great extent. No major issues with joint pain and swelling since he started the methotrexate.    No skin rashes, malar rash, photosensitivity   No telangiectasias   No calcinosis   No psoriasis   No patchy alopecia   No oral or nasal ulcers   No dry eyes or dry mouth   No pleurisy, chest pain, dyspnea, cough  No dysphagia, diplopia, dysphonia  No muscle weakness   No nausea, vomiting, diarrhea, constipation   No acid reflux  + Raynaud's  No digital ulcers   No cytopenias   No renal issues   No blood clots   No fever, chills, night sweats, weight loss, or loss of appetite   No pregnancy losses, pre-term deliveries, or pregnancy complications   No new onset headaches   No recurrent conjunctivitis, uveitis, scleritis, or episcleritis   No chronic or bloody diarrhea. No Ulcerative Colitis or Chron's (IBD)  No vaginal or penile and urethral discharge/STDs/ulcers   No unexplained lymphadenopathy  No parotitis   No seizures, strokes, psychosis  No sclerodactyly  No puffy hands  No perioral tightness     Social Hx: non smoker, no alcohol use. Works in construction.      Objective:   Ht 5' 10" (1.778 m)   Wt 92.6 kg (204 lb 2.3 oz)   BMI 29.29 kg/m²   Physical Exam  Constitutional:       General: He is not in acute distress.     Appearance: Normal appearance.   HENT:      Head: Normocephalic and atraumatic.      Mouth/Throat:      Mouth: Mucous membranes are moist.      Pharynx: Oropharynx is clear.   Cardiovascular:      Rate and Rhythm: Normal rate and regular rhythm.   Pulmonary:      Effort: Pulmonary effort is normal.      Breath sounds: Normal breath sounds.   Abdominal:      Palpations: Abdomen is soft.      Tenderness: There is no abdominal tenderness.   Musculoskeletal:         General: No tenderness.      Cervical back: Normal range of motion. No tenderness.      Comments: Slight effusion " of right knee. No other joint synovitis or effusions.   Skin:     General: Skin is warm and dry.   Neurological:      Mental Status: He is alert and oriented to person, place, and time. Mental status is at baseline.           Assessment:     1. Systemic lupus erythematosus, unspecified SLE type, unspecified organ involvement status    2. Latent tuberculosis by blood test    3. Immunodeficiency due to treatment with immunosuppressive medication    4. TB lung, latent    5. Encounter for long-term (current) use of other medications    6. Vitamin D deficiency          Plan:     Problem List Items Addressed This Visit          Unprioritized    Immunodeficiency due to treatment with immunosuppressive medication    Relevant Orders    Protein/Creatinine Ratio, Urine    Urinalysis    Anti-DNA Ab, Double-Stranded    C3 Complement    C4 Complement    CBC Auto Differential    Comprehensive Metabolic Panel    C-Reactive Protein    Protein/Creatinine Ratio, Urine    Sedimentation rate    Urinalysis    Latent tuberculosis by blood test    Relevant Orders    Protein/Creatinine Ratio, Urine    Urinalysis    Anti-DNA Ab, Double-Stranded    C3 Complement    C4 Complement    CBC Auto Differential    Comprehensive Metabolic Panel    C-Reactive Protein    Protein/Creatinine Ratio, Urine    Sedimentation rate    Urinalysis     Other Visit Diagnoses       Systemic lupus erythematosus, unspecified SLE type, unspecified organ involvement status    -  Primary    Relevant Orders    Protein/Creatinine Ratio, Urine    Urinalysis    Anti-DNA Ab, Double-Stranded    C3 Complement    C4 Complement    CBC Auto Differential    Comprehensive Metabolic Panel    C-Reactive Protein    Protein/Creatinine Ratio, Urine    Sedimentation rate    Urinalysis    Vitamin D    TB lung, latent        Relevant Orders    Protein/Creatinine Ratio, Urine    Urinalysis    Anti-DNA Ab, Double-Stranded    C3 Complement    C4 Complement    CBC Auto Differential     Comprehensive Metabolic Panel    C-Reactive Protein    Protein/Creatinine Ratio, Urine    Sedimentation rate    Urinalysis    Encounter for long-term (current) use of other medications        Relevant Orders    Protein/Creatinine Ratio, Urine    Urinalysis    Anti-DNA Ab, Double-Stranded    C3 Complement    C4 Complement    CBC Auto Differential    Comprehensive Metabolic Panel    C-Reactive Protein    Protein/Creatinine Ratio, Urine    Sedimentation rate    Urinalysis    Vitamin D deficiency        Relevant Orders    Vitamin D          Patient is a 47 yo M who presents for Rheumatology follow up for SLE (SARA+, dsDNA+, SSA+, Sm+, RNP+).    Problems:  SLE manifesting as arthritis  Raynaud's  Vitamin D deficiency    SARA+ 1:2560  dsDNA+, SSA+, Sm+, RNP+  Complements always normal  Never had proteinuria  At one point had +cryoglobulins    Hb baseline 12-13.  Some WBC counts around 3.7, never less than 3 in our system.  No thrombocytopenia.  Cr/GFR normal    RF and CCP negative    Vitamin D 8 in 2/2023    Normal Plaquenil eye exam 6/6/2023.    SLE and Raynaud's well controlled on current regimen. Reviewed his labs done by [facility] which show stable anemia, normal Cr and GFR. UA and UPCR not done. We decided to continue with current medications with occasional use of Aleve if needed (unless gastric erythema and pain prevents use of NSAIDs in the future) and prednisone which he has some leftover at home for flares.      Plan:  UA and UPCR today because these were not done by [facility]  Plaquenil 400 mg daily  Methotrexate 25 mg weekly in split dosing  Folic acid 1 mg daily  Vitamin D 50,000 units weekly  Counseled on UV exposure precautions for SLE  Counseled on core warming precautions for Raynaud's  Labs in 3 months for lupus monitoring and vitamin D at [facility]  Follow up every 3 months at [facility]      Follow up in about 3 months (around 7/1/2024).       I spent a total of 40 minutes on the day of the visit.  This includes face  to face time and non-face to face time preparing to see the patient (eg, review of tests), obtaining and/or reviewing separately obtained history, documenting clinical information in the electronic or other health record, independently interpreting results and communicating results to the patient/family/caregiver, or care coordinator.

## 2024-04-01 NOTE — ANESTHESIA POSTPROCEDURE EVALUATION
Anesthesia Post Evaluation    Patient: Jone Mathur    Procedure(s) Performed: Procedure(s) (LRB):  EGD (ESOPHAGOGASTRODUODENOSCOPY) (N/A)    Final Anesthesia Type: general      Patient location during evaluation: PACU  Patient participation: Yes- Able to Participate  Level of consciousness: awake and alert  Post-procedure vital signs: reviewed and stable  Pain management: adequate  Airway patency: patent    PONV status at discharge: No PONV  Anesthetic complications: no      Cardiovascular status: stable  Respiratory status: spontaneous ventilation  Hydration status: euvolemic  Follow-up not needed.              Vitals Value Taken Time   /89 04/01/24 1230   Temp 36.6 °C (97.9 °F) 04/01/24 1208   Pulse 64 04/01/24 1230   Resp 27 04/01/24 1230   SpO2 98 % 04/01/24 1230         Event Time   Out of Recovery 12:15:00         Pain/David Score: David Score: 10 (4/1/2024 12:15 PM)

## 2024-04-01 NOTE — LETTER
April 1, 2024    Jone Mathur  [address]  [place] [address]                   [address] [id number]   April 1, 2024     Patient: Jone Mathur   YOB: 1975   Date of Visit: 04/01/2024       To Whom it May Concern:    Jone Mathur was seen in my clinic on 04/01/2024. He may return to work on 04/02/2024 .    Please excuse him from any classes or work missed.    If you have any questions or concerns, please don't hesitate to call.    Sincerely,         Damaso Grajeda RN

## 2024-04-01 NOTE — LETTER
April 1, 2024    Jone Mathur  [address]  [place] [address]                   [address] [address]   April 1, 2024     Patient: Jone Mathur   YOB: 1975   Date of Visit: 3/27/2024       To Whom it May Concern:    Jone Mathur was seen in my clinic on 3/27/2024. He may return to work on 4/2/2024 .    Please excuse him from any classes or work missed.    If you have any questions or concerns, please don't hesitate to call.    Sincerely,         Damaso Grajeda RN

## 2024-04-01 NOTE — PROVATION PATIENT INSTRUCTIONS
Discharge Summary/Instructions after an Endoscopic Procedure  Patient Name: Jone Mathur  Patient MRN: [id number]  Patient YOB: 1975 Monday, April 1, 2024  Jasmeet De La Vega MD  Dear patient,  As a result of recent federal legislation (The Federal Cures Act), you may   receive lab or pathology results from your procedure in your [facility]   account before your physician is able to contact you. Your physician or   their representative will relay the results to you with their   recommendations at their soonest availability.  Thank you,  RESTRICTIONS:  During your procedure today, you received medications for sedation.  These   medications may affect your judgment, balance and coordination.  Therefore,   for 24 hours, you have the following restrictions:   - DO NOT drive a car, operate machinery, make legal/financial decisions,   sign important papers or drink alcohol.    ACTIVITY:  Today: no heavy lifting, straining or running due to procedural   sedation/anesthesia.  The following day: return to full activity including work.  DIET:  Eat and drink normally unless instructed otherwise.     TREATMENT FOR COMMON SIDE EFFECTS:  - Mild abdominal pain, nausea, belching, bloating or excessive gas:  rest,   eat lightly and use a heating pad.  - Sore Throat: treat with throat lozenges and/or gargle with warm salt   water.  - Because air was used during the procedure, expelling large amounts of air   from your rectum or belching is normal.  - If a bowel prep was taken, you may not have a bowel movement for 1-3 days.    This is normal.  SYMPTOMS TO WATCH FOR AND REPORT TO YOUR PHYSICIAN:  1. Abdominal pain or bloating, other than gas cramps.  2. Chest pain.  3. Back pain.  4. Signs of infection such as: chills or fever occurring within 24 hours   after the procedure.  5. Rectal bleeding, which would show as bright red, maroon, or black stools.   (A tablespoon of blood from the rectum is not serious, especially  if   hemorrhoids are present.)  6. Vomiting.  7. Weakness or dizziness.  GO DIRECTLY TO THE NEAREST EMERGENCY ROOM IF YOU HAVE ANY OF THE FOLLOWING:      Difficulty breathing              Chills and/or fever over 101 F   Persistent vomiting and/or vomiting blood   Severe abdominal pain   Severe chest pain   Black, tarry stools   Bleeding- more than one tablespoon   Any other symptom or condition that you feel may need urgent attention  Your doctor recommends these additional instructions:  If any biopsies were taken, your doctors clinic will contact you in 1 to 2   weeks with any results.  - Patient has a contact number available for emergencies.  The signs and   symptoms of potential delayed complications were discussed with the   patient.  Return to normal activities tomorrow.  Written discharge   instructions were provided to the patient.   - Discharge patient to home.   - Resume previous diet.   - Continue present medications.   - Await pathology results.   For questions, problems or results please call your physician - Jasmeet De La Vega MD at Work:  [phone].  [facility], EMERGENCY ROOM PHONE NUMBER: [phone]  IF A COMPLICATION OR EMERGENCY SITUATION ARISES AND YOU ARE UNABLE TO REACH   YOUR PHYSICIAN - GO DIRECTLY TO THE EMERGENCY ROOM.  Jasmeet De La Vega MD  4/1/2024 12:09:50 PM  This report has been verified and signed electronically.  Dear patient,  As a result of recent federal legislation (The Federal Cures Act), you may   receive lab or pathology results from your procedure in your [facility]   account before your physician is able to contact you. Your physician or   their representative will relay the results to you with their   recommendations at their soonest availability.  Thank you,  PROVATION

## 2024-04-01 NOTE — H&P
Short Stay Endoscopy History and Physical    PCP - Love Tijerina MD    Procedure - EGD  Sedation: GA  ASA - per anesthesia  Mallampati - per anesthesia  History of Anesthesia problems - no  Family history Anesthesia problems -  no     HPI:  This is a 48 y.o. male here for evaluation of : GERD and Dysphagia    Reflux - yes  Dysphagia - yes  Abdominal pain - no  Diarrhea - no    ROS:  Constitutional: No fevers, chills, No weight loss  ENT: No allergies  CV: No chest pain  Pulm: No cough, No shortness of breath  Ophtho: No vision changes  GI: see HPI  Medical History:  has no past medical history on file.    Surgical History:  has a past surgical history that includes Spine surgery.    Family History: family history includes Cancer (age of onset: 70) in his father; Hypertension in his father.. Otherwise no colon cancer, inflammatory bowel disease, or GI malignancies.    Social History:  reports that he has never smoked. He has never used smokeless tobacco.    Review of patient's allergies indicates:  No Known Allergies    Medications:   Medications Prior to Admission   Medication Sig Dispense Refill Last Dose    ergocalciferol (ERGOCALCIFEROL) 50,000 unit Cap Take 1 capsule (50,000 Units total) by mouth every 7 days. 12 capsule 3 Past Week    folic acid (FOLVITE) 1 MG tablet Take 1 tablet (1 mg total) by mouth once daily. 90 tablet 3 3/31/2024    hydroxychloroquine (PLAQUENIL) 200 mg tablet Take 2 tablets (400 mg total) by mouth once daily. 180 tablet 3 3/31/2024 at 2100    methotrexate 2.5 MG Tab Take 10 tablets (25 mg total) by mouth every 7 days. 120 tablet 3 3/31/2024    pantoprazole (PROTONIX) 20 MG tablet Take 1 tablet (20 mg total) by mouth daily as needed (gerd). 90 tablet 1 3/31/2024    fluticasone propionate (FLONASE) 50 mcg/actuation nasal spray 1 spray (50 mcg total) by Each Nostril route once daily. 16 g 0 Unknown       Objective Findings:    Vital Signs: Per nursing notes.    Physical Exam:  General  Appearance: Well appearing in no acute distress  Head:   Normocephalic, without obvious abnormality  Eyes:    No scleral icterus  Airway: Open  Neck: No restriction in mobility  Lungs: CTA bilaterally in anterior and posterior fields, no wheezes, no crackles.  Heart:  Regular rate and rhythm, S1, S2 normal, no murmurs heard  Abdomen: Soft, non tender, non distended      Labs:  Lab Results   Component Value Date    WBC 4.5 03/22/2024    HGB 12.5 (L) 03/22/2024    HCT 38.8 03/22/2024     03/22/2024    CHOL 174 09/29/2023    TRIG 67 09/29/2023    HDL 48 09/29/2023    ALT 22 03/22/2024    AST 23 03/22/2024     03/22/2024    K 4.3 03/22/2024     03/22/2024    CREATININE 0.94 03/22/2024    BUN 15 03/22/2024    CO2 28 03/22/2024    TSH 1.951 09/29/2023    PSA 2.1 09/29/2023    HGBA1C 5.6 09/29/2023         I have explained the risks and benefits of endoscopy procedures to the patient including but not limited to bleeding, perforation, infection, and death.    Thank you so much for allowing me to participate in the care of Jone De La Vega MD

## 2024-04-03 ENCOUNTER — LAB VISIT (OUTPATIENT)
Dept: LAB | Facility: HOSPITAL | Age: 49
End: 2024-04-03
Attending: STUDENT IN AN ORGANIZED HEALTH CARE EDUCATION/TRAINING PROGRAM
Payer: COMMERCIAL

## 2024-04-03 DIAGNOSIS — K21.00 GASTROESOPHAGEAL REFLUX DISEASE WITH ESOPHAGITIS, UNSPECIFIED WHETHER HEMORRHAGE: ICD-10-CM

## 2024-04-03 DIAGNOSIS — D64.9 NORMOCYTIC ANEMIA: ICD-10-CM

## 2024-04-03 PROCEDURE — 82274 ASSAY TEST FOR BLOOD FECAL: CPT | Performed by: STUDENT IN AN ORGANIZED HEALTH CARE EDUCATION/TRAINING PROGRAM

## 2024-04-04 LAB — HEMOCCULT STL QL IA: NEGATIVE

## 2024-04-11 LAB
FINAL PATHOLOGIC DIAGNOSIS: NORMAL
GROSS: NORMAL
Lab: NORMAL
SUPPLEMENTAL DIAGNOSIS: NORMAL

## 2024-04-15 ENCOUNTER — TELEPHONE (OUTPATIENT)
Dept: GASTROENTEROLOGY | Facility: CLINIC | Age: 49
End: 2024-04-15
Payer: COMMERCIAL

## 2024-04-15 NOTE — TELEPHONE ENCOUNTER
----- Message from Jasmeet De La Vega MD sent at 4/15/2024  3:31 PM CDT -----  Please notify patient, the stomach biopsies did not reveal any H.pylori.

## 2024-05-06 DIAGNOSIS — M32.9 SYSTEMIC LUPUS ERYTHEMATOSUS, UNSPECIFIED SLE TYPE, UNSPECIFIED ORGAN INVOLVEMENT STATUS: ICD-10-CM

## 2024-05-07 RX ORDER — HYDROXYCHLOROQUINE SULFATE 200 MG/1
400 TABLET, FILM COATED ORAL DAILY
Qty: 180 TABLET | Refills: 3 | Status: SHIPPED | OUTPATIENT
Start: 2024-05-07

## 2024-07-02 ENCOUNTER — TELEPHONE (OUTPATIENT)
Dept: RHEUMATOLOGY | Facility: CLINIC | Age: 49
End: 2024-07-02
Payer: COMMERCIAL

## 2024-07-02 NOTE — TELEPHONE ENCOUNTER
----- Message from Zandra Resendiz sent at 7/2/2024  9:50 AM CDT -----  Contact: self  Pt has an appt on 7/10/24 and needs to have his labs done prior and he would like them sent to [facility] in [place], [place].  Call pt back after sending so he knows to [facility] at [phone] and thanks

## 2024-07-02 NOTE — TELEPHONE ENCOUNTER
----- Message from Aleksandr Sutherland MD sent at 7/2/2024 10:11 AM CDT -----  Regarding: RE: Lab orders  Contact: Jone  You can always check my last note for clarity. Yes, I wanted the standing labs done. The orders seem to be in there, but let me know if I need to do them again. Thanks!  ----- Message -----  From: Neena aGlvez MA  Sent: 7/1/2024   2:34 PM CDT  To: Aleksandr Sutherland MD  Subject: FW: Lab orders                                   Are there any labs that you would like for the pt to have before their visit with you?  ----- Message -----  From: Yolis Baesley  Sent: 7/1/2024   1:38 PM CDT  To: [facility] Brandon Staff  Subject: Lab orders                                       Type:  Needs Medical Advice    Who Called: Jone   Symptoms (please be specific):    How long has patient had these symptoms:    Pharmacy name and phone #:    Would the patient rather a call back or a response via [facility]? call  Best Call Back Number:   [phone]  Additional Information: Jone is requesting lab orders be placed in Epic for scheduling prior to his appointment on 07/10/2024 if need please.

## 2024-07-10 ENCOUNTER — LAB VISIT (OUTPATIENT)
Dept: LAB | Facility: HOSPITAL | Age: 49
End: 2024-07-10
Attending: STUDENT IN AN ORGANIZED HEALTH CARE EDUCATION/TRAINING PROGRAM
Payer: COMMERCIAL

## 2024-07-10 ENCOUNTER — OFFICE VISIT (OUTPATIENT)
Dept: RHEUMATOLOGY | Facility: CLINIC | Age: 49
End: 2024-07-10
Payer: COMMERCIAL

## 2024-07-10 VITALS
BODY MASS INDEX: 29.22 KG/M2 | SYSTOLIC BLOOD PRESSURE: 127 MMHG | WEIGHT: 204.13 LBS | DIASTOLIC BLOOD PRESSURE: 83 MMHG | HEART RATE: 66 BPM | HEIGHT: 70 IN

## 2024-07-10 DIAGNOSIS — M32.9 SYSTEMIC LUPUS ERYTHEMATOSUS, UNSPECIFIED SLE TYPE, UNSPECIFIED ORGAN INVOLVEMENT STATUS: ICD-10-CM

## 2024-07-10 DIAGNOSIS — Z79.60 ENCOUNTER FOR MONITORING IMMUNOSUPPRESSIVE MEDICATION THERAPY CAUSING IMMUNODEFICIENCY: ICD-10-CM

## 2024-07-10 DIAGNOSIS — D84.821 IMMUNODEFICIENCY DUE TO DRUG THERAPY: ICD-10-CM

## 2024-07-10 DIAGNOSIS — D84.821 ENCOUNTER FOR MONITORING IMMUNOSUPPRESSIVE MEDICATION THERAPY CAUSING IMMUNODEFICIENCY: ICD-10-CM

## 2024-07-10 DIAGNOSIS — Z79.899 IMMUNODEFICIENCY DUE TO DRUG THERAPY: ICD-10-CM

## 2024-07-10 DIAGNOSIS — Z51.81 ENCOUNTER FOR MONITORING IMMUNOSUPPRESSIVE MEDICATION THERAPY CAUSING IMMUNODEFICIENCY: ICD-10-CM

## 2024-07-10 DIAGNOSIS — M32.9 SYSTEMIC LUPUS ERYTHEMATOSUS, UNSPECIFIED SLE TYPE, UNSPECIFIED ORGAN INVOLVEMENT STATUS: Primary | ICD-10-CM

## 2024-07-10 LAB
BILIRUB UR QL STRIP: NEGATIVE
CLARITY UR: CLEAR
COLOR UR: YELLOW
GLUCOSE UR QL STRIP: NEGATIVE
HGB UR QL STRIP: NEGATIVE
KETONES UR QL STRIP: NEGATIVE
LEUKOCYTE ESTERASE UR QL STRIP: NEGATIVE
NITRITE UR QL STRIP: NEGATIVE
PH UR STRIP: 6 [PH] (ref 5–8)
PROT UR QL STRIP: NEGATIVE
SP GR UR STRIP: >=1.03 (ref 1–1.03)
URN SPEC COLLECT METH UR: ABNORMAL

## 2024-07-10 PROCEDURE — 3008F BODY MASS INDEX DOCD: CPT | Mod: CPTII,S$GLB,, | Performed by: STUDENT IN AN ORGANIZED HEALTH CARE EDUCATION/TRAINING PROGRAM

## 2024-07-10 PROCEDURE — 3074F SYST BP LT 130 MM HG: CPT | Mod: CPTII,S$GLB,, | Performed by: STUDENT IN AN ORGANIZED HEALTH CARE EDUCATION/TRAINING PROGRAM

## 2024-07-10 PROCEDURE — 99999 PR PBB SHADOW E&M-EST. PATIENT-LVL III: CPT | Mod: PBBFAC,,, | Performed by: STUDENT IN AN ORGANIZED HEALTH CARE EDUCATION/TRAINING PROGRAM

## 2024-07-10 PROCEDURE — 1159F MED LIST DOCD IN RCRD: CPT | Mod: CPTII,S$GLB,, | Performed by: STUDENT IN AN ORGANIZED HEALTH CARE EDUCATION/TRAINING PROGRAM

## 2024-07-10 PROCEDURE — 99215 OFFICE O/P EST HI 40 MIN: CPT | Mod: S$GLB,,, | Performed by: STUDENT IN AN ORGANIZED HEALTH CARE EDUCATION/TRAINING PROGRAM

## 2024-07-10 PROCEDURE — 3079F DIAST BP 80-89 MM HG: CPT | Mod: CPTII,S$GLB,, | Performed by: STUDENT IN AN ORGANIZED HEALTH CARE EDUCATION/TRAINING PROGRAM

## 2024-07-10 PROCEDURE — 84156 ASSAY OF PROTEIN URINE: CPT | Performed by: STUDENT IN AN ORGANIZED HEALTH CARE EDUCATION/TRAINING PROGRAM

## 2024-07-10 PROCEDURE — 81003 URINALYSIS AUTO W/O SCOPE: CPT | Performed by: STUDENT IN AN ORGANIZED HEALTH CARE EDUCATION/TRAINING PROGRAM

## 2024-07-10 RX ORDER — METHOTREXATE 2.5 MG/1
25 TABLET ORAL
Qty: 40 TABLET | Refills: 6 | Status: SHIPPED | OUTPATIENT
Start: 2024-07-10

## 2024-07-10 RX ORDER — HYDROXYCHLOROQUINE SULFATE 200 MG/1
400 TABLET, FILM COATED ORAL DAILY
Qty: 60 TABLET | Refills: 6 | Status: SHIPPED | OUTPATIENT
Start: 2024-07-10

## 2024-07-10 NOTE — PATIENT INSTRUCTIONS
Plaquenil 400 mg daily  Methotrexate 25 mg weekly in split dosing  Folic acid 1 mg daily  Reduce vitamin D to 1,000 units daily.

## 2024-07-10 NOTE — PROGRESS NOTES
Subjective:      Patient ID: Jone Mathur is a 48 y.o. male.    Chief Complaint: SLE    HPI:   Patient presents for Rheumatology follow up for SLE.       Interval Hx:  Doing well from SLE perspective. Gets an MCP or elbow hurting for a day or so from time to time and usually can wait it out or sometimes takes an Aleve. Has a hypopigmented patch at the top of right side of neck, non tender. Denies joint swelling, significant stiffness, skin rashes, oral ulcers, patchy alopecia, sicca symptoms, cardiopulmonary symptoms, eye inflammation, IBD, fevers, weight loss, Raynaud's. Rheumatology review of systems is otherwise negative.      Initial Hx:  This is my first time seeing him. He was seeing Dr. Hooper who has now moved away. Patient had issues for many years. Started with Raynaud's in the fingers and joint pain swelling in the elbows and fingers since 2017. Went to several Rheumatologists (Dr. Francisco, Dr. Villalpando) and had some lab abnormalities (Noted to have SARA+ 1:1280, Sm+, RNP+, SSA+, cryoglobulins+) but was not diagnosed with a connective tissue disease. He took NSAIDs for joint pain and swelling.    Repeat labs showed SARA >2560, +Sm, SSA, DNA, RNP; RF/CCP negative; cmp normal; CBC, H/H 12/37, , CRP 18.3, U/A 1+OB but no RBC and no protein    Then he saw Dr. Álvaro Ferrer in [place] in 10/2021 and was diagnosed with lupus. He then established with Dr. Hooper and continued to follow up with Dr. Hooper.    He got night sweats October-December 2020.    TB Gold positive 9/2022 and he completed treatment for latent TB in 2023.    Started Plaquenl 400 mg daily in 10/2021  Added MTX 25 mg weekly in split dosing in 2/2022.    Today he reports he is doing well. 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 He takes precautions to keep his skin covered and use sunscreen. He sometimes gets pain in the knees but doesn't bother him to a great extent. No major issues with joint pain and swelling since he  "started the methotrexate.    No skin rashes, malar rash, photosensitivity   No telangiectasias   No calcinosis   No psoriasis   No patchy alopecia   No oral or nasal ulcers   No dry eyes or dry mouth   No pleurisy, chest pain, dyspnea, cough  No dysphagia, diplopia, dysphonia  No muscle weakness   No nausea, vomiting, diarrhea, constipation   No acid reflux  + Raynaud's  No digital ulcers   No cytopenias   No renal issues   No blood clots   No fever, chills, night sweats, weight loss, or loss of appetite   No pregnancy losses, pre-term deliveries, or pregnancy complications   No new onset headaches   No recurrent conjunctivitis, uveitis, scleritis, or episcleritis   No chronic or bloody diarrhea. No Ulcerative Colitis or Chron's (IBD)  No vaginal or penile and urethral discharge/STDs/ulcers   No unexplained lymphadenopathy  No parotitis   No seizures, strokes, psychosis  No sclerodactyly  No puffy hands  No perioral tightness     Social Hx: non smoker, no alcohol use. Works in construction.      Objective:   /83   Pulse 66   Ht 5' 10" (1.778 m)   Wt 92.6 kg (204 lb 2.3 oz)   BMI 29.29 kg/m²   Physical Exam  Constitutional:       General: He is not in acute distress.     Appearance: Normal appearance.   HENT:      Head: Normocephalic and atraumatic.      Mouth/Throat:      Mouth: Mucous membranes are moist.      Pharynx: Oropharynx is clear.   Cardiovascular:      Rate and Rhythm: Normal rate and regular rhythm.   Pulmonary:      Effort: Pulmonary effort is normal.      Breath sounds: Normal breath sounds.   Abdominal:      Palpations: Abdomen is soft.      Tenderness: There is no abdominal tenderness.   Musculoskeletal:         General: No tenderness.      Cervical back: Normal range of motion. No tenderness.      Comments: Slight effusion of right knee. No other joint synovitis or effusions.   Skin:     General: Skin is warm and dry.   Neurological:      Mental Status: He is alert and oriented to person, " place, and time. Mental status is at baseline.             Assessment and Plan:     Problem List Items Addressed This Visit    None  Visit Diagnoses       Systemic lupus erythematosus, unspecified SLE type, unspecified organ involvement status    -  Primary    Relevant Medications    hydroxychloroquine (PLAQUENIL) 200 mg tablet    methotrexate 2.5 MG Tab    Other Relevant Orders    Cryoglobulin    Anti-DNA Ab, Double-Stranded    C3 Complement    C4 Complement    CBC Auto Differential    Comprehensive Metabolic Panel    C-Reactive Protein    Protein/Creatinine Ratio, Urine    Sedimentation rate    Urinalysis    Protein/Creatinine Ratio, Urine    Urinalysis    Immunodeficiency due to drug therapy        Encounter for monitoring immunosuppressive medication therapy causing immunodeficiency                Patient is a 49 yo M who presents for Rheumatology follow up for SLE (SARA+, dsDNA+, SSA+, Sm+, RNP+).    Problems:  SLE manifesting as arthritis  Raynaud's  Vitamin D deficiency - resolved    SARA+ 1:2560  dsDNA+, SSA+, Sm+, RNP+  Complements always normal  Never had proteinuria  At one point had +cryoglobulins    Hb baseline 12-13. Saw Hematology in the past and they were not worried about it, he reports.  Some WBC counts around 3.7, never less than 3 in our system.  No thrombocytopenia.  Cr/GFR normal    RF and CCP negative    Normal Plaquenil eye exam 6/6/2023.    Reviewed labs which were sent in from [facility]: stable. UA and UPCR not done. SLE and Raynaud's well controlled on current regimen.       Plan:  UA and UPCR today because these were not done by [facility]  Continue Plaquenil 400 mg daily  Continue methotrexate 25 mg weekly in split dosing  Continue folic acid 1 mg daily  Reduce vitamin D to 1,000 IU daily. Completed 50,000 IU weekly course.  Counseled on UV exposure precautions for SLE  Counseled on core warming precautions for Raynaud's  Labs in 3-4 months for lupus monitoring at [facility]      Follow up in about 4  months (around 11/10/2024).

## 2024-07-11 LAB
CREAT UR-MCNC: 189 MG/DL (ref 23–375)
PROT UR-MCNC: 7 MG/DL (ref 0–15)
PROT/CREAT UR: 0.04 MG/G{CREAT} (ref 0–0.2)

## 2024-08-05 ENCOUNTER — PATIENT MESSAGE (OUTPATIENT)
Dept: RHEUMATOLOGY | Facility: CLINIC | Age: 49
End: 2024-08-05
Payer: COMMERCIAL

## 2024-10-02 ENCOUNTER — TELEPHONE (OUTPATIENT)
Dept: OPHTHALMOLOGY | Facility: CLINIC | Age: 49
End: 2024-10-02
Payer: COMMERCIAL

## 2024-10-02 NOTE — TELEPHONE ENCOUNTER
----- Message from Karey sent at 10/2/2024 10:42 AM CDT -----  [phone]    pt is asking for 10/22 if possible he is coming in for yearly exam he would like to be worked in for DFE and GOCT

## 2024-10-15 ENCOUNTER — TELEPHONE (OUTPATIENT)
Dept: RHEUMATOLOGY | Facility: CLINIC | Age: 49
End: 2024-10-15
Payer: COMMERCIAL

## 2024-10-15 DIAGNOSIS — M32.9 SYSTEMIC LUPUS ERYTHEMATOSUS, UNSPECIFIED SLE TYPE, UNSPECIFIED ORGAN INVOLVEMENT STATUS: Primary | ICD-10-CM

## 2024-10-15 NOTE — TELEPHONE ENCOUNTER
----- Message from Mariel sent at 10/15/2024  1:42 PM CDT -----  Contact: Jone  Patient is calling to speak with someone regarding lab. Patient request to confirm if lab work orders have been sent to [facility]. Please give patient a call back at [phone] to assist. .  Thank you,  GH

## 2024-10-24 ENCOUNTER — PATIENT MESSAGE (OUTPATIENT)
Dept: GASTROENTEROLOGY | Facility: CLINIC | Age: 49
End: 2024-10-24
Payer: COMMERCIAL

## 2024-10-30 ENCOUNTER — PATIENT MESSAGE (OUTPATIENT)
Dept: RESEARCH | Facility: HOSPITAL | Age: 49
End: 2024-10-30
Payer: COMMERCIAL

## 2024-11-18 ENCOUNTER — TELEPHONE (OUTPATIENT)
Dept: RHEUMATOLOGY | Facility: CLINIC | Age: 49
End: 2024-11-18
Payer: COMMERCIAL

## 2024-11-19 DIAGNOSIS — M32.9 SYSTEMIC LUPUS ERYTHEMATOSUS, UNSPECIFIED SLE TYPE, UNSPECIFIED ORGAN INVOLVEMENT STATUS: Primary | ICD-10-CM

## 2024-11-22 ENCOUNTER — OFFICE VISIT (OUTPATIENT)
Dept: RHEUMATOLOGY | Facility: CLINIC | Age: 49
End: 2024-11-22
Payer: COMMERCIAL

## 2024-11-22 ENCOUNTER — OFFICE VISIT (OUTPATIENT)
Dept: INTERNAL MEDICINE | Facility: CLINIC | Age: 49
End: 2024-11-22
Payer: COMMERCIAL

## 2024-11-22 ENCOUNTER — LAB VISIT (OUTPATIENT)
Dept: LAB | Facility: HOSPITAL | Age: 49
End: 2024-11-22
Attending: STUDENT IN AN ORGANIZED HEALTH CARE EDUCATION/TRAINING PROGRAM
Payer: COMMERCIAL

## 2024-11-22 VITALS
BODY MASS INDEX: 28.35 KG/M2 | WEIGHT: 198 LBS | HEIGHT: 70 IN | SYSTOLIC BLOOD PRESSURE: 132 MMHG | HEART RATE: 62 BPM | DIASTOLIC BLOOD PRESSURE: 87 MMHG

## 2024-11-22 VITALS
WEIGHT: 196.44 LBS | DIASTOLIC BLOOD PRESSURE: 75 MMHG | SYSTOLIC BLOOD PRESSURE: 135 MMHG | BODY MASS INDEX: 28.12 KG/M2 | HEIGHT: 70 IN | HEART RATE: 60 BPM

## 2024-11-22 DIAGNOSIS — Z00.00 ANNUAL PHYSICAL EXAM: Primary | ICD-10-CM

## 2024-11-22 DIAGNOSIS — Z79.899 IMMUNODEFICIENCY DUE TO TREATMENT WITH IMMUNOSUPPRESSIVE MEDICATION: ICD-10-CM

## 2024-11-22 DIAGNOSIS — D84.821 ENCOUNTER FOR MONITORING IMMUNOSUPPRESSIVE MEDICATION THERAPY CAUSING IMMUNODEFICIENCY: ICD-10-CM

## 2024-11-22 DIAGNOSIS — D84.821 IMMUNODEFICIENCY DUE TO DRUG THERAPY: ICD-10-CM

## 2024-11-22 DIAGNOSIS — D64.9 NORMOCYTIC ANEMIA: ICD-10-CM

## 2024-11-22 DIAGNOSIS — Z79.899 IMMUNODEFICIENCY DUE TO DRUG THERAPY: ICD-10-CM

## 2024-11-22 DIAGNOSIS — Z79.899 HIGH RISK MEDICATION USE: ICD-10-CM

## 2024-11-22 DIAGNOSIS — D84.821 IMMUNODEFICIENCY DUE TO TREATMENT WITH IMMUNOSUPPRESSIVE MEDICATION: ICD-10-CM

## 2024-11-22 DIAGNOSIS — K21.00 GASTROESOPHAGEAL REFLUX DISEASE WITH ESOPHAGITIS, UNSPECIFIED WHETHER HEMORRHAGE: ICD-10-CM

## 2024-11-22 DIAGNOSIS — M32.8 OTHER FORMS OF SYSTEMIC LUPUS ERYTHEMATOSUS, UNSPECIFIED ORGAN INVOLVEMENT STATUS: Chronic | ICD-10-CM

## 2024-11-22 DIAGNOSIS — M32.19 OTHER SYSTEMIC LUPUS ERYTHEMATOSUS WITH OTHER ORGAN INVOLVEMENT: ICD-10-CM

## 2024-11-22 DIAGNOSIS — D70.2 OTHER DRUG-INDUCED NEUTROPENIA: ICD-10-CM

## 2024-11-22 DIAGNOSIS — E55.9 VITAMIN D DEFICIENCY: ICD-10-CM

## 2024-11-22 DIAGNOSIS — Z79.899 HIGH RISK MEDICATION USE: Primary | ICD-10-CM

## 2024-11-22 DIAGNOSIS — R23.8 OTHER SKIN CHANGES: ICD-10-CM

## 2024-11-22 DIAGNOSIS — Z79.60 ENCOUNTER FOR MONITORING IMMUNOSUPPRESSIVE MEDICATION THERAPY CAUSING IMMUNODEFICIENCY: ICD-10-CM

## 2024-11-22 DIAGNOSIS — Z51.81 ENCOUNTER FOR MONITORING IMMUNOSUPPRESSIVE MEDICATION THERAPY CAUSING IMMUNODEFICIENCY: ICD-10-CM

## 2024-11-22 DIAGNOSIS — Z00.00 HEALTHCARE MAINTENANCE: ICD-10-CM

## 2024-11-22 DIAGNOSIS — M32.19 OTHER SYSTEMIC LUPUS ERYTHEMATOSUS WITH OTHER ORGAN INVOLVEMENT: Primary | ICD-10-CM

## 2024-11-22 PROBLEM — R76.12 (QFT) QUANTIFERON-TB TEST REACTION WITHOUT ACTIVE TUBERCULOSIS: Status: RESOLVED | Noted: 2022-10-03 | Resolved: 2024-11-22

## 2024-11-22 PROBLEM — Z22.7 LATENT TUBERCULOSIS BY BLOOD TEST: Status: RESOLVED | Noted: 2022-09-23 | Resolved: 2024-11-22

## 2024-11-22 LAB
BILIRUB UR QL STRIP: NEGATIVE
CLARITY UR: CLEAR
COLOR UR: NORMAL
CREAT UR-MCNC: 113 MG/DL (ref 23–375)
GLUCOSE UR QL STRIP: NEGATIVE
HGB UR QL STRIP: NEGATIVE
KETONES UR QL STRIP: NEGATIVE
LEUKOCYTE ESTERASE UR QL STRIP: NEGATIVE
NITRITE UR QL STRIP: NEGATIVE
PH UR STRIP: 6 [PH] (ref 5–8)
PROT UR QL STRIP: NEGATIVE
PROT UR-MCNC: <7 MG/DL (ref 0–15)
PROT/CREAT UR: NORMAL MG/G{CREAT} (ref 0–0.2)
SP GR UR STRIP: 1.02 (ref 1–1.03)
URN SPEC COLLECT METH UR: NORMAL

## 2024-11-22 PROCEDURE — 99999 PR PBB SHADOW E&M-EST. PATIENT-LVL III: CPT | Mod: PBBFAC,,, | Performed by: STUDENT IN AN ORGANIZED HEALTH CARE EDUCATION/TRAINING PROGRAM

## 2024-11-22 PROCEDURE — 84156 ASSAY OF PROTEIN URINE: CPT | Performed by: STUDENT IN AN ORGANIZED HEALTH CARE EDUCATION/TRAINING PROGRAM

## 2024-11-22 PROCEDURE — 81003 URINALYSIS AUTO W/O SCOPE: CPT | Performed by: STUDENT IN AN ORGANIZED HEALTH CARE EDUCATION/TRAINING PROGRAM

## 2024-11-22 PROCEDURE — 82306 VITAMIN D 25 HYDROXY: CPT | Performed by: STUDENT IN AN ORGANIZED HEALTH CARE EDUCATION/TRAINING PROGRAM

## 2024-11-22 PROCEDURE — 36415 COLL VENOUS BLD VENIPUNCTURE: CPT | Performed by: STUDENT IN AN ORGANIZED HEALTH CARE EDUCATION/TRAINING PROGRAM

## 2024-11-22 RX ORDER — PNEUMOCOCCAL 20-VALENT CONJUGATE VACCINE 2.2; 2.2; 2.2; 2.2; 2.2; 2.2; 2.2; 2.2; 2.2; 2.2; 2.2; 2.2; 2.2; 2.2; 2.2; 2.2; 4.4; 2.2; 2.2; 2.2 UG/.5ML; UG/.5ML; UG/.5ML; UG/.5ML; UG/.5ML; UG/.5ML; UG/.5ML; UG/.5ML; UG/.5ML; UG/.5ML; UG/.5ML; UG/.5ML; UG/.5ML; UG/.5ML; UG/.5ML; UG/.5ML; UG/.5ML; UG/.5ML; UG/.5ML; UG/.5ML
0.5 INJECTION, SUSPENSION INTRAMUSCULAR ONCE
Qty: 0.5 ML | Refills: 0 | Status: SHIPPED | OUTPATIENT
Start: 2024-11-22 | End: 2024-11-22

## 2024-11-22 NOTE — PROGRESS NOTES
Subjective:      Patient ID: Jone Mathur is a 49 y.o. male.    Chief Complaint: SLE    HPI:   Patient presents for Rheumatology follow up for SLE.     Interval Hx:  Symptomatically doing well. Gets some pain in the right wrist sometimes. Ankles swell at the end of the day. Gets arthalgias at the end of the day which he attributes to his job. Overall he feels well and without inflammatory symptoms currently. Denies joint swelling, significant stiffness, skin rashes, oral ulcers, patchy alopecia, sicca symptoms, cardiopulmonary symptoms, eye inflammation, IBD, fevers, weight loss, Raynaud's. Rheumatology review of systems is otherwise negative.        Initial Hx:  This is my first time seeing him. He was seeing Dr. Hooper who has now moved away. Patient had issues for many years. Started with Raynaud's in the fingers and joint pain swelling in the elbows and fingers since 2017. Went to several Rheumatologists (Dr. Francisco, Dr. Villalpando) and had some lab abnormalities (Noted to have SARA+ 1:1280, Sm+, RNP+, SSA+, cryoglobulins+) but was not diagnosed with a connective tissue disease. He took NSAIDs for joint pain and swelling.    Repeat labs showed SARA >2560, +Sm, SSA, DNA, RNP; RF/CCP negative; cmp normal; CBC, H/H 12/37, , CRP 18.3, U/A 1+OB but no RBC and no protein    Then he saw Dr. Álvaro Ferrer in [place] in 10/2021 and was diagnosed with lupus. He then established with Dr. Hoopre and continued to follow up with Dr. Hooper.    He got night sweats October-December 2020.    TB Gold positive 9/2022 and he completed treatment for latent TB in 2023.    Started Plaquenl 400 mg daily in 10/2021  Added MTX 25 mg weekly in split dosing in 2/2022.    Today he reports he is doing well. 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 He takes precautions to keep his skin covered and use sunscreen. He sometimes gets pain in the knees but doesn't bother him to a great extent. No major issues with joint pain and  "swelling since he started the methotrexate.    No skin rashes, malar rash, photosensitivity   No telangiectasias   No calcinosis   No psoriasis   No patchy alopecia   No oral or nasal ulcers   No dry eyes or dry mouth   No pleurisy, chest pain, dyspnea, cough  No dysphagia, diplopia, dysphonia  No muscle weakness   No nausea, vomiting, diarrhea, constipation   No acid reflux  + Raynaud's  No digital ulcers   No cytopenias   No renal issues   No blood clots   No fever, chills, night sweats, weight loss, or loss of appetite   No pregnancy losses, pre-term deliveries, or pregnancy complications   No new onset headaches   No recurrent conjunctivitis, uveitis, scleritis, or episcleritis   No chronic or bloody diarrhea. No Ulcerative Colitis or Chron's (IBD)  No vaginal or penile and urethral discharge/STDs/ulcers   No unexplained lymphadenopathy  No parotitis   No seizures, strokes, psychosis  No sclerodactyly  No puffy hands  No perioral tightness     Social Hx: non smoker, no alcohol use. Works in construction.      Objective:   /87 (BP Location: Right arm, Patient Position: Sitting)   Pulse 62   Ht 5' 10" (1.778 m)   Wt 89.8 kg (197 lb 15.6 oz)   BMI 28.41 kg/m²   Physical Exam  Constitutional:       General: He is not in acute distress.     Appearance: Normal appearance.   HENT:      Head: Normocephalic and atraumatic.      Mouth/Throat:      Mouth: Mucous membranes are moist.      Pharynx: Oropharynx is clear.      Comments: No oral ulcers.   Cardiovascular:      Rate and Rhythm: Normal rate and regular rhythm.   Pulmonary:      Effort: Pulmonary effort is normal.      Breath sounds: Normal breath sounds.   Abdominal:      Palpations: Abdomen is soft.      Tenderness: There is no abdominal tenderness.   Musculoskeletal:         General: No swelling or tenderness.      Cervical back: Normal range of motion. No tenderness.   Skin:     General: Skin is warm and dry.   Neurological:      Mental Status: He is " alert and oriented to person, place, and time. Mental status is at baseline.             Assessment and Plan:     Problem List Items Addressed This Visit          Unprioritized    Other forms of systemic lupus erythematosus - Primary (Chronic)    Relevant Orders    Protein/Creatinine Ratio, Urine    Urinalysis    Vitamin D     Other Visit Diagnoses       Immunodeficiency due to drug therapy        Relevant Orders    Protein/Creatinine Ratio, Urine    Urinalysis    Encounter for monitoring immunosuppressive medication therapy causing immunodeficiency        Relevant Orders    Protein/Creatinine Ratio, Urine    Urinalysis    High risk medication use        Relevant Orders    Protein/Creatinine Ratio, Urine    Urinalysis    Other drug-induced neutropenia        Relevant Orders    CBC Auto Differential    Vitamin D deficiency        Relevant Orders    Vitamin D           Latest Reference Range & Units 07/10/24 14:10 11/20/24 09:53   WBC 3.8 - 10.8 Thousand/uL  2.8 (L)   RBC 4.20 - 5.80 Million/uL  4.27   Hemoglobin 13.2 - 17.1 g/dL  12.7 (L)   Hematocrit 38.5 - 50.0 %  39.8   MCV 80.0 - 100.0 fL  93.2   MCH 27.0 - 33.0 pg  29.7   MCHC 32.0 - 36.0 g/dL  31.9 (L)   RDW 11.0 - 15.0 %  14.2   Platelet Count 140 - 400 Thousand/uL  181   MPV 7.5 - 12.5 fL  11.4   Neutrophils Relative %  28.9   Lymph % %  52.5   Mono % %  14.0   Eos % %  3.2   Basophil % %  1.4   Neutrophils, Abs 1,500 - 7,800 cells/uL  809 (L)   Lymph # 850 - 3,900 cells/uL  1,470   Mono # 200 - 950 cells/uL  392   Eos # 15 - 500 cells/uL  90   Baso # 0 - 200 cells/uL  39   Sed Rate < OR = 15 mm/h  9   Sodium 135 - 146 mmol/L  138   Potassium 3.5 - 5.3 mmol/L  4.3   Chloride 98 - 110 mmol/L  105   CO2 20 - 32 mmol/L  29   BUN 7 - 25 mg/dL  12   Creatinine 0.60 - 1.29 mg/dL  0.96   BUN/CREAT RATIO 6 - 22 (calc)  SEE NOTE:   eGFR > OR = 60 mL/min/1.73m2  97   Glucose 65 - 99 mg/dL  81   Calcium 8.6 - 10.3 mg/dL  9.2   ALP 36 - 130 U/L  76   PROTEIN TOTAL 6.1 -  8.1 g/dL  7.1   Albumin 3.6 - 5.1 g/dL  4.1   Albumin/Globulin Ratio 1.0 - 2.5 (calc)  1.4   BILIRUBIN TOTAL 0.2 - 1.2 mg/dL  0.9   AST 10 - 40 U/L  26   ALT 9 - 46 U/L  23   CRP <8.0 mg/L  6.0   Globulin, Total 1.9 - 3.7 g/dL (calc)  3.0   ds DNA Ab   Pending (IP)   Complement (C-3) 82 - 185 mg/dL  118   Complement (C-4) 15 - 53 mg/dL  22   Cryocrit   Pending (IP)   Specimen UA  Urine, Clean Catch    Color, UA Yellow, Straw, [place]  Yellow    Appearance, UA Clear  Clear    Spec Grav UA 1.005 - 1.030  >=1.030 !    pH, UA 5.0 - 8.0  6.0    Protein, UA Negative  Negative    Glucose, UA Negative  Negative    Ketones, UA Negative  Negative    Blood, UA Negative  Negative    NITRITE UA Negative  Negative    Bilirubin (UA) Negative  Negative    Leukocyte Esterase, UA Negative  Negative    Urine Creatinine 23.0 - 375.0 mg/dL 189.0    Urine Protein 0 - 15 mg/dL 7    Urine Protein/Creatinine Ratio 0.00 - 0.20  0.04    (L): Data is abnormally low  !: Data is abnormal  (IP): In Process          Patient is a 47 yo M who presents for Rheumatology follow up for SLE (SARA+, dsDNA+, SSA+, Sm+, RNP+).  SLE manifesting with arthritis, Raynaud's.  Vitamin D deficiency    SARA+ 1:2560  dsDNA+, SSA+, Sm+, RNP+  At one point had +cryoglobulins  Negative RF and CCP  Normal complements.  No proteinuria.    Hb baseline 12-13. Saw Hematology in the past and they were not worried about it, he reports.  Intermittent mild leukopenia.  No thrombocytopenia.  Cr/GFR normal    RF and CCP negative    Normal Plaquenil eye exam 6/6/2023.    I have reviewed the above labs. Leukopenia with neutropenia. Might be due to MTX. No symptomatic concerns. No infections. No new meds.  I have reviewed the notes from his PCP visit today.    Plan:  UA, UPCR, vitamin D today because these were not done by [facility]  Stop methotrexate for 1 month.  Continue folic acid 1 mg daily.  Continue hydroxychloroquine 400 mg daily.  Repeat CBC lab in 1 month at [facility].  Plan for 3-4  month follow up with lupus labs.  He will message 1 month prior to follow up for lab orders. CBC, CMP, ESR, CRP, complements, dsDNA, cryoglobulins, UA, UPCR.  Counseled on UV exposure precautions for SLE  Counseled on core warming precautions for Raynaud's      High Risk Medication Monitoring encounter  Drug therapy requiring intensive monitoring for toxicity  No current medication related issues, no evidence of toxicity  Appropriate labs ordered for toxicity monitoring    Compromised immune system secondary to autoimmune disease and/or use of immunosuppressive drugs.  Monitor carefully for infections.  Advised patient to get immediate medical care if any infection arises.  Also advised strict adherence age-appropriate vaccinations and cancer screenings with PCP.    Patient advised to hold DMARD and/or biologic therapy for signs of infection or for surgery. If you are unsure what to do please call our office for instruction.[facility] [phone],      Follow up in about 3 months (around 2/22/2025).         Today's visit is associated with current or anticipated ongoing medical care related to this patient's diagnosis of lupus. Lupus is a chronic disease which will require regular follow up to manage symptoms and progression. The patient is to return to the office within a minimum of 3-6 months to review symptoms and function at that time. CPT code

## 2024-11-22 NOTE — ASSESSMENT & PLAN NOTE
Areas of hyperpigmentation over the neck area under the mandible.  Possibly vitiligo.    I am referring patient to Dermatology for evaluation

## 2024-11-22 NOTE — ASSESSMENT & PLAN NOTE
Improved.    Lab Results   Component Value Date    WBC 2.8 (L) 11/20/2024    HGB 12.7 (L) 11/20/2024    HCT 39.8 11/20/2024    MCV 93.2 11/20/2024     11/20/2024       At baseline.

## 2024-11-22 NOTE — ASSESSMENT & PLAN NOTE
Patient is on methotrexate and hydrochloroquine.    Currently asymptomatic and feeling well.    Continue management as per rheumatology.    We have discussed precautions to avoid getting viral bacterial infections due to his immunosuppression

## 2024-11-22 NOTE — PATIENT INSTRUCTIONS
Stop methotrexate for 1 month.  Continue folic acid 1 mg daily.  Continue hydroxychloroquine 400 mg daily.  [facility]    Plan for 3-4 month follow up with lupus labs.

## 2024-11-22 NOTE — PATIENT INSTRUCTIONS
Wear mask when you go to crowded places or when you are near people with active viral infections.

## 2024-11-22 NOTE — PROGRESS NOTES
Subjective:       Patient ID: Jone Mathur is a 49 y.o. male.    Chief Complaint: Annual Exam    HPI    Jone Mathur is a 49 y.o. male , English speaking, with a history of:  SLE  Latent TB (positive quantiferon) s/p Tx with rifampin x 4 m  GERD     [Local Patient]  Originally from [facility]  Lives in: [facility] [id number]          Patient comes to the clinic for a general physical exam (Annual Wellness Visit) through [facility].    Patient overall is doing very well.  He denies recent episodes of joint pain.    He continues to follow with Rheumatology, last appointment was in July of 2024.      Patient reports that about 1 month ago he had an episode of generalized body aches and malaise, but he also noted that he had blisters over the right side of his upper lip.    This went away and during the weekend on its own.      Patient denies fatigue, weakness, dizziness, chest pain, palpitations, shortness for breath.      He recently noticed hyperpigmentation of some areas under his mandible.  He would like a referral to Dermatology for a checkup.    Patient denies new episodes of epigastric pain.  He had EGD on April 1, 2024 that determined he had chronic gastritis.    He is not on PPI at this time.    No other complaints      Latest PCP visits:      9/29/23 AWV: anemia, arthritis  9/23/22 SLE, establish care    Changes in health or medications: No    Specialists visits and recommendations:        H/o ER or Urgent care visits:   NO    H/o Hospitalizations:  NO    H/o falls: None     Life events / lifestyle:   Nothing new      Most recent / available laboratories and studies reviewed with the patient:    Recent Labs   Lab 03/22/24  1440 07/05/24  1457 11/20/24  0953   WBC 4.5 3.1 L 2.8 L   Hemoglobin 12.5 L 12.0 L 12.7 L   Hematocrit 38.8 37.0 L 39.8   MCV 92.4 91.4 93.2   Platelets 185 194 181       Recent Labs   Lab 02/04/22  0946 04/01/22  1055 08/01/22  1648 03/22/24  1440 07/05/24  1457  11/20/24  0953   Glucose 89 89   < > 93 102 H 81   Sodium 138 139   < > 139 142 138   Potassium 4.4 4.7   < > 4.3 4.1 4.3   BUN 13 12   < > 15 14 12   Creatinine 1.1 1.0   < > 0.94 1.20 0.96   eGFR if non  >60 >60  --   --   --   --    Total Bilirubin 0.5 0.7   < > 0.9 1.0 0.9   AST 22 22   < > 23 22 26   ALT 15 14   < > 22 20 23    < > = values in this interval not displayed.       Recent Labs   Lab 09/29/23  1313   Hemoglobin A1C 5.6       Recent Labs   Lab 10/21/22  1024 09/29/23  1313   Cholesterol 208 H 174   Triglycerides 77 67   HDL 52 48   LDL Cholesterol 140.6 112.6   Non-HDL Cholesterol 156 126       Recent Labs   Lab 09/14/22  1628 09/29/23  1313   TSH 1.540 1.951   PSA, Screen  --  2.1       Recent Labs   Lab 09/14/22  1628   HIV 1/2 Ag/Ab Non-reactive   Hepatitis C Ab Non-reactive       No results found for this or any previous visit.    FL Esophagram Complete  Narrative: EXAMINATION:  FL ESOPHAGRAM COMPLETE    CLINICAL HISTORY:  Dysphagia, unspecified    FINDINGS:  Rapid sequence imaging demonstrated normal swallowing mechanism with no evidence of aspiration.  Cervical esophagus was normal.  The entire esophagus is normal in course and caliber.  No stricture ulceration or other abnormality seen.  GE junction is normal.  The barium tablet passed quickly.  There was no evidence of a hiatal hernia.  Impression: Normal esophagram.    Electronically signed by: Marquis Pearson MD  Date:    03/27/2024  Time:    08:58       Current medications:    Current Outpatient Medications:     folic acid (FOLVITE) 1 MG tablet, Take 1 tablet (1 mg total) by mouth once daily., Disp: 90 tablet, Rfl: 3    hydroxychloroquine (PLAQUENIL) 200 mg tablet, Take 2 tablets (400 mg total) by mouth once daily., Disp: 60 tablet, Rfl: 6    methotrexate 2.5 MG Tab, Take 10 tablets (25 mg total) by mouth every 7 days., Disp: 40 tablet, Rfl: 6    pneumoc 20-yen conj-dip cr,PF, (PREVNAR 20, PF,) 0.5 mL Syrg injection, Inject  "0.5 mLs into the muscle once. for 1 dose, Disp: 0.5 mL, Rfl: 0      Healthcare Maintenance:  Colon cancer screening:         Vaccinations:        Tetanus: no       Pneumonia: 2023       Zoster: no       Influenza: no        COVID vaccination: completed x 2    Depression screening: PHQ2 score = 0    Annual Wellness visit approx. Month: NOVEMBER    Past Medical History reviewed and updated:    Past Medical History:   Diagnosis Date    (QFT) QuantiFERON-TB test reaction without active tuberculosis 10/03/2022    Acid reflux     Latent tuberculosis by blood test 09/23/2022       Past Surgical History:   Procedure Laterality Date    ESOPHAGOGASTRODUODENOSCOPY N/A 4/1/2024    Procedure: EGD (ESOPHAGOGASTRODUODENOSCOPY);  Surgeon: Jasmeet De La Vega MD;  Location: Replaced by [facility];  Service: Endoscopy;  Laterality: N/A;  Referred by Dr. [facility]  3/28 pre-call complete; [place]    SPINE SURGERY         Family History   Problem Relation Name Age of Onset    Thyroid disease Mother      Cancer Father  70        Prostate cancer.    Hypertension Father         ROS  11-point review of systems done. Negative except for detailed in the HPI.        Objective:      /75 (BP Location: Right arm, Patient Position: Sitting)   Pulse 60   Ht 5' 10" (1.778 m)   Wt 89.1 kg (196 lb 6.9 oz)   BMI 28.18 kg/m²      Physical Exam   General appearance: Good general aspect, NAD, conversant   Eyes and HEENT: Normal sclerae, moist mucous membranes, no thyromegaly or lymphadenopathy  Respiratory: No work of breathing, clear to auscultation bilaterally. No rales, rhonchi, wheezing, or rubs.  Cardiovascular: PMI not displaced. RRR. Normal S1, S2. No murmurs, rubs or gallops.  Abdomen and : Soft, non-tender, nondistended, BS, no hepatosplenomegaly, no masses.  Extremities: no edemas, no extremity lymphadenopathy, no clubbing, no cyanosis.  Nervous System: Alert and oriented x 3, good concentration, no deficits.  Skin: Normal " temperature, turgor and texture; no rash, ulcers or subcutaneous nodules  Hyperpigmented thick plaques #3, 1 cm x 1 cm diameter noted on exam.  Psych: Appropriate affect, alert and oriented to person, place and time          Assessment:       1. Annual physical exam  Assessment & Plan:  Patient is in overall good general health.  Stable medical conditions listed on the PMH.  Labs reviewed and patient notified.        2. Other forms of systemic lupus erythematosus, unspecified organ involvement status  Overview:  Likely SLE with inflammatory arthritis, hx raynaud's, and with +SARA, SSA, Sm, DNA, RNP antibodies  Hx negative for chest sx, rashes, CNS sx  Studies show no renal disease      Assessment & Plan:  Stable.    Continue therapy as recommended by Rheumatology (Rx to chloroquine, methotrexate, folic acid.          3. Normocytic anemia  Assessment & Plan:  Improved.    Lab Results   Component Value Date    WBC 2.8 (L) 11/20/2024    HGB 12.7 (L) 11/20/2024    HCT 39.8 11/20/2024    MCV 93.2 11/20/2024     11/20/2024       At baseline.      4. Other skin changes  Assessment & Plan:  Areas of hyperpigmentation over the neck area under the mandible.  Possibly vitiligo.    I am referring patient to Dermatology for evaluation    Orders:  -     Ambulatory referral/consult to Dermatology; Future; Expected date: 11/29/2024    5. Immunodeficiency due to treatment with immunosuppressive medication  Assessment & Plan:  Patient is on methotrexate and hydrochloroquine.    Currently asymptomatic and feeling well.    Continue management as per rheumatology.    We have discussed precautions to avoid getting viral bacterial infections due to his immunosuppression    Orders:  -     pneumoc 20-yen conj-dip cr,PF, (PREVNAR 20, PF,) 0.5 mL Syrg injection; Inject 0.5 mLs into the muscle once. for 1 dose  Dispense: 0.5 mL; Refill: 0    6. Gastroesophageal reflux disease with esophagitis, unspecified whether hemorrhage  Assessment &  Plan:  Asymptomatic.    Observation for now      7. Healthcare maintenance  Assessment & Plan:  Health care maintenance and core gaps reviewed and assessed with patient.  Vaccinations recommended:        Tetanus - O       Shingles - N/A       Influenza - O       Pneumonia - O   Colon cancer screning:   Colonoscopy: 2022  Lifestyle recommendations given.  Physical activity recommended.    Legend: Ordered (O), Declined (D), Current (C)        Orders:  -     pneumoc 20-yen conj-dip cr,PF, (PREVNAR 20, PF,) 0.5 mL Syrg injection; Inject 0.5 mLs into the muscle once. for 1 dose  Dispense: 0.5 mL; Refill: 0       Summary of orders / plan:       Continue same management   Refills not needed today from me.    Referral to Dermatology              Patient Instructions   Wear mask when you go to crowded places or when you are near people with active viral infections.       Problem list updated  Medications reconciled    Education provided  Lifestyle recommendations given  AVS generated, explained, and sent to the patient.  RTC in : 1 year for annual wellness visit  Labs: Not ordered yet            LEONARDO FIGUEROA MD, MPH  Internal Medicine  [facility]  [facility]

## 2024-11-22 NOTE — ASSESSMENT & PLAN NOTE
Stable.    Continue therapy as recommended by Rheumatology (Rx to chloroquine, methotrexate, folic acid.

## 2024-11-23 LAB — 25(OH)D3+25(OH)D2 SERPL-MCNC: 21 NG/ML (ref 30–96)

## 2024-11-29 ENCOUNTER — PATIENT MESSAGE (OUTPATIENT)
Dept: RHEUMATOLOGY | Facility: CLINIC | Age: 49
End: 2024-11-29
Payer: COMMERCIAL

## 2024-11-30 RX ORDER — ASPIRIN 325 MG
50000 TABLET, DELAYED RELEASE (ENTERIC COATED) ORAL
Qty: 12 CAPSULE | Refills: 3 | Status: SHIPPED | OUTPATIENT
Start: 2024-11-30

## 2024-12-10 DIAGNOSIS — M32.19 OTHER SYSTEMIC LUPUS ERYTHEMATOSUS WITH OTHER ORGAN INVOLVEMENT: Primary | ICD-10-CM

## 2024-12-24 ENCOUNTER — LAB VISIT (OUTPATIENT)
Dept: LAB | Facility: HOSPITAL | Age: 49
End: 2024-12-24
Attending: STUDENT IN AN ORGANIZED HEALTH CARE EDUCATION/TRAINING PROGRAM
Payer: COMMERCIAL

## 2024-12-24 DIAGNOSIS — M32.19 OTHER SYSTEMIC LUPUS ERYTHEMATOSUS WITH OTHER ORGAN INVOLVEMENT: ICD-10-CM

## 2024-12-24 PROCEDURE — 82595 ASSAY OF CRYOGLOBULIN: CPT | Performed by: STUDENT IN AN ORGANIZED HEALTH CARE EDUCATION/TRAINING PROGRAM

## 2024-12-24 PROCEDURE — 36415 COLL VENOUS BLD VENIPUNCTURE: CPT | Mod: PN | Performed by: STUDENT IN AN ORGANIZED HEALTH CARE EDUCATION/TRAINING PROGRAM

## 2025-01-18 ENCOUNTER — PATIENT MESSAGE (OUTPATIENT)
Dept: RHEUMATOLOGY | Facility: CLINIC | Age: 50
End: 2025-01-18
Payer: COMMERCIAL

## 2025-01-19 DIAGNOSIS — M32.9 SYSTEMIC LUPUS ERYTHEMATOSUS, UNSPECIFIED SLE TYPE, UNSPECIFIED ORGAN INVOLVEMENT STATUS: ICD-10-CM

## 2025-01-20 DIAGNOSIS — M32.9 SYSTEMIC LUPUS ERYTHEMATOSUS, UNSPECIFIED SLE TYPE, UNSPECIFIED ORGAN INVOLVEMENT STATUS: ICD-10-CM

## 2025-01-26 RX ORDER — METHOTREXATE 2.5 MG/1
TABLET ORAL
Qty: 40 TABLET | Refills: 6 | OUTPATIENT
Start: 2025-01-26

## 2025-01-27 RX ORDER — FOLIC ACID 1 MG/1
1 TABLET ORAL
Qty: 90 TABLET | Refills: 3 | Status: SHIPPED | OUTPATIENT
Start: 2025-01-27

## 2025-01-27 RX ORDER — METHOTREXATE 2.5 MG/1
TABLET ORAL
Qty: 40 TABLET | Refills: 6 | Status: SHIPPED | OUTPATIENT
Start: 2025-01-27

## 2025-01-29 ENCOUNTER — LAB VISIT (OUTPATIENT)
Dept: LAB | Facility: HOSPITAL | Age: 50
End: 2025-01-29
Attending: STUDENT IN AN ORGANIZED HEALTH CARE EDUCATION/TRAINING PROGRAM
Payer: COMMERCIAL

## 2025-01-29 DIAGNOSIS — Z79.899 HIGH RISK MEDICATION USE: Primary | ICD-10-CM

## 2025-01-29 DIAGNOSIS — Z79.899 HIGH RISK MEDICATION USE: ICD-10-CM

## 2025-01-29 LAB
BASOPHILS # BLD AUTO: 0.02 K/UL (ref 0–0.2)
BASOPHILS NFR BLD: 0.5 % (ref 0–1.9)
DIFFERENTIAL METHOD BLD: ABNORMAL
EOSINOPHIL # BLD AUTO: 0.1 K/UL (ref 0–0.5)
EOSINOPHIL NFR BLD: 2.5 % (ref 0–8)
ERYTHROCYTE [DISTWIDTH] IN BLOOD BY AUTOMATED COUNT: 13.2 % (ref 11.5–14.5)
HCT VFR BLD AUTO: 41.6 % (ref 40–54)
HGB BLD-MCNC: 13.2 G/DL (ref 14–18)
IMM GRANULOCYTES # BLD AUTO: 0 K/UL (ref 0–0.04)
IMM GRANULOCYTES NFR BLD AUTO: 0 % (ref 0–0.5)
LYMPHOCYTES # BLD AUTO: 2.6 K/UL (ref 1–4.8)
LYMPHOCYTES NFR BLD: 58.1 % (ref 18–48)
MCH RBC QN AUTO: 28.7 PG (ref 27–31)
MCHC RBC AUTO-ENTMCNC: 31.7 G/DL (ref 32–36)
MCV RBC AUTO: 90 FL (ref 82–98)
MONOCYTES # BLD AUTO: 0.6 K/UL (ref 0.3–1)
MONOCYTES NFR BLD: 13.9 % (ref 4–15)
NEUTROPHILS # BLD AUTO: 1.1 K/UL (ref 1.8–7.7)
NEUTROPHILS NFR BLD: 25 % (ref 38–73)
NRBC BLD-RTO: 0 /100 WBC
PLATELET # BLD AUTO: 188 K/UL (ref 150–450)
PMV BLD AUTO: 11.2 FL (ref 9.2–12.9)
RBC # BLD AUTO: 4.6 M/UL (ref 4.6–6.2)
WBC # BLD AUTO: 4.39 K/UL (ref 3.9–12.7)

## 2025-01-29 PROCEDURE — 85025 COMPLETE CBC W/AUTO DIFF WBC: CPT | Performed by: STUDENT IN AN ORGANIZED HEALTH CARE EDUCATION/TRAINING PROGRAM

## 2025-01-29 PROCEDURE — 36415 COLL VENOUS BLD VENIPUNCTURE: CPT | Performed by: STUDENT IN AN ORGANIZED HEALTH CARE EDUCATION/TRAINING PROGRAM

## 2025-02-06 DIAGNOSIS — M32.9 SYSTEMIC LUPUS ERYTHEMATOSUS, UNSPECIFIED SLE TYPE, UNSPECIFIED ORGAN INVOLVEMENT STATUS: ICD-10-CM

## 2025-02-06 RX ORDER — HYDROXYCHLOROQUINE SULFATE 200 MG/1
400 TABLET, FILM COATED ORAL DAILY
Qty: 60 TABLET | Refills: 6 | Status: SHIPPED | OUTPATIENT
Start: 2025-02-06

## 2025-02-06 RX ORDER — FOLIC ACID 1 MG/1
1 TABLET ORAL DAILY
Qty: 90 TABLET | Refills: 3 | Status: CANCELLED | OUTPATIENT
Start: 2025-02-06

## 2025-02-17 ENCOUNTER — RESULTS FOLLOW-UP (OUTPATIENT)
Dept: RHEUMATOLOGY | Facility: CLINIC | Age: 50
End: 2025-02-17

## 2025-02-28 ENCOUNTER — OFFICE VISIT (OUTPATIENT)
Dept: OPHTHALMOLOGY | Facility: CLINIC | Age: 50
End: 2025-02-28
Payer: COMMERCIAL

## 2025-02-28 DIAGNOSIS — Z79.899 EYE EXAM DUE TO HIGH RISK MEDICATION, ENCOUNTER FOR: Primary | ICD-10-CM

## 2025-02-28 DIAGNOSIS — H35.52 RETINITIS PIGMENTOSA OF BOTH EYES: ICD-10-CM

## 2025-02-28 DIAGNOSIS — M32.9 SYSTEMIC LUPUS ERYTHEMATOSUS, UNSPECIFIED SLE TYPE, UNSPECIFIED ORGAN INVOLVEMENT STATUS: ICD-10-CM

## 2025-02-28 PROCEDURE — 99999 PR PBB SHADOW E&M-EST. PATIENT-LVL II: CPT | Mod: PBBFAC,,, | Performed by: STUDENT IN AN ORGANIZED HEALTH CARE EDUCATION/TRAINING PROGRAM

## 2025-02-28 NOTE — PROGRESS NOTES
HPI     Annual Exam            Comments: Pt reports for plaquenil annual exam. Va stable. No pain or   irritation. No other ocular complaints.           Comments    1. + plaquenil - started 10/2021  Lupus  2. Hx of trauma OS  3. PVD OD  4. RP OU               Last edited by Aidee Alejandra on 2/28/2025  8:14 AM.            Assessment /Plan     For exam results, see Encounter Report.    Eye exam due to high risk medication, encounter for  Systemic lupus erythematosus, unspecified SLE type, unspecified organ involvement status  -    No maculopathy today on exam   MOCT WNL     Retinitis pigmentosa of both eyes- Will follow annually      RTC 6M 24-2 HVF, MOCT

## 2025-03-13 ENCOUNTER — PATIENT MESSAGE (OUTPATIENT)
Dept: RHEUMATOLOGY | Facility: CLINIC | Age: 50
End: 2025-03-13
Payer: COMMERCIAL

## 2025-03-19 DIAGNOSIS — M32.9 SYSTEMIC LUPUS ERYTHEMATOSUS, UNSPECIFIED SLE TYPE, UNSPECIFIED ORGAN INVOLVEMENT STATUS: Primary | ICD-10-CM

## 2025-03-31 ENCOUNTER — LAB VISIT (OUTPATIENT)
Dept: LAB | Facility: HOSPITAL | Age: 50
End: 2025-03-31
Attending: STUDENT IN AN ORGANIZED HEALTH CARE EDUCATION/TRAINING PROGRAM
Payer: COMMERCIAL

## 2025-03-31 DIAGNOSIS — Z79.899 HIGH RISK MEDICATION USE: ICD-10-CM

## 2025-03-31 LAB
ABSOLUTE EOSINOPHIL (OHS): 0.13 K/UL
ABSOLUTE MONOCYTE (OHS): 0.35 K/UL (ref 0.3–1)
ABSOLUTE NEUTROPHIL COUNT (OHS): 1.15 K/UL (ref 1.8–7.7)
ALBUMIN SERPL BCP-MCNC: 3.8 G/DL (ref 3.5–5.2)
ALP SERPL-CCNC: 76 UNIT/L (ref 40–150)
ALT SERPL W/O P-5'-P-CCNC: 40 UNIT/L (ref 10–44)
ANION GAP (OHS): 6 MMOL/L (ref 8–16)
AST SERPL-CCNC: 27 UNIT/L (ref 11–45)
BASOPHILS # BLD AUTO: 0.03 K/UL
BASOPHILS NFR BLD AUTO: 0.9 %
BILIRUB SERPL-MCNC: 0.7 MG/DL (ref 0.1–1)
BUN SERPL-MCNC: 13 MG/DL (ref 6–20)
CALCIUM SERPL-MCNC: 8.8 MG/DL (ref 8.7–10.5)
CHLORIDE SERPL-SCNC: 107 MMOL/L (ref 95–110)
CO2 SERPL-SCNC: 25 MMOL/L (ref 23–29)
CREAT SERPL-MCNC: 1.1 MG/DL (ref 0.5–1.4)
CRP SERPL-MCNC: 3.1 MG/L
ERYTHROCYTE [DISTWIDTH] IN BLOOD BY AUTOMATED COUNT: 13.6 % (ref 11.5–14.5)
ERYTHROCYTE [SEDIMENTATION RATE] IN BLOOD: 18 MM/HR
GFR SERPLBLD CREATININE-BSD FMLA CKD-EPI: >60 ML/MIN/1.73/M2
GLUCOSE SERPL-MCNC: 85 MG/DL (ref 70–110)
HCT VFR BLD AUTO: 41.9 % (ref 40–54)
HGB BLD-MCNC: 13.2 GM/DL (ref 14–18)
IMM GRANULOCYTES # BLD AUTO: 0.02 K/UL (ref 0–0.04)
IMM GRANULOCYTES NFR BLD AUTO: 0.6 % (ref 0–0.5)
LYMPHOCYTES # BLD AUTO: 1.67 K/UL (ref 1–4.8)
MCH RBC QN AUTO: 28.6 PG (ref 27–31)
MCHC RBC AUTO-ENTMCNC: 31.5 G/DL (ref 32–36)
MCV RBC AUTO: 91 FL (ref 82–98)
NUCLEATED RBC (/100WBC) (OHS): 0 /100 WBC
PLATELET # BLD AUTO: 174 K/UL (ref 150–450)
PMV BLD AUTO: 11.9 FL (ref 9.2–12.9)
POTASSIUM SERPL-SCNC: 4.3 MMOL/L (ref 3.5–5.1)
PROT SERPL-MCNC: 7.8 GM/DL (ref 6–8.4)
RBC # BLD AUTO: 4.61 M/UL (ref 4.6–6.2)
RELATIVE EOSINOPHIL (OHS): 3.9 %
RELATIVE LYMPHOCYTE (OHS): 49.9 % (ref 18–48)
RELATIVE MONOCYTE (OHS): 10.4 % (ref 4–15)
RELATIVE NEUTROPHIL (OHS): 34.3 % (ref 38–73)
SODIUM SERPL-SCNC: 138 MMOL/L (ref 136–145)
WBC # BLD AUTO: 3.35 K/UL (ref 3.9–12.7)

## 2025-03-31 PROCEDURE — 36415 COLL VENOUS BLD VENIPUNCTURE: CPT | Mod: PN

## 2025-03-31 PROCEDURE — 80053 COMPREHEN METABOLIC PANEL: CPT

## 2025-03-31 PROCEDURE — 85652 RBC SED RATE AUTOMATED: CPT

## 2025-03-31 PROCEDURE — 85025 COMPLETE CBC W/AUTO DIFF WBC: CPT

## 2025-03-31 PROCEDURE — 86140 C-REACTIVE PROTEIN: CPT

## 2025-04-07 ENCOUNTER — OFFICE VISIT (OUTPATIENT)
Dept: RHEUMATOLOGY | Facility: CLINIC | Age: 50
End: 2025-04-07
Payer: COMMERCIAL

## 2025-04-07 DIAGNOSIS — Z79.60 ENCOUNTER FOR MONITORING IMMUNOSUPPRESSIVE MEDICATION THERAPY CAUSING IMMUNODEFICIENCY: ICD-10-CM

## 2025-04-07 DIAGNOSIS — Z79.899 IMMUNODEFICIENCY DUE TO DRUG THERAPY: ICD-10-CM

## 2025-04-07 DIAGNOSIS — Z79.899 LONG-TERM USE OF PLAQUENIL: ICD-10-CM

## 2025-04-07 DIAGNOSIS — Z22.7 TB LUNG, LATENT: ICD-10-CM

## 2025-04-07 DIAGNOSIS — E55.9 VITAMIN D DEFICIENCY: ICD-10-CM

## 2025-04-07 DIAGNOSIS — M32.19 OTHER SYSTEMIC LUPUS ERYTHEMATOSUS WITH OTHER ORGAN INVOLVEMENT: Primary | ICD-10-CM

## 2025-04-07 DIAGNOSIS — Z51.81 ENCOUNTER FOR MONITORING IMMUNOSUPPRESSIVE MEDICATION THERAPY CAUSING IMMUNODEFICIENCY: ICD-10-CM

## 2025-04-07 DIAGNOSIS — D84.821 IMMUNODEFICIENCY DUE TO DRUG THERAPY: ICD-10-CM

## 2025-04-07 DIAGNOSIS — D84.821 ENCOUNTER FOR MONITORING IMMUNOSUPPRESSIVE MEDICATION THERAPY CAUSING IMMUNODEFICIENCY: ICD-10-CM

## 2025-04-07 DIAGNOSIS — Z79.899 HIGH RISK MEDICATION USE: ICD-10-CM

## 2025-04-07 PROCEDURE — 1159F MED LIST DOCD IN RCRD: CPT | Mod: CPTII,95,, | Performed by: STUDENT IN AN ORGANIZED HEALTH CARE EDUCATION/TRAINING PROGRAM

## 2025-04-07 PROCEDURE — 1160F RVW MEDS BY RX/DR IN RCRD: CPT | Mod: CPTII,95,, | Performed by: STUDENT IN AN ORGANIZED HEALTH CARE EDUCATION/TRAINING PROGRAM

## 2025-04-07 PROCEDURE — 98006 SYNCH AUDIO-VIDEO EST MOD 30: CPT | Mod: 95,,, | Performed by: STUDENT IN AN ORGANIZED HEALTH CARE EDUCATION/TRAINING PROGRAM

## 2025-04-07 NOTE — PATIENT INSTRUCTIONS
Make an appointment for 3 months with a Rheumatologist at one of the following locations:  [facility].  [facility].  [facility].

## 2025-04-07 NOTE — PROGRESS NOTES
The patient location is: [place]  The chief complaint leading to consultation is: SLE    Visit type: audiovisual    Face to Face time with patient: 10 minutes  30 minutes of total time spent on the encounter, which includes face to face time and non-face to face time preparing to see the patient (eg, review of tests), Obtaining and/or reviewing separately obtained history, Documenting clinical information in the electronic or other health record, Independently interpreting results (not separately reported) and communicating results to the patient/family/caregiver, or Care coordination (not separately reported).         Each patient to whom he or she provides medical services by telemedicine is:  (1) informed of the relationship between the physician and patient and the respective role of any other health care provider with respect to management of the patient; and (2) notified that he or she may decline to receive medical services by telemedicine and may withdraw from such care at any time.    Notes:       Subjective:      Patient ID: Jone Mathur is a 49 y.o. male.    Chief Complaint: SLE    HPI:   Patient presents for Rheumatology follow up for SLE. SARA+ 1:1280, dsDNA+, Sm+, RNP+, SSA+, hx of cryoglobulins+, chronic intermittent neutropenia. 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 Currently takes Plaquenil 400 mg daily and MTX 25 mg/wk in split dosing with folic acid 1 mg daily. Inflammatory arthritis resolved with addition of MTX in 2022. Reports he is doing well in general. Currently he has a residual cough from a cold a couple of months ago. We held MTX for a couple months after last visit to see if this would improve the chronic neutropenia. He reports he did not feel different off MTX. Denies joint swelling, significant stiffness, skin rashes, oral ulcers, patchy alopecia, sicca symptoms, cardiopulmonary symptoms, eye inflammation, IBD, fevers, weight  loss. Rheumatology review of systems is otherwise negative.    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 He takes precautions to keep his skin covered and use sunscreen.   Social Hx: non smoker, no alcohol use. Works in construction.      Objective:   There were no vitals taken for this visit.  Physical Exam  Constitutional:       General: He is not in acute distress.     Appearance: Normal appearance.             Assessment and Plan:     Problem List Items Addressed This Visit          Unprioritized    Other forms of systemic lupus erythematosus - Primary (Chronic)    Relevant Orders    Ambulatory referral/consult to Rheumatology     Other Visit Diagnoses         Immunodeficiency due to drug therapy          High risk medication use          Encounter for monitoring immunosuppressive medication therapy causing immunodeficiency          Vitamin D deficiency          TB lung, latent          Long-term use of Plaquenil                Patient is a 49 yo M who presents for Rheumatology follow up for SLE, diagnosed in 2021.  SARA+ 1:1280, dsDNA+, Sm+, RNP+, SSA+, hx of cryoglobulins+, chronic intermittent neutropenia.  Manifestations of inflammatory oligoarthritis and Raynaud's.  Vitamin D deficiency.    Negative RF and CCP  Normal complements.  No proteinuria.    Hb baseline 12-13. Saw Hematology in the past and they were not worried about it, he reports.  Intermittent mild leukopenia.  No thrombocytopenia.  Cr/GFR normal    RF and CCP negative    Plaquenil eye exam 2/2025: no maculopathy.    Hx latent TB - treated.      Plan:  Hold MTX for current cold/cough. Then resume 25 mg/wk in split dosing.  Continue folic acid 1 mg daily.  Continue hydroxychloroquine 400 mg daily.  Continue vitamin D 50,000 IU weekly.  Counseled on UV exposure precautions for SLE  Counseled on core warming precautions for Raynaud's  Long term use of Plaquenil. Continue annual Plaquenil eye exams.  Informed patient that I will be leaving  [facility] in June and he will have to establish with an external Rheumatologist for follow up in the next 3-4 months.      High Risk Medication Monitoring encounter  Drug therapy requiring intensive monitoring for toxicity  No current medication related issues, no evidence of toxicity  Appropriate labs ordered for toxicity monitoring    Compromised immune system secondary to autoimmune disease and/or use of immunosuppressive drugs.  Monitor carefully for infections.  Advised patient to get immediate medical care if any infection arises.  Also advised strict adherence age-appropriate vaccinations and cancer screenings with PCP.    Patient advised to hold DMARD and/or biologic therapy for signs of infection or for surgery. If you are unsure what to do please call our office for instruction.[facility] [phone],        I spent a total of 30 minutes on the day of the visit.  This includes face to face time and non-face to face time preparing to see the patient (eg, review of tests), obtaining and/or reviewing separately obtained history, documenting clinical information in the electronic or other health record, independently interpreting results and communicating results to the patient/family/caregiver, or care coordinator.

## 2025-04-13 DIAGNOSIS — M32.9 SYSTEMIC LUPUS ERYTHEMATOSUS, UNSPECIFIED SLE TYPE, UNSPECIFIED ORGAN INVOLVEMENT STATUS: ICD-10-CM

## 2025-04-14 ENCOUNTER — TELEPHONE (OUTPATIENT)
Dept: RHEUMATOLOGY | Facility: CLINIC | Age: 50
End: 2025-04-14
Payer: COMMERCIAL

## 2025-04-14 NOTE — TELEPHONE ENCOUNTER
Notified patients wife that at this time provider isn't accepting any new patients. She stated that she understood   none

## 2025-04-14 NOTE — TELEPHONE ENCOUNTER
----- Message from Med Assistant Rogers sent at 4/8/2025  4:35 PM CDT -----  Contact: [phone]    ----- Message -----  From: Surya Escobar  Sent: 4/8/2025   4:32 PM CDT  To: Brennan Gay Staff    Pina is calling on behalf of patient. Patient needs first available appointment due to lupus. Pt was seen by Dr Sutherland and is needing to be seen every three months. Wife was informed that Dr magaña and Dr PALOMO is not available and she requested a message to be sent over to department. Please call patient at [phone]. Thanks KB

## 2025-04-16 RX ORDER — HYDROXYCHLOROQUINE SULFATE 200 MG/1
TABLET, FILM COATED ORAL
Qty: 180 TABLET | Refills: 1 | Status: SHIPPED | OUTPATIENT
Start: 2025-04-16